# Patient Record
Sex: FEMALE | Race: WHITE | NOT HISPANIC OR LATINO | Employment: OTHER | URBAN - METROPOLITAN AREA
[De-identification: names, ages, dates, MRNs, and addresses within clinical notes are randomized per-mention and may not be internally consistent; named-entity substitution may affect disease eponyms.]

---

## 2018-04-07 LAB — CARCINOEMBRYONIC ANTIGEN (HISTORICAL): 2.7 NG/ML (ref 0–4.7)

## 2018-04-10 LAB
%SAT (HISTORICAL): 22 % (ref 20–55)
25(OH)D3 SERPL-MCNC: 31.51 NG/ML
ALBUMIN SERPL BCP-MCNC: 4.2 G/DL (ref 3.5–5.7)
ALP SERPL-CCNC: 74 IU/L (ref 55–165)
ALT SERPL W P-5'-P-CCNC: 14 IU/L (ref 5–26)
ANION GAP SERPL CALCULATED.3IONS-SCNC: 11.3 MM/L
AST SERPL W P-5'-P-CCNC: 23 U/L (ref 7–26)
BASOPHILS # BLD AUTO: 0.1 X3/UL (ref 0–0.3)
BASOPHILS # BLD AUTO: 1.1 % (ref 0–2)
BILIRUB SERPL-MCNC: 0.7 MG/DL (ref 0.3–1)
BILIRUBIN DIRECT (HISTORICAL): 0.1 MG/DL (ref 0–0.2)
BUN SERPL-MCNC: 12 MG/DL (ref 7–25)
CALCIUM SERPL-MCNC: 9.9 MG/DL (ref 8.6–10.5)
CHLORIDE SERPL-SCNC: 94 MM/L (ref 98–107)
CHOLEST SERPL-MCNC: 195 MG/DL (ref 0–200)
CO2 SERPL-SCNC: 35 MM/L (ref 21–31)
CREAT SERPL-MCNC: 0.71 MG/DL (ref 0.6–1.2)
DEPRECATED RDW RBC AUTO: 14.5 %
EGFR (HISTORICAL): > 60 GFR
EGFR AFRICAN AMERICAN (HISTORICAL): > 60 GFR
EOSINOPHIL # BLD AUTO: 0.2 X3/UL (ref 0–0.5)
EOSINOPHIL NFR BLD AUTO: 2.5 % (ref 0–5)
GLUCOSE (HISTORICAL): 94 MG/DL (ref 65–99)
HCT VFR BLD AUTO: 38.9 % (ref 37–47)
HDLC SERPL-MCNC: 72 MG/DL (ref 40–60)
HGB BLD-MCNC: 13.5 G/DL (ref 12–16)
IRON SERPL-MCNC: 64 UG/DL (ref 50–212)
LDLC SERPL CALC-MCNC: 98.6 MG/DL (ref 75–193)
LYMPHOCYTES # BLD AUTO: 1.9 X3/UL (ref 1.2–4.2)
LYMPHOCYTES NFR BLD AUTO: 26 % (ref 20.5–51.1)
MCH RBC QN AUTO: 30.4 PG (ref 26–34)
MCHC RBC AUTO-ENTMCNC: 34.8 G/DL (ref 31–37)
MCV RBC AUTO: 87.4 FL (ref 81–99)
MONOCYTES # BLD AUTO: 0.6 X3/UL (ref 0–1)
MONOCYTES NFR BLD AUTO: 8.8 % (ref 1.7–12)
NEUTROPHILS # BLD AUTO: 4.5 X3/UL (ref 1.4–6.5)
NEUTS SEG NFR BLD AUTO: 61.6 % (ref 42.2–75.2)
OSMOLALITY, SERUM (HISTORICAL): 273 MOSM (ref 262–291)
PLATELET # BLD AUTO: 260 X3/UL (ref 130–400)
PMV BLD AUTO: 7.7 FL
POTASSIUM SERPL-SCNC: 3.3 MM/L (ref 3.5–5.5)
RBC # BLD AUTO: 4.44 X6/UL (ref 3.9–5.2)
SODIUM SERPL-SCNC: 137 MM/L (ref 134–143)
TIBC SERPL-MCNC: 297 UG/DL (ref 250–425)
TOTAL PROTEIN (HISTORICAL): 6.9 G/DL (ref 6.4–8.9)
TRANSFERRIN (HISTORICAL): 212 MG/DL (ref 250–380)
TRIGL SERPL-MCNC: 124 MG/DL (ref 44–166)
VLDL CHOLESTEROL (HISTORICAL): 25 MG/DL (ref 5–51)
WBC # BLD AUTO: 7.2 X3/UL (ref 4.8–10.8)

## 2018-05-18 LAB — POTASSIUM SERPL-SCNC: 3 MM/L (ref 3.5–5.5)

## 2018-07-13 ENCOUNTER — TRANSCRIBE ORDERS (OUTPATIENT)
Dept: ADMINISTRATIVE | Facility: HOSPITAL | Age: 83
End: 2018-07-13

## 2018-07-13 ENCOUNTER — APPOINTMENT (OUTPATIENT)
Dept: LAB | Facility: HOSPITAL | Age: 83
End: 2018-07-13
Payer: MEDICARE

## 2018-07-13 DIAGNOSIS — E55.9 MILD VITAMIN D DEFICIENCY: ICD-10-CM

## 2018-07-13 DIAGNOSIS — E78.2 MIXED HYPERLIPIDEMIA: ICD-10-CM

## 2018-07-13 DIAGNOSIS — R53.83 FATIGUE, UNSPECIFIED TYPE: ICD-10-CM

## 2018-07-13 DIAGNOSIS — R53.83 FATIGUE, UNSPECIFIED TYPE: Primary | ICD-10-CM

## 2018-07-13 LAB
25(OH)D3 SERPL-MCNC: 26.5 NG/ML (ref 30–100)
ALBUMIN SERPL BCP-MCNC: 3.9 G/DL (ref 3.5–5.7)
ALP SERPL-CCNC: 86 U/L (ref 55–165)
ALT SERPL W P-5'-P-CCNC: 11 U/L (ref 7–52)
ANION GAP SERPL CALCULATED.3IONS-SCNC: 8 MMOL/L (ref 4–13)
AST SERPL W P-5'-P-CCNC: 20 U/L (ref 13–39)
BILIRUB DIRECT SERPL-MCNC: 0.1 MG/DL (ref 0–0.2)
BILIRUB SERPL-MCNC: 0.5 MG/DL (ref 0.2–1)
BUN SERPL-MCNC: 15 MG/DL (ref 7–25)
CALCIUM SERPL-MCNC: 9.5 MG/DL (ref 8.6–10.5)
CHLORIDE SERPL-SCNC: 96 MMOL/L (ref 98–107)
CHOLEST SERPL-MCNC: 172 MG/DL (ref 0–200)
CO2 SERPL-SCNC: 35 MMOL/L (ref 21–31)
CREAT SERPL-MCNC: 0.69 MG/DL (ref 0.6–1.2)
ERYTHROCYTE [DISTWIDTH] IN BLOOD BY AUTOMATED COUNT: 13.5 % (ref 11.6–15.1)
FERRITIN SERPL-MCNC: 81 NG/ML (ref 8–388)
GFR SERPL CREATININE-BSD FRML MDRD: 73 ML/MIN/1.73SQ M
GLUCOSE P FAST SERPL-MCNC: 98 MG/DL (ref 65–99)
HCT VFR BLD AUTO: 38 % (ref 37–47)
HDLC SERPL-MCNC: 71 MG/DL (ref 40–60)
HGB BLD-MCNC: 13.1 G/DL (ref 11.5–15.4)
IRON SATN MFR SERPL: 23 %
IRON SERPL-MCNC: 71 UG/DL (ref 50–170)
LDLC SERPL CALC-MCNC: 82 MG/DL (ref 0–100)
MCH RBC QN AUTO: 31.6 PG (ref 26.8–34.3)
MCHC RBC AUTO-ENTMCNC: 34.5 G/DL (ref 31.4–37.4)
MCV RBC AUTO: 92 FL (ref 82–98)
NONHDLC SERPL-MCNC: 101 MG/DL
PLATELET # BLD AUTO: 295 THOUSANDS/UL (ref 149–390)
PMV BLD AUTO: 7.9 FL (ref 8.9–12.7)
POTASSIUM SERPL-SCNC: 3.5 MMOL/L (ref 3.5–5.5)
PROT SERPL-MCNC: 6.6 G/DL (ref 6.4–8.9)
RBC # BLD AUTO: 4.16 MILLION/UL (ref 3.81–5.12)
SODIUM SERPL-SCNC: 139 MMOL/L (ref 134–143)
TIBC SERPL-MCNC: 307 UG/DL (ref 250–450)
TRIGL SERPL-MCNC: 96 MG/DL (ref 44–166)
WBC # BLD AUTO: 7.1 THOUSAND/UL (ref 4.31–10.16)

## 2018-07-13 PROCEDURE — 36415 COLL VENOUS BLD VENIPUNCTURE: CPT

## 2018-07-13 PROCEDURE — 82728 ASSAY OF FERRITIN: CPT

## 2018-07-13 PROCEDURE — 83550 IRON BINDING TEST: CPT

## 2018-07-13 PROCEDURE — 82306 VITAMIN D 25 HYDROXY: CPT

## 2018-07-13 PROCEDURE — 80061 LIPID PANEL: CPT

## 2018-07-13 PROCEDURE — 83540 ASSAY OF IRON: CPT

## 2018-07-13 PROCEDURE — 80076 HEPATIC FUNCTION PANEL: CPT

## 2018-07-13 PROCEDURE — 85027 COMPLETE CBC AUTOMATED: CPT

## 2018-07-13 PROCEDURE — 80048 BASIC METABOLIC PNL TOTAL CA: CPT

## 2018-08-13 ENCOUNTER — EVALUATION (OUTPATIENT)
Dept: PHYSICAL THERAPY | Facility: CLINIC | Age: 83
End: 2018-08-13
Payer: MEDICARE

## 2018-08-13 DIAGNOSIS — M17.12 OSTEOARTHRITIS OF LEFT KNEE, UNSPECIFIED OSTEOARTHRITIS TYPE: Primary | ICD-10-CM

## 2018-08-13 DIAGNOSIS — R29.898 MUSCULAR DECONDITIONING: ICD-10-CM

## 2018-08-13 PROCEDURE — G8979 MOBILITY GOAL STATUS: HCPCS | Performed by: PHYSICAL THERAPIST

## 2018-08-13 PROCEDURE — 97163 PT EVAL HIGH COMPLEX 45 MIN: CPT | Performed by: PHYSICAL THERAPIST

## 2018-08-13 PROCEDURE — G8978 MOBILITY CURRENT STATUS: HCPCS | Performed by: PHYSICAL THERAPIST

## 2018-08-13 PROCEDURE — 97110 THERAPEUTIC EXERCISES: CPT | Performed by: PHYSICAL THERAPIST

## 2018-08-13 NOTE — PROGRESS NOTES
PT Evaluation     Today's date: 2018  Patient name: Jacques Upton  : 3/29/1921  MRN: [id number]  Referring provider: Daniel Boyd DO  Dx:   Encounter Diagnosis     ICD-10-CM    1  Osteoarthritis of left knee, unspecified osteoarthritis type M17 12    2  Muscular deconditioning R29 898                   Assessment  Impairments: abnormal gait, activity intolerance, impaired balance, impaired physical strength, lacks appropriate home exercise program and pain with function  Functional limitations: difficulty ambulating long distances, ambulating up/down the steps, lifting/carrying a bag of groceries  Assessment details: Jacques Upton is a 80 y o  female with a history of anxiety, arthritis, back pain, HTN that presents with signs and symptoms consistent with left knee OA  During the examination the patient demonstrated decreased strength, decreased ROM, gait dysfunction, and pain  The patient's impairments are causing the following functional limitations: difficulty ambulating long distances, ambulating up/down the steps, lifting/carrying a bag of groceries  Patient's presentation is unstable due to chronicity of diagnosis, patient age, and history of anxiety  The patient will benefit from skilled PT services to address impairments, work towards goals, and restore PLOF  Barriers to therapy: [place], advanced age  Understanding of Dx/Px/POC: good   Prognosis: fair    Goals  STG: Achieve in 2-4 weeks  1  Patient will have reported 4/10 pain in left thigh when attempting to relax/rest    2  Improve patient's L SLR to 50 degrees for increased ability to take proper strides during ambulation  3  Increase patient's MMT left quadriceps, hamstrings, hip abduction to at least 4/5 to improve walking tolerance  LTG:  Achieve in 4-8 weeks  1  Patient's LE strength will be equal bilaterally for ability to ambulate and stair climb without difficulty     2  Patient will be able to walk 2 blocks with less than 2/10 pain in left leg for increased ability to grocery shop, walk for leisure  3  Patient will be independent with home exercise program for continued maintenance post PT discharge  Plan  Patient would benefit from: skilled physical therapy  Planned modality interventions: thermotherapy: hydrocollator packs and cryotherapy  Planned therapy interventions: manual therapy, massage, neuromuscular re-education, therapeutic activities, therapeutic exercise, home exercise program, gait training, stretching, strengthening and patient education  Frequency: 2x week  Duration in visits: 12  Duration in weeks: 6  Plan of Care beginning date: 2018  Plan of Care expiration date: 2018        Subjective Evaluation    History of Present Illness  Date of onset: 2015  Mechanism of injury: Van Alva is a 80 y o  female that presents to outpatient physical therapy with complaints of left lateral thigh, knee, and lateral calf pain  The patient reports having the shingles  as the mechanism of injury  This pain is chronic and intermittent over the past 3 years  The patient was referred to outpatient PT by Dr Nader Weber with a diagnosis of left knee OA and deconditioning  The patient's main goal for physical therapy is to be able to relax in the evening without left leg pain  The patient wants to be able to walk and be active       PmHx: Hx L leg DVT , HTN, hx colorectal surgery , L leg shingles, anxiety, [place]    Recurrent probem    Quality of life: good    Pain  Current pain ratin  At best pain ratin  At worst pain ratin  Location: left anterior/lateral thigh, left knee joint, lef lateral calf  Quality: sharp  Relieving factors: medications  Aggravating factors: standing and walking  Progression: no change    Social Support  Steps to enter house: yes  5  Stairs in house: no   [facility] in: [facility]  Lives with: adult children    Employment status: not working  Hand dominance: right    Treatments  No previous or current treatments  Patient Goals  Patient goals for therapy: independence with ADLs/IADLs, improved balance, decreased pain and increased strength          Objective     Palpation   Left   Tenderness of the rectus femoris and vastus lateralis  Tenderness   Left Knee   No tenderness in the lateral joint line and lateral patella  Neurological Testing     Sensation     Knee   Left Knee   Diminished: light touch     Right Knee   Diminished: light touch     Active Range of Motion     Right Knee   Flexion: 101 degrees   Extension: 0 degrees     Additional Active Range of Motion Details  SLR R: 50 degrees  L:  30 degrees (pain)  Hip Abduct R: 31 degrees  L: 30 degrees    Hip Adduct R:  30 degrees  L: 20 degrees    Strength/Myotome Testing     Left Hip   Planes of Motion   Flexion: 3+  Abduction: 3+  Adduction: 4    Right Hip   Planes of Motion   Flexion: 4  Abduction: 4  Adduction: 4    Left Knee   Flexion: 4-  Extension: 3+    Right Knee   Flexion: 4+  Extension: 4+    General Comments     Knee Comments  Patient displays decreased step length, slow gait speed, ambulates with a rolling walker at all times due to poor balance            Flowsheet Rows      Most Recent Value   PT/OT G-Codes   Current Score  45   Projected Score  50   FOTO information reviewed  Yes   Assessment Type  Evaluation   G code set  Mobility: Walking & Moving Around   Mobility: Walking and Moving Around Current Status ()  CK   Mobility: Walking and Moving Around Goal Status ()  CK          Precautions: [facility]      Specialty Daily Treatment Diary     Manual  8/13/18       L Supine hamstring(bent knee) 3x:30       L IT band stretch(gentle) 3x:30       L piriformis stretch    ------- *      STM L anterior/lateral thigh ----------                   Exercise Diary  8/13/19       Nu step S=5 L2 x5 mins               Heel slides flex  adduct x10 :05 ea       Sit Hip up/out  Up/in  *      Sit Ham stretch 4x :25       LAQ x10 :05       Clam shells L  *      Stand Alt step taps  *      Chair squats  *      Step ups fwd/lateral        Stand balance feet together EO                       EC  *                                                                                  Modalities 8/13/18       MHP L thigh ---------                         The patient was given a new home exercise plan with written handout, pictures, and verbal instruction  The patient accepts and understands the new home activities

## 2018-08-13 NOTE — LETTER
2018    Eusebia Young DO  [address]  [address]  [facility] [address]    Patient: Gina Olsen   YOB: 1921   Date of Visit: 2018     Encounter Diagnosis     ICD-10-CM    1  Osteoarthritis of left knee, unspecified osteoarthritis type M17 12    2  Muscular deconditioning R29 898        Dear Dr Freedom Singletary:    Please review the attached Plan of Care from [facility] recent visit  Please verify that you agree therapy should continue by signing the attached document and sending it back to our office  If you have any questions or concerns, please don't hesitate to call  Sincerely,    Sharlene Sampson, PT      Referring Provider:      I certify that I have read the below Plan of Care and certify the need for these services furnished under this plan of treatment while under my care  Eusebia Young DO  [address]  [address]: [phone]          PT Evaluation     Today's date: 2018  Patient name: Gina Olsen  : 3/29/1921  MRN: [id number]  Referring provider: Reina Lora DO  Dx:   Encounter Diagnosis     ICD-10-CM    1  Osteoarthritis of left knee, unspecified osteoarthritis type M17 12    2  Muscular deconditioning R29 898                   Assessment  Impairments: abnormal gait, activity intolerance, impaired balance, impaired physical strength, lacks appropriate home exercise program and pain with function  Functional limitations: difficulty ambulating long distances, ambulating up/down the steps, lifting/carrying a bag of groceries  Assessment details: Gina Olsen is a 80 y o  female with a history of anxiety, arthritis, back pain, HTN that presents with signs and symptoms consistent with left knee OA  During the examination the patient demonstrated decreased strength, decreased ROM, gait dysfunction, and pain    The patient's impairments are causing the following functional limitations: difficulty ambulating long distances, ambulating up/down the steps, lifting/carrying a bag of groceries  Patient's presentation is unstable due to chronicity of diagnosis, patient age, and history of anxiety  The patient will benefit from skilled PT services to address impairments, work towards goals, and restore PLOF  Barriers to therapy: [place], advanced age  Understanding of Dx/Px/POC: good   Prognosis: fair    Goals  STG: Achieve in 2-4 weeks  1  Patient will have reported 4/10 pain in left thigh when attempting to relax/rest    2  Improve patient's L SLR to 50 degrees for increased ability to take proper strides during ambulation  3  Increase patient's MMT left quadriceps, hamstrings, hip abduction to at least 4/5 to improve walking tolerance  LTG:  Achieve in 4-8 weeks  1  Patient's LE strength will be equal bilaterally for ability to ambulate and stair climb without difficulty  2  Patient will be able to walk 2 blocks with less than 2/10 pain in left leg for increased ability to grocery shop, walk for leisure  3  Patient will be independent with home exercise program for continued maintenance post PT discharge  Plan  Patient would benefit from: skilled physical therapy  Planned modality interventions: thermotherapy: hydrocollator packs and cryotherapy  Planned therapy interventions: manual therapy, massage, neuromuscular re-education, therapeutic activities, therapeutic exercise, home exercise program, gait training, stretching, strengthening and patient education  Frequency: 2x week  Duration in visits: 12  Duration in weeks: 6  Plan of Care beginning date: 8/13/2018  Plan of Care expiration date: 9/11/2018        Subjective Evaluation    History of Present Illness  Date of onset: 8/13/2015  Mechanism of injury: Ramez Dee is a 80 y o  female that presents to outpatient physical therapy with complaints of left lateral thigh, knee, and lateral calf pain    The patient reports having the shingles  as the mechanism of injury  This pain is chronic and intermittent over the past 3 years  The patient was referred to outpatient PT by Dr Tho Jurado with a diagnosis of left knee OA and deconditioning  The patient's main goal for physical therapy is to be able to relax in the evening without left leg pain  The patient wants to be able to walk and be active  PmHx: Hx L leg DVT 2013, HTN, hx colorectal surgery 2013, L leg shingles, anxiety, [place]    Recurrent probem    Quality of life: good    Pain  Current pain ratin  At best pain ratin  At worst pain ratin  Location: left anterior/lateral thigh, left knee joint, lef lateral calf  Quality: sharp  Relieving factors: medications  Aggravating factors: standing and walking  Progression: no change    Social Support  Steps to enter house: yes  5  Stairs in house: no   Lives in: [employer]  Lives with: adult children    Employment status: not working  Hand dominance: right    Treatments  No previous or current treatments  Patient Goals  Patient goals for therapy: independence with ADLs/IADLs, improved balance, decreased pain and increased strength          Objective     Palpation   Left   Tenderness of the rectus femoris and vastus lateralis  Tenderness   Left Knee   No tenderness in the lateral joint line and lateral patella       Neurological Testing     Sensation     Knee   Left Knee   Diminished: light touch     Right Knee   Diminished: light touch     Active Range of Motion     Right Knee   Flexion: 101 degrees   Extension: 0 degrees     Additional Active Range of Motion Details  SLR R: 50 degrees  L:  30 degrees (pain)  Hip Abduct R: 31 degrees  L: 30 degrees    Hip Adduct R:  30 degrees  L: 20 degrees    Strength/Myotome Testing     Left Hip   Planes of Motion   Flexion: 3+  Abduction: 3+  Adduction: 4    Right Hip   Planes of Motion   Flexion: 4  Abduction: 4  Adduction: 4    Left Knee   Flexion: 4-  Extension: 3+    Right Knee Flexion: 4+  Extension: 4+    General Comments     Knee Comments  Patient displays decreased step length, slow gait speed, ambulates with a rolling walker at all times due to poor balance  Flowsheet Rows      Most Recent Value   PT/OT G-Codes   Current Score  45   Projected Score  50   FOTO information reviewed  Yes   Assessment Type  Evaluation   G code set  Mobility: Walking & Moving Around   Mobility: Walking and Moving Around Current Status ()  CK   Mobility: Walking and Moving Around Goal Status ()  CK          Precautions: [facility]      Specialty Daily Treatment Diary     Manual  8/13/18       L Supine hamstring(bent knee) 3x:30       L IT band stretch(gentle) 3x:30       L piriformis stretch    ------- *      STM L anterior/lateral thigh ----------                   Exercise Diary  8/13/19       Nu step S=5 L2 x5 mins               Heel slides flex  adduct x10 :05 ea       Sit Hip up/out  Up/in  *      Sit Ham stretch 4x :25       LAQ x10 :05       Clam shells L  *      Stand Alt step taps  *      Chair squats  *      Step ups fwd/lateral        Stand balance feet together EO                       EC  *                                                                                  Modalities 8/13/18       MHP L thigh ---------                         The patient was given a new home exercise plan with written handout, pictures, and verbal instruction  The patient accepts and understands the new home activities

## 2018-08-15 ENCOUNTER — TRANSCRIBE ORDERS (OUTPATIENT)
Dept: PHYSICAL THERAPY | Facility: CLINIC | Age: 83
End: 2018-08-15

## 2018-08-16 ENCOUNTER — OFFICE VISIT (OUTPATIENT)
Dept: PHYSICAL THERAPY | Facility: CLINIC | Age: 83
End: 2018-08-16
Payer: MEDICARE

## 2018-08-16 DIAGNOSIS — R29.898 MUSCULAR DECONDITIONING: ICD-10-CM

## 2018-08-16 DIAGNOSIS — M17.12 OSTEOARTHRITIS OF LEFT KNEE, UNSPECIFIED OSTEOARTHRITIS TYPE: Primary | ICD-10-CM

## 2018-08-16 PROCEDURE — 97110 THERAPEUTIC EXERCISES: CPT

## 2018-08-16 NOTE — PROGRESS NOTES
Daily Note     Today's date: 2018  Patient name: Rajesh Ogden  : 3/29/1921  MRN: [id number]  Referring provider: Nishi Mendoza DO  Dx:   Encounter Diagnosis     ICD-10-CM    1  Osteoarthritis of left knee, unspecified osteoarthritis type M17 12    2  Muscular deconditioning R29 898                   Subjective: Patient reports no pain in her left leg today  She states her leg feels looser since last visit  She had no increased pain with new exercises  Objective: See treatment diary below  Precautions: [facility]   Reeval:9/10/18      Specialty Daily Treatment Diary     Manual  18      L Supine hamstring(bent knee) 3x:30 3x :30      L IT band stretch(gentle) 3x:30 3x :30      L piriformis stretch    ------- 3x :30      STM L anterior/lateral thigh ----------                   Exercise Diary  19      Nu step S=5 L2 x5 mins L2 x 6min              Heel slides flex  adduct x10 :05 ea :05 x 15 ea B/L      Sit Hip sweep  X 10 each      Sit Ham stretch 4x :25 4 x :25      LAQ x10 :05 X 10 :05      Clam shells L  x10 ea B/L      Stand Alt step taps  x10 each      Chair squats  x10 in bars      Step ups fwd/lateral   *     Stand balance feet together EO                       EC  EO (0H)/  EC (1H)  :30 x2 ea                                                                                 Patient and her daughter (present entire time per patient request) were instructed on updated HEP that included additional exercises  Hand out given and explained to patient and her family  Assessment: Tolerated treatment fair  Patient would benefit from continued PT improved balance and strength  Patient needed verbal, visual, and tactile cues to perform exercises correctly  Patient is forgetful and easily confused  She is pleasant and cooperative  Plan: Continue per plan of care  Progress treatment as tolerated

## 2018-08-20 ENCOUNTER — OFFICE VISIT (OUTPATIENT)
Dept: PHYSICAL THERAPY | Facility: CLINIC | Age: 83
End: 2018-08-20
Payer: MEDICARE

## 2018-08-20 DIAGNOSIS — M17.12 OSTEOARTHRITIS OF LEFT KNEE, UNSPECIFIED OSTEOARTHRITIS TYPE: Primary | ICD-10-CM

## 2018-08-20 DIAGNOSIS — R29.898 MUSCULAR DECONDITIONING: ICD-10-CM

## 2018-08-20 PROCEDURE — 97110 THERAPEUTIC EXERCISES: CPT | Performed by: PHYSICAL THERAPIST

## 2018-08-20 PROCEDURE — 97140 MANUAL THERAPY 1/> REGIONS: CPT | Performed by: PHYSICAL THERAPIST

## 2018-08-20 NOTE — PROGRESS NOTES
Daily Note     Today's date: 2018  Patient name: Van Alva  : 3/29/1921  MRN: [id number]  Referring provider: Liliana Wong DO  Dx:   Encounter Diagnosis     ICD-10-CM    1  Osteoarthritis of left knee, unspecified osteoarthritis type M17 12    2  Muscular deconditioning R29 898                   Subjective: The patient complains of left anterior knee pain 6/10 today  The patient is concerned some of her exercises are increasing her pain but she is unable to express which exercises  Objective: See treatment diary below  Precautions: [facility]   Reeval:9/10/18    Specialty Daily Treatment Diary     Manual  18     L Supine hamstring(bent knee) 3x:30 3x :30 4x:30     L IT band stretch(gentle) 3x:30 3x :30 4x:30     L piriformis stretch    ------- 3x :30 4x:30     Seated knee flexion stretch ----------       L calf stretch   3x:30         Exercise Diary  19     Nu step S=5 L2 x5 mins L2 x 6min L2 x 8 mins             Heel slides flex  adduct x10 :05 ea :05 x 15 ea B/L x10 ea     Sit Hip sweep  X 10 each x15     Sit Ham stretch 4x :25 4 x :25 Home ex     LAQ x10 :05 X 10 :05 #1 x15     Clam shells L  x10 ea B/L x20 L only     Stand Alt step taps  x10 each x15 no H     Chair squats  x10 in bars x15 mat no H     Step ups fwd/lateral   * 4" x10      Stand balance feet together EO                       EC  EO (0H)/  EC (1H)  :30 x2 ea :30 x 2 ea  No hand     SLR flex   x10     Bridges   x10                                                                 Assessment: The patient did well with therapy today  She reports a significant reduction in pain at her left knee after session but was not able to provide a number  The patient does need continuous verbal cues and supervision to perform her exercises properly  The patient will benefit from continued PT to achieve her goals  Plan: Continue per plan of care  Progress treatment as tolerated

## 2018-08-23 ENCOUNTER — OFFICE VISIT (OUTPATIENT)
Dept: PHYSICAL THERAPY | Facility: CLINIC | Age: 83
End: 2018-08-23
Payer: MEDICARE

## 2018-08-23 DIAGNOSIS — M17.12 OSTEOARTHRITIS OF LEFT KNEE, UNSPECIFIED OSTEOARTHRITIS TYPE: Primary | ICD-10-CM

## 2018-08-23 DIAGNOSIS — R29.898 MUSCULAR DECONDITIONING: ICD-10-CM

## 2018-08-23 PROCEDURE — 97140 MANUAL THERAPY 1/> REGIONS: CPT

## 2018-08-23 PROCEDURE — 97110 THERAPEUTIC EXERCISES: CPT

## 2018-08-23 NOTE — PROGRESS NOTES
Daily Note     Today's date: 2018  Patient name: Adelfo Corado  : 3/29/1921  MRN: [id number]  Referring provider: Cameron Gabriel DO  Dx:   Encounter Diagnosis     ICD-10-CM    1  Osteoarthritis of left knee, unspecified osteoarthritis type M17 12    2  Muscular deconditioning R29 898                   Subjective: Patient rates left knee pain 7-8/10 today  She states it is arthritis and "it will never go away"  Patient reports she does all her exercises on her papers  Also she is happy that her left thigh is "loosening" up  "That's what I wanted"  Objective: See treatment diary below  Precautions: [facility]   Reeval:9/10/18    Specialty Daily Treatment Diary     Manual  18    L Supine hamstring(bent knee) 3x:30 3x :30 4x:30 4 x :30    L IT band stretch(gentle) 3x:30 3x :30 4x:30 4 x:30    L piriformis stretch    ------- 3x :30 4x:30 4x :30    Seated knee flexion stretch ----------       L calf stretch   3x:30 3x :30        Exercise Diary  19    Nu step S=5 L2 x5 mins L2 x 6min L2 x 8 mins L2 x 9 min            Heel slides flex  adduct x10 :05 ea :05 x 15 ea B/L x10 ea :03 x 15 ea    Sit Hip sweep  X 10 each x15 B/L 2x10 ea    Sit Ham stretch 4x :25 4 x :25 Home ex -------    LAQ x10 :05 X 10 :05 #1 x15 1# 2x10    Clam shells L  x10 ea B/L x20 L only np    Stand Alt step taps  x10 each x15 no H X 15 (1-2 H)    Chair squats  x10 in bars x15 mat no H 2x10 mat (0H)    Step ups fwd/lateral    4" x10  4" forward x 12 B/L (2H)    Stand balance feet together EO                       EC  EO (0H)/  EC (1H)  :30 x2 ea :30 x 2 ea  No hand :30 x 2 ea (1H)    SLR flex   x10 x15    Bridges   x10 x15                                                                Assessment: Tolerated treatment fair  Patient demonstrated fatigue post treatment and would benefit from continued PTstrengthening, improved ROM, and balance activities   Patient forgetfully and has tendency to park her walker when near places to sit  Patient needed verbal, visual, and tactile cues throughout session  Patient seems to be liking her sessions of Physical Therapy  Plan: Continue per plan of care  Progress treatment as tolerated

## 2018-08-27 ENCOUNTER — OFFICE VISIT (OUTPATIENT)
Dept: PHYSICAL THERAPY | Facility: CLINIC | Age: 83
End: 2018-08-27
Payer: MEDICARE

## 2018-08-27 DIAGNOSIS — M17.12 OSTEOARTHRITIS OF LEFT KNEE, UNSPECIFIED OSTEOARTHRITIS TYPE: Primary | ICD-10-CM

## 2018-08-27 DIAGNOSIS — R29.898 MUSCULAR DECONDITIONING: ICD-10-CM

## 2018-08-27 PROCEDURE — 97110 THERAPEUTIC EXERCISES: CPT | Performed by: PHYSICAL THERAPIST

## 2018-08-27 PROCEDURE — 97140 MANUAL THERAPY 1/> REGIONS: CPT | Performed by: PHYSICAL THERAPIST

## 2018-08-27 NOTE — PROGRESS NOTES
Daily Note     Today's date: 2018  Patient name: Sveta Lewis  : 3/29/1921  MRN: [id number]  Referring provider: Jackie Coy DO  Dx:   Encounter Diagnosis     ICD-10-CM    1  Osteoarthritis of left knee, unspecified osteoarthritis type M17 12    2  Muscular deconditioning R29 898                   Subjective: The patient reports having 5-6/10 pain at her left posterior and anterior knee presently  The patient noted experiencing increased knee pain and difficulty walking over the weekend  The patient does not know a mechanism of injury for this pain increase        Objective: See treatment diary below  Precautions: [facility]   Reeval:9/10/18    Specialty Daily Treatment Diary     Manual  18   L Supine hamstring(bent knee) 3x:30 3x :30 4x:30 4 x :30 4x:30   L IT band stretch(gentle) 3x:30 3x :30 4x:30 4 x:30 4x:30   L piriformis stretch    ------- 3x :30 4x:30 4x :30 4x:30   Seated knee flexion stretch ----------    X 2 mins   L calf stretch   3x:30 3x :30 3x:30       Exercise Diary  19   Nu step S=5 L2 x5 mins L2 x 6min L2 x 8 mins L2 x 9 min L2 [id number]           Heel slides flex  adduct x10 :05 ea :05 x 15 ea B/L x10 ea :03 x 15 ea :03 x15   Sit Hip sweep  X 10 each x15 B/L 2x10 ea    Sit Ham stretch 4x :25 4 x :25 Home ex -------    LAQ x10 :05 X 10 :05 #1 x15 1# 2x10 1# 2x15   Clam shells L  x10 ea B/L x20 L only np ----------   Stand Alt step taps  x10 each x15 no H X 15 (1-2 H) x15 (1H)   Chair squats  x10 in bars x15 mat no H 2x10 mat (0H) 2x10   Step ups fwd/lateral    4" x10  4" forward x 12 B/L (2H) 4" x13 each (2H)   Stand balance feet together EO                       EC  EO (0H)/  EC (1H)  :30 x2 ea :30 x 2 ea  No hand :30 x 2 ea (1H) ---------   SLR flex   x10 x15 x15   Bridges   x10 x15 x15                                                   FOTO     done       Assessment: The patient tolerated all activities well today without experiencing an increase in left knee pain or any difficulty walking  The patient was advised to take longer rest breaks at home between exercises to avoid an increase in pain  The patient agreed that this may be the cause of her increased pain over the weekend  The patient's daughter was present for this instruction  The patient will benefit from continued to achieve goals set at initial evaluation  Plan: Continue per plan of care  Progress treatment as tolerated

## 2018-08-30 ENCOUNTER — OFFICE VISIT (OUTPATIENT)
Dept: PHYSICAL THERAPY | Facility: CLINIC | Age: 83
End: 2018-08-30
Payer: MEDICARE

## 2018-08-30 DIAGNOSIS — M17.12 OSTEOARTHRITIS OF LEFT KNEE, UNSPECIFIED OSTEOARTHRITIS TYPE: Primary | ICD-10-CM

## 2018-08-30 DIAGNOSIS — R29.898 MUSCULAR DECONDITIONING: ICD-10-CM

## 2018-08-30 PROCEDURE — 97140 MANUAL THERAPY 1/> REGIONS: CPT | Performed by: PHYSICAL THERAPIST

## 2018-08-30 PROCEDURE — 97110 THERAPEUTIC EXERCISES: CPT | Performed by: PHYSICAL THERAPIST

## 2018-08-30 NOTE — PROGRESS NOTES
Daily Note     Today's date: 2018  Patient name: Hany Latif  : 3/29/1921  MRN: [id number]  Referring provider: Mikey Frausto DO  Dx:   Encounter Diagnosis     ICD-10-CM    1  Osteoarthritis of left knee, unspecified osteoarthritis type M17 12    2  Muscular deconditioning R29 462                   Subjective: The patient denies having any pain today but does note having a feeling of instability at her left knee today  The patient feels this is from the arthritis at her left knee  The patient expressed being very pleased with her progress in therapy  Objective: See treatment diary below    Precautions: [facility]   Reeval:9/10/18    Specialty Daily Treatment Diary     Manual  18       L Supine hamstring(bent knee) 4x:30       L IT band stretch(gentle) 4x:30       L piriformis stretch 4x:30       Seated knee flexion stretch X 2 mins       L calf stretch 3x:30           Exercise Diary  18   Nu step S=5 L3 x10 mins    L2 [id number]           Heel slides flex  adduct -------    :03 x15   Sit Hip sweep        Sit Ham stretch        LAQ 2# x10    1# 2x15   Clam shells L     ----------   Stand Alt step taps x16 (1H)    x15 (1H)   Chair squats 2x10    2x10   Step ups fwd/lateral 4" x15 each    4" x13 each (2H)   Stand balance feet together EO                       EC Foam feet apart  2x:30 each    ---------   SLR flex x20    x15   Bridges x20    x15                                                   FOTO     done     Assessment: The patient tolerated all activities well today  The patient had some difficulty following multi-step commands due to her hearing deficit  The patient did not have pain after the session today  The patient will benefit from continued PT to achieve all goals  Plan: Continue per plan of care  Progress treatment as tolerated

## 2018-09-04 ENCOUNTER — OFFICE VISIT (OUTPATIENT)
Dept: PHYSICAL THERAPY | Facility: CLINIC | Age: 83
End: 2018-09-04
Payer: MEDICARE

## 2018-09-04 DIAGNOSIS — R29.898 MUSCULAR DECONDITIONING: ICD-10-CM

## 2018-09-04 DIAGNOSIS — M17.12 OSTEOARTHRITIS OF LEFT KNEE, UNSPECIFIED OSTEOARTHRITIS TYPE: Primary | ICD-10-CM

## 2018-09-04 PROCEDURE — 97110 THERAPEUTIC EXERCISES: CPT | Performed by: PHYSICAL THERAPIST

## 2018-09-04 PROCEDURE — 97140 MANUAL THERAPY 1/> REGIONS: CPT | Performed by: PHYSICAL THERAPIST

## 2018-09-04 NOTE — PROGRESS NOTES
Daily Note     Today's date: 2018  Patient name: Rajesh Ogden  : 3/29/1921  MRN: [id number]  Referring provider: Nishi Mendoza DO  Dx:   Encounter Diagnosis     ICD-10-CM    1  Osteoarthritis of left knee, unspecified osteoarthritis type M17 12    2  Muscular deconditioning R29 898                   Subjective: The patient reports 4-5/10 pain at her left knee presently  The patient was concerned she felt increased pain the night of her last therapy session  The patient felt good after the session but noted that night an increase in pain  The patient requests we stop doing the stretch on the edge of the mat for her left knee  The patient was educated that there is a low likelihood that the mentioned stretch was the cause due to the mild intensity and a lack of discomfort during/after therapy  The patient was agreeable to this but nonetheless still wants to hold off on receiving that stretch  Objective: See treatment diary below  Precautions: [facility]   Reeval:9/10/18    Specialty Daily Treatment Diary     Manual  18      L Supine hamstring(bent knee) 4x:30 4x:30      L IT band stretch(gentle) 4x:30 4x:30      L piriformis stretch 4x:30 4x:30      Seated knee flexion stretch X 2 mins Discharge      L calf stretch 3x:30 3x:30          Exercise Diary  18   Nu step S=5 L3 x10 mins L3 x 10 mins   L2 [id number]           Heel slides flex  adduct ------- :03 x 15   :03 x15   Sit Hip sweep  x15      Sit Ham stretch        LAQ 2# x10 2# 2x10    1# 2x15   Clam shells L  X 15   ----------   Stand Alt step taps x16 (1H) ----------   x15 (1H)   Chair squats 2x10 --------   2x10   Step ups fwd/lateral 4" x15 each -----------   4" x13 each (2H)   Stand balance feet together EO                       EC Foam feet apart  2x:30 each -------   ---------   SLR flex x20 x20   x15   Bridges x20 x20   x15                                                   FOTO     done       Assessment:   An extensive review of the patient's home exercise plan was done today with the patient's daughter present  The patient's plan was divided into morning and afternoon exercises and the frequency was lowered to 1x/day  The patient was given verbal cues to perform the exercises with correct form  The patient will benefit from 1 more session to review her home program and perform a re-assessment to determine the need for a plan adjustment  Plan: Progress note during next visit  Probable discharge from formal physical therapy at that time

## 2018-09-07 ENCOUNTER — EVALUATION (OUTPATIENT)
Dept: PHYSICAL THERAPY | Facility: CLINIC | Age: 83
End: 2018-09-07
Payer: MEDICARE

## 2018-09-07 DIAGNOSIS — M17.12 OSTEOARTHRITIS OF LEFT KNEE, UNSPECIFIED OSTEOARTHRITIS TYPE: Primary | ICD-10-CM

## 2018-09-07 DIAGNOSIS — R29.898 MUSCULAR DECONDITIONING: ICD-10-CM

## 2018-09-07 PROCEDURE — G8980 MOBILITY D/C STATUS: HCPCS | Performed by: PHYSICAL THERAPIST

## 2018-09-07 PROCEDURE — G8979 MOBILITY GOAL STATUS: HCPCS | Performed by: PHYSICAL THERAPIST

## 2018-09-07 PROCEDURE — 97140 MANUAL THERAPY 1/> REGIONS: CPT | Performed by: PHYSICAL THERAPIST

## 2018-09-07 PROCEDURE — 97110 THERAPEUTIC EXERCISES: CPT | Performed by: PHYSICAL THERAPIST

## 2018-09-07 NOTE — LETTER
2018    Yousif Taveras DO  [address]  [address]  [facility] [address]    Patient: Rober Armstrong   YOB: 1921   Date of Visit: 2018     Encounter Diagnosis     ICD-10-CM    1  Osteoarthritis of left knee, unspecified osteoarthritis type M17 12    2  Muscular deconditioning R29 898        Dear Dr Hess Cue:    Please review the attached Plan of Care from [facility] recent visit  Please verify that you agree therapy should continue by signing the attached document and sending it back to our office  If you have any questions or concerns, please don't hesitate to call  Sincerely,    Marva Matute, PT      Referring Provider:      I certify that I have read the below Plan of Care and certify the need for these services furnished under this plan of treatment while under my care  Alpeshhan TaverasDO  [address]  [address]: [phone]          PT Evaluation     Today's date: 2018  Patient name: Rober Armstrong  : 3/29/1921  MRN: [id number]  Referring provider: Alina Day DO  Dx:   Encounter Diagnosis     ICD-10-CM    1  Osteoarthritis of left knee, unspecified osteoarthritis type M17 12    2  Muscular deconditioning R29 898                   Assessment  Impairments: abnormal gait, activity intolerance, impaired balance, impaired physical strength, lacks appropriate home exercise program and pain with function  Functional limitations: difficulty ambulating long distances, ambulating up/down the steps, lifting/carrying a bag of groceries  Assessment details: Rober Armstrong is a 80 y o  female that has attended 8 sessions of physical therapy  She has made significant functional gain since starting therapy  The patient is able to walk longer distances for shopping without pin or problems  The patient is able to rest at night without left leg pain which was her personal goal for therapy    The patient is now independent with her home exercises and achieved the majority of her goals at this time  Will discharge outpatient PT at this time  Barriers to therapy: [place], advanced age  Understanding of Dx/Px/POC: good   Prognosis: fair    Goals  STG: Achieve in 2-4 weeks  1  Patient will have reported 4/10 pain in left thigh when attempting to relax/rest  MET  18  2  Improve patient's L SLR to 50 degrees for increased ability to take proper strides during ambulation  MET 18  3  Increase patient's MMT left quadriceps, hamstrings, hip abduction to at least 4/5 to improve walking tolerance  MET  LTG:  Achieve in 4-8 weeks  1  Patient's LE strength will be equal bilaterally for ability to ambulate and stair climb without difficulty  MET 18  2  Patient will be able to walk 2 blocks with less than 2/10 pain in left leg for increased ability to grocery shop, walk for leisure  MET 18  3  Patient will be independent with home exercise program for continued maintenance post PT discharge  MET 18        Subjective Evaluation    History of Present Illness  Date of onset: 2015  Mechanism of injury: Sveta Lewis is a 80 y o  female that presents to outpatient physical therapy with complaints of left lateral thigh, knee, and lateral calf pain  The patient reports having the shingles  as the mechanism of injury  This pain is chronic and intermittent over the past 3 years  The patient was referred to outpatient PT by Dr Lana Sherman with a diagnosis of left knee OA and deconditioning  The patient's main goal for physical therapy is to be able to relax in the evening without left leg pain  The patient wants to be able to walk and be active       PmHx: Hx L leg DVT , HTN, hx colorectal surgery 2013, L leg shingles, anxiety, [place]    Recurrent probem    Quality of life: good    Pain  Current pain ratin  At best pain ratin  At worst pain ratin  Location: left knee  Quality: sharp  Relieving factors: medications  Progression: no change    Social Support  Steps to enter house: yes  5  Stairs in house: no   Lives in: [place] house  Lives with: adult children    Employment status: not working  Hand dominance: right    Treatments  No previous or current treatments        Objective     Palpation   Left   No palpable tenderness to the rectus femoris and vastus lateralis  Tenderness of the vastus lateralis  Neurological Testing     Sensation     Knee   Left Knee   Diminished: light touch     Right Knee   Diminished: light touch     Active Range of Motion     Right Knee   Flexion: 101 degrees   Extension: 0 degrees     Additional Active Range of Motion Details  SLR R: 50 degrees  L:  52 degrees (pain) INCREASED 22 DEGREES 9/7/18  Hip Abduct R: 31 degrees  L: 30 degrees    Hip Adduct R:  30 degrees  L: 25 degrees INCREASED 5 DEGREES 9/7/18    Strength/Myotome Testing     Left Hip   Planes of Motion   Flexion: 4  Abduction: 4  Adduction: 4+    Right Hip   Planes of Motion   Flexion: 4  Abduction: 4  Adduction: 4    Left Knee   Flexion: 4  Extension: 4    Right Knee   Flexion: 4+  Extension: 4+    General Comments     Knee Comments  Patient's gait looks much improved with equal step lengths, improved gait speed, and no postural deviations          Flowsheet Rows      Most Recent Value   PT/OT G-Codes   Current Score  54   Projected Score  50   FOTO information reviewed  Yes   Assessment Type  Discharge   G code set  Mobility: Walking & Moving Around   Mobility: Walking and Moving Around Goal Status ()  CK   Mobility: Walking and Moving Around Discharge Status ()  CK          Precautions: [place]  Reeval:9/10/18    Specialty Daily Treatment Diary     Manual  8/30/18 9/4/18 9/7/18     L Supine hamstring(bent knee) 4x:30 4x:30 4x:30     L IT band stretch(gentle) 4x:30 4x:30 4x:30     L piriformis stretch 4x:30 4x:30 4x:30     Seated knee flexion stretch X 2 mins Discharge      L calf stretch 3x:30 3x:30 3x:30 Exercise Diary  8/30/18 9/4/18 9/7/18     Nu step S=5 L3 x10 mins L3 x 10 mins L3 x10 mins             Heel slides flex  adduct ------- :03 x 15 -----     Sit Hip sweep  x15 ----     Sit Ham stretch        LAQ 2# x10 2# 2x10  ----     Clam shells L  X 15 ---     Stand Alt step taps x16 (1H) ---------- -----     Chair squats 2x10 -------- ----     Step ups fwd/lateral 4" x15 each ----------- ---     Stand balance feet together EO                       EC Foam feet apart  2x:30 each ------- -----     SLR flex x20 x20 ------     Rebekah Alu x20 x20 -----             L Calf Stretch  3x:30 3x:30                                     FOTO   done

## 2018-09-07 NOTE — PROGRESS NOTES
PT Evaluation     Today's date: 2018  Patient name: Roshni De Leon  : 3/29/1921  MRN: [id number]  Referring provider: Ruiz Aaron DO  Dx:   Encounter Diagnosis     ICD-10-CM    1  Osteoarthritis of left knee, unspecified osteoarthritis type M17 12    2  Muscular deconditioning R29 898                   Assessment  Impairments: abnormal gait, activity intolerance, impaired balance, impaired physical strength, lacks appropriate home exercise program and pain with function  Functional limitations: difficulty ambulating long distances, ambulating up/down the steps, lifting/carrying a bag of groceries  Assessment details: Roshni De Leon is a 80 y o  female that has attended 8 sessions of physical therapy  She has made significant functional gain since starting therapy  The patient is able to walk longer distances for shopping without pin or problems  The patient is able to rest at night without left leg pain which was her personal goal for therapy  The patient is now independent with her home exercises and achieved the majority of her goals at this time  Will discharge outpatient PT at this time  Barriers to therapy: [place], advanced age  Understanding of Dx/Px/POC: good   Prognosis: fair    Goals  STG: Achieve in 2-4 weeks  1  Patient will have reported 4/10 pain in left thigh when attempting to relax/rest  MET  18  2  Improve patient's L SLR to 50 degrees for increased ability to take proper strides during ambulation  MET 18  3  Increase patient's MMT left quadriceps, hamstrings, hip abduction to at least 4/5 to improve walking tolerance  MET  LTG:  Achieve in 4-8 weeks  1  Patient's LE strength will be equal bilaterally for ability to ambulate and stair climb without difficulty  MET 18  2  Patient will be able to walk 2 blocks with less than 2/10 pain in left leg for increased ability to grocery shop, walk for leisure  MET 18  3   Patient will be independent with home exercise program for continued maintenance post PT discharge  MET 18        Subjective Evaluation    History of Present Illness  Date of onset: 2015  Mechanism of injury: Lucille Pacheco is a 80 y o  female that presents to outpatient physical therapy with complaints of left lateral thigh, knee, and lateral calf pain  The patient reports having the shingles  as the mechanism of injury  This pain is chronic and intermittent over the past 3 years  The patient was referred to outpatient PT by Dr Andrew Perez with a diagnosis of left knee OA and deconditioning  The patient's main goal for physical therapy is to be able to relax in the evening without left leg pain  The patient wants to be able to walk and be active  PmHx: Hx L leg DVT , HTN, hx colorectal surgery , L leg shingles, anxiety, [place]    Recurrent probem    Quality of life: good    Pain  Current pain ratin  At best pain ratin  At worst pain ratin  Location: left knee  Quality: sharp  Relieving factors: medications  Progression: no change    Social Support  Steps to enter house: yes  5  Stairs in house: no   Lives in: [facility]  Lives with: adult children    Employment status: not working  Hand dominance: right    Treatments  No previous or current treatments        Objective     Palpation   Left   No palpable tenderness to the rectus femoris and vastus lateralis  Tenderness of the vastus lateralis       Neurological Testing     Sensation     Knee   Left Knee   Diminished: light touch     Right Knee   Diminished: light touch     Active Range of Motion     Right Knee   Flexion: 101 degrees   Extension: 0 degrees     Additional Active Range of Motion Details  SLR R: 50 degrees  L:  52 degrees (pain) INCREASED 22 DEGREES 18  Hip Abduct R: 31 degrees  L: 30 degrees    Hip Adduct R:  30 degrees  L: 25 degrees INCREASED 5 DEGREES 18    Strength/Myotome Testing     Left Hip   Planes of Motion   Flexion: 4  Abduction: 4  Adduction: 4+    Right Hip   Planes of Motion   Flexion: 4  Abduction: 4  Adduction: 4    Left Knee   Flexion: 4  Extension: 4    Right Knee   Flexion: 4+  Extension: 4+    General Comments     Knee Comments  Patient's gait looks much improved with equal step lengths, improved gait speed, and no postural deviations          Flowsheet Rows      Most Recent Value   PT/OT G-Codes   Current Score  54   Projected Score  50   FOTO information reviewed  Yes   Assessment Type  Discharge   G code set  Mobility: Walking & Moving Around   Mobility: Walking and Moving Around Goal Status ()  CK   Mobility: Walking and Moving Around Discharge Status ()  CK          Precautions: [place]  Reeval:9/10/18    Specialty Daily Treatment Diary     Manual  8/30/18 9/4/18 9/7/18     L Supine hamstring(bent knee) 4x:30 4x:30 4x:30     L IT band stretch(gentle) 4x:30 4x:30 4x:30     L piriformis stretch 4x:30 4x:30 4x:30     Seated knee flexion stretch X 2 mins Discharge      L calf stretch 3x:30 3x:30 3x:30         Exercise Diary  8/30/18 9/4/18 9/7/18     Nu step S=5 L3 x10 mins L3 x 10 mins L3 x10 mins             Heel slides flex  adduct ------- :03 x 15 -----     Sit Hip sweep  x15 ----     Sit Ham stretch        LAQ 2# x10 2# 2x10  ----     Clam shells L  X 15 ---     Stand Alt step taps x16 (1H) ---------- -----     Chair squats 2x10 -------- ----     Step ups fwd/lateral 4" x15 each ----------- ---     Stand balance feet together EO                       EC Foam feet apart  2x:30 each ------- -----     SLR flex x20 x20 ------     Bridges x20 x20 -----             L Calf Stretch  3x:30 3x:30                                     FOTO   done

## 2018-09-21 ENCOUNTER — TRANSCRIBE ORDERS (OUTPATIENT)
Dept: PHYSICAL THERAPY | Facility: CLINIC | Age: 83
End: 2018-09-21

## 2018-11-14 ENCOUNTER — APPOINTMENT (OUTPATIENT)
Dept: LAB | Facility: HOSPITAL | Age: 83
End: 2018-11-14
Payer: MEDICARE

## 2018-11-14 ENCOUNTER — TRANSCRIBE ORDERS (OUTPATIENT)
Dept: ADMINISTRATIVE | Facility: HOSPITAL | Age: 83
End: 2018-11-14

## 2018-11-14 DIAGNOSIS — E55.9 VITAMIN D DEFICIENCY: ICD-10-CM

## 2018-11-14 DIAGNOSIS — Z85.038 PERSONAL HISTORY OF COLON CANCER: Primary | ICD-10-CM

## 2018-11-14 DIAGNOSIS — D51.9 ANEMIA DUE TO VITAMIN B12 DEFICIENCY, UNSPECIFIED B12 DEFICIENCY TYPE: ICD-10-CM

## 2018-11-14 DIAGNOSIS — Z85.038 PERSONAL HISTORY OF COLON CANCER: ICD-10-CM

## 2018-11-14 DIAGNOSIS — E78.2 MIXED HYPERLIPIDEMIA: ICD-10-CM

## 2018-11-14 DIAGNOSIS — R53.83 FATIGUE, UNSPECIFIED TYPE: Primary | ICD-10-CM

## 2018-11-14 DIAGNOSIS — R53.83 FATIGUE, UNSPECIFIED TYPE: ICD-10-CM

## 2018-11-14 LAB
25(OH)D3 SERPL-MCNC: 38.3 NG/ML (ref 30–100)
ALBUMIN SERPL BCP-MCNC: 4 G/DL (ref 3.5–5.7)
ALP SERPL-CCNC: 84 U/L (ref 55–165)
ALT SERPL W P-5'-P-CCNC: 12 U/L (ref 7–52)
ANION GAP SERPL CALCULATED.3IONS-SCNC: 9 MMOL/L (ref 4–13)
AST SERPL W P-5'-P-CCNC: 22 U/L (ref 13–39)
BILIRUB DIRECT SERPL-MCNC: 0.2 MG/DL (ref 0–0.2)
BILIRUB SERPL-MCNC: 0.6 MG/DL (ref 0.2–1)
BUN SERPL-MCNC: 18 MG/DL (ref 7–25)
CALCIUM SERPL-MCNC: 9.4 MG/DL (ref 8.6–10.5)
CEA SERPL-MCNC: 0.8 NG/ML (ref 0–3)
CHLORIDE SERPL-SCNC: 95 MMOL/L (ref 98–107)
CHOLEST SERPL-MCNC: 195 MG/DL (ref 0–200)
CO2 SERPL-SCNC: 32 MMOL/L (ref 21–31)
CREAT SERPL-MCNC: 0.83 MG/DL (ref 0.6–1.2)
ERYTHROCYTE [DISTWIDTH] IN BLOOD BY AUTOMATED COUNT: 14.1 % (ref 11.6–15.1)
FERRITIN SERPL-MCNC: 167 NG/ML (ref 8–388)
GFR SERPL CREATININE-BSD FRML MDRD: 59 ML/MIN/1.73SQ M
GLUCOSE P FAST SERPL-MCNC: 89 MG/DL (ref 65–99)
HCT VFR BLD AUTO: 38.4 % (ref 37–47)
HDLC SERPL-MCNC: 81 MG/DL (ref 40–60)
HGB BLD-MCNC: 12.9 G/DL (ref 11.5–15.4)
IRON SATN MFR SERPL: 31 %
IRON SERPL-MCNC: 79 UG/DL (ref 50–170)
LDLC SERPL CALC-MCNC: 93 MG/DL (ref 0–100)
MCH RBC QN AUTO: 30.6 PG (ref 26.8–34.3)
MCHC RBC AUTO-ENTMCNC: 33.5 G/DL (ref 31.4–37.4)
MCV RBC AUTO: 92 FL (ref 82–98)
NONHDLC SERPL-MCNC: 114 MG/DL
PLATELET # BLD AUTO: 248 THOUSANDS/UL (ref 149–390)
PMV BLD AUTO: 7.6 FL (ref 8.9–12.7)
POTASSIUM SERPL-SCNC: 3.7 MMOL/L (ref 3.5–5.5)
PROT SERPL-MCNC: 6.8 G/DL (ref 6.4–8.9)
RBC # BLD AUTO: 4.2 MILLION/UL (ref 3.81–5.12)
SODIUM SERPL-SCNC: 136 MMOL/L (ref 134–143)
TIBC SERPL-MCNC: 254 UG/DL (ref 250–450)
TRIGL SERPL-MCNC: 106 MG/DL (ref 44–166)
VIT B12 SERPL-MCNC: 434 PG/ML (ref 100–900)
WBC # BLD AUTO: 7.4 THOUSAND/UL (ref 4.31–10.16)

## 2018-11-14 PROCEDURE — 82306 VITAMIN D 25 HYDROXY: CPT

## 2018-11-14 PROCEDURE — 82728 ASSAY OF FERRITIN: CPT

## 2018-11-14 PROCEDURE — 82607 VITAMIN B-12: CPT

## 2018-11-14 PROCEDURE — 80076 HEPATIC FUNCTION PANEL: CPT

## 2018-11-14 PROCEDURE — 83550 IRON BINDING TEST: CPT

## 2018-11-14 PROCEDURE — 82378 CARCINOEMBRYONIC ANTIGEN: CPT

## 2018-11-14 PROCEDURE — 80061 LIPID PANEL: CPT

## 2018-11-14 PROCEDURE — 83540 ASSAY OF IRON: CPT

## 2018-11-14 PROCEDURE — 85027 COMPLETE CBC AUTOMATED: CPT

## 2018-11-14 PROCEDURE — 80048 BASIC METABOLIC PNL TOTAL CA: CPT

## 2018-11-14 PROCEDURE — 36415 COLL VENOUS BLD VENIPUNCTURE: CPT

## 2019-03-13 ENCOUNTER — APPOINTMENT (OUTPATIENT)
Dept: LAB | Facility: HOSPITAL | Age: 84
End: 2019-03-13
Payer: MEDICARE

## 2019-03-13 ENCOUNTER — TRANSCRIBE ORDERS (OUTPATIENT)
Dept: ADMINISTRATIVE | Facility: HOSPITAL | Age: 84
End: 2019-03-13

## 2019-03-13 DIAGNOSIS — R53.83 FATIGUE, UNSPECIFIED TYPE: Primary | ICD-10-CM

## 2019-03-13 DIAGNOSIS — E78.2 MIXED HYPERLIPIDEMIA: ICD-10-CM

## 2019-03-13 DIAGNOSIS — R53.83 FATIGUE, UNSPECIFIED TYPE: ICD-10-CM

## 2019-03-13 DIAGNOSIS — E55.9 VITAMIN D DEFICIENCY: ICD-10-CM

## 2019-03-13 LAB
25(OH)D3 SERPL-MCNC: 38.4 NG/ML (ref 30–100)
ALBUMIN SERPL BCP-MCNC: 3.8 G/DL (ref 3.5–5.7)
ALP SERPL-CCNC: 59 U/L (ref 55–165)
ALT SERPL W P-5'-P-CCNC: 12 U/L (ref 7–52)
ANION GAP SERPL CALCULATED.3IONS-SCNC: 9 MMOL/L (ref 4–13)
AST SERPL W P-5'-P-CCNC: 17 U/L (ref 13–39)
BILIRUB DIRECT SERPL-MCNC: 0.2 MG/DL (ref 0–0.2)
BILIRUB SERPL-MCNC: 0.8 MG/DL (ref 0.2–1)
BUN SERPL-MCNC: 18 MG/DL (ref 7–25)
CALCIUM SERPL-MCNC: 9.4 MG/DL (ref 8.6–10.5)
CHLORIDE SERPL-SCNC: 95 MMOL/L (ref 98–107)
CHOLEST SERPL-MCNC: 189 MG/DL (ref 0–200)
CO2 SERPL-SCNC: 34 MMOL/L (ref 21–31)
CREAT SERPL-MCNC: 0.7 MG/DL (ref 0.6–1.2)
ERYTHROCYTE [DISTWIDTH] IN BLOOD BY AUTOMATED COUNT: 13.4 % (ref 11.6–15.1)
FERRITIN SERPL-MCNC: 150 NG/ML (ref 8–388)
GFR SERPL CREATININE-BSD FRML MDRD: 73 ML/MIN/1.73SQ M
GLUCOSE P FAST SERPL-MCNC: 104 MG/DL (ref 65–99)
HCT VFR BLD AUTO: 39.6 % (ref 37–47)
HDLC SERPL-MCNC: 73 MG/DL (ref 40–60)
HGB BLD-MCNC: 13.6 G/DL (ref 11.5–15.4)
IRON SATN MFR SERPL: 37 %
IRON SERPL-MCNC: 87 UG/DL (ref 50–170)
LDLC SERPL CALC-MCNC: 88 MG/DL (ref 0–100)
MCH RBC QN AUTO: 31 PG (ref 26.8–34.3)
MCHC RBC AUTO-ENTMCNC: 34.2 G/DL (ref 31.4–37.4)
MCV RBC AUTO: 91 FL (ref 82–98)
NONHDLC SERPL-MCNC: 116 MG/DL
PLATELET # BLD AUTO: 228 THOUSANDS/UL (ref 149–390)
PMV BLD AUTO: 7.9 FL (ref 8.9–12.7)
POTASSIUM SERPL-SCNC: 3 MMOL/L (ref 3.5–5.5)
PROT SERPL-MCNC: 6.5 G/DL (ref 6.4–8.9)
RBC # BLD AUTO: 4.38 MILLION/UL (ref 3.81–5.12)
SODIUM SERPL-SCNC: 138 MMOL/L (ref 134–143)
T3FREE SERPL-MCNC: 2.4 PG/ML (ref 2.3–4.2)
TIBC SERPL-MCNC: 237 UG/DL (ref 250–450)
TRIGL SERPL-MCNC: 142 MG/DL (ref 44–166)
TSH SERPL DL<=0.05 MIU/L-ACNC: 1.68 UIU/ML (ref 0.45–5.33)
WBC # BLD AUTO: 8.2 THOUSAND/UL (ref 4.31–10.16)

## 2019-03-13 PROCEDURE — 83550 IRON BINDING TEST: CPT

## 2019-03-13 PROCEDURE — 80076 HEPATIC FUNCTION PANEL: CPT

## 2019-03-13 PROCEDURE — 80061 LIPID PANEL: CPT

## 2019-03-13 PROCEDURE — 84481 FREE ASSAY (FT-3): CPT

## 2019-03-13 PROCEDURE — 82728 ASSAY OF FERRITIN: CPT

## 2019-03-13 PROCEDURE — 82306 VITAMIN D 25 HYDROXY: CPT

## 2019-03-13 PROCEDURE — 80048 BASIC METABOLIC PNL TOTAL CA: CPT

## 2019-03-13 PROCEDURE — 84443 ASSAY THYROID STIM HORMONE: CPT

## 2019-03-13 PROCEDURE — 36415 COLL VENOUS BLD VENIPUNCTURE: CPT

## 2019-03-13 PROCEDURE — 85027 COMPLETE CBC AUTOMATED: CPT

## 2019-03-13 PROCEDURE — 83540 ASSAY OF IRON: CPT

## 2019-03-25 ENCOUNTER — APPOINTMENT (OUTPATIENT)
Dept: LAB | Facility: HOSPITAL | Age: 84
End: 2019-03-25
Payer: MEDICARE

## 2019-03-25 ENCOUNTER — TRANSCRIBE ORDERS (OUTPATIENT)
Dept: ADMINISTRATIVE | Facility: HOSPITAL | Age: 84
End: 2019-03-25

## 2019-03-25 DIAGNOSIS — E87.6 HYPOPOTASSEMIA: ICD-10-CM

## 2019-03-25 DIAGNOSIS — E87.6 HYPOPOTASSEMIA: Primary | ICD-10-CM

## 2019-03-25 LAB — POTASSIUM SERPL-SCNC: 3.4 MMOL/L (ref 3.5–5.5)

## 2019-03-25 PROCEDURE — 36415 COLL VENOUS BLD VENIPUNCTURE: CPT

## 2019-03-25 PROCEDURE — 84132 ASSAY OF SERUM POTASSIUM: CPT

## 2019-04-29 ENCOUNTER — APPOINTMENT (OUTPATIENT)
Dept: LAB | Facility: HOSPITAL | Age: 84
End: 2019-04-29
Payer: MEDICARE

## 2019-04-29 ENCOUNTER — TRANSCRIBE ORDERS (OUTPATIENT)
Dept: ADMINISTRATIVE | Facility: HOSPITAL | Age: 84
End: 2019-04-29

## 2019-04-29 DIAGNOSIS — Z85.038 PERSONAL HISTORY OF COLON CANCER: Primary | ICD-10-CM

## 2019-04-29 DIAGNOSIS — Z85.038 PERSONAL HISTORY OF COLON CANCER: ICD-10-CM

## 2019-04-29 LAB — CEA SERPL-MCNC: 0.8 NG/ML (ref 0–3)

## 2019-04-29 PROCEDURE — 36415 COLL VENOUS BLD VENIPUNCTURE: CPT

## 2019-04-29 PROCEDURE — 82378 CARCINOEMBRYONIC ANTIGEN: CPT

## 2019-07-18 ENCOUNTER — TRANSCRIBE ORDERS (OUTPATIENT)
Dept: ADMINISTRATIVE | Facility: HOSPITAL | Age: 84
End: 2019-07-18

## 2019-07-18 ENCOUNTER — APPOINTMENT (OUTPATIENT)
Dept: LAB | Facility: HOSPITAL | Age: 84
End: 2019-07-18
Attending: INTERNAL MEDICINE
Payer: MEDICARE

## 2019-07-18 DIAGNOSIS — E78.2 MIXED HYPERLIPIDEMIA: ICD-10-CM

## 2019-07-18 DIAGNOSIS — E55.9 VITAMIN D DEFICIENCY: ICD-10-CM

## 2019-07-18 DIAGNOSIS — R53.83 FATIGUE, UNSPECIFIED TYPE: ICD-10-CM

## 2019-07-18 DIAGNOSIS — R53.83 FATIGUE, UNSPECIFIED TYPE: Primary | ICD-10-CM

## 2019-07-18 LAB
25(OH)D3 SERPL-MCNC: 41.1 NG/ML (ref 30–100)
ALBUMIN SERPL BCP-MCNC: 3.7 G/DL (ref 3.5–5)
ALP SERPL-CCNC: 95 U/L (ref 46–116)
ALT SERPL W P-5'-P-CCNC: 21 U/L (ref 12–78)
ANION GAP SERPL CALCULATED.3IONS-SCNC: 3 MMOL/L (ref 4–13)
AST SERPL W P-5'-P-CCNC: 22 U/L (ref 5–45)
BILIRUB DIRECT SERPL-MCNC: 0.18 MG/DL (ref 0–0.2)
BILIRUB SERPL-MCNC: 0.55 MG/DL (ref 0.2–1)
BUN SERPL-MCNC: 16 MG/DL (ref 5–25)
CALCIUM SERPL-MCNC: 9.3 MG/DL (ref 8.3–10.1)
CHLORIDE SERPL-SCNC: 101 MMOL/L (ref 100–108)
CHOLEST SERPL-MCNC: 172 MG/DL (ref 50–200)
CO2 SERPL-SCNC: 32 MMOL/L (ref 21–32)
CREAT SERPL-MCNC: 0.76 MG/DL (ref 0.6–1.3)
ERYTHROCYTE [DISTWIDTH] IN BLOOD BY AUTOMATED COUNT: 13.1 % (ref 11.6–15.1)
FERRITIN SERPL-MCNC: 107 NG/ML (ref 8–388)
GFR SERPL CREATININE-BSD FRML MDRD: 65 ML/MIN/1.73SQ M
GLUCOSE P FAST SERPL-MCNC: 103 MG/DL (ref 65–99)
HCT VFR BLD AUTO: 40.3 % (ref 34.8–46.1)
HDLC SERPL-MCNC: 76 MG/DL (ref 40–60)
HGB BLD-MCNC: 13.2 G/DL (ref 11.5–15.4)
IRON SATN MFR SERPL: 26 %
IRON SERPL-MCNC: 68 UG/DL (ref 50–170)
LDLC SERPL CALC-MCNC: 73 MG/DL (ref 0–100)
MCH RBC QN AUTO: 30.3 PG (ref 26.8–34.3)
MCHC RBC AUTO-ENTMCNC: 32.8 G/DL (ref 31.4–37.4)
MCV RBC AUTO: 92 FL (ref 82–98)
NONHDLC SERPL-MCNC: 96 MG/DL
PLATELET # BLD AUTO: 256 THOUSANDS/UL (ref 149–390)
PMV BLD AUTO: 10.7 FL (ref 8.9–12.7)
POTASSIUM SERPL-SCNC: 4 MMOL/L (ref 3.5–5.3)
PROT SERPL-MCNC: 6.9 G/DL (ref 6.4–8.2)
RBC # BLD AUTO: 4.36 MILLION/UL (ref 3.81–5.12)
SODIUM SERPL-SCNC: 136 MMOL/L (ref 136–145)
T3FREE SERPL-MCNC: 2.66 PG/ML (ref 2.3–4.2)
TIBC SERPL-MCNC: 264 UG/DL (ref 250–450)
TRIGL SERPL-MCNC: 117 MG/DL
TSH SERPL DL<=0.05 MIU/L-ACNC: 1.69 UIU/ML (ref 0.36–3.74)
WBC # BLD AUTO: 6.92 THOUSAND/UL (ref 4.31–10.16)

## 2019-07-18 PROCEDURE — 82306 VITAMIN D 25 HYDROXY: CPT

## 2019-07-18 PROCEDURE — 80061 LIPID PANEL: CPT

## 2019-07-18 PROCEDURE — 82728 ASSAY OF FERRITIN: CPT

## 2019-07-18 PROCEDURE — 36415 COLL VENOUS BLD VENIPUNCTURE: CPT

## 2019-07-18 PROCEDURE — 83550 IRON BINDING TEST: CPT

## 2019-07-18 PROCEDURE — 84481 FREE ASSAY (FT-3): CPT

## 2019-07-18 PROCEDURE — 80076 HEPATIC FUNCTION PANEL: CPT

## 2019-07-18 PROCEDURE — 80048 BASIC METABOLIC PNL TOTAL CA: CPT

## 2019-07-18 PROCEDURE — 85027 COMPLETE CBC AUTOMATED: CPT

## 2019-07-18 PROCEDURE — 84443 ASSAY THYROID STIM HORMONE: CPT

## 2019-07-18 PROCEDURE — 83540 ASSAY OF IRON: CPT

## 2019-09-05 DIAGNOSIS — G89.29 OTHER CHRONIC PAIN: Primary | ICD-10-CM

## 2019-09-05 NOTE — TELEPHONE ENCOUNTER
Patient's daughter called she needs a refill on Oxycodone/Acetaminophen 5/325mg take 1 tab q 6 hrs  Please call patient when ready

## 2019-09-10 RX ORDER — OXYCODONE HYDROCHLORIDE AND ACETAMINOPHEN 5; 325 MG/1; MG/1
1 TABLET ORAL EVERY 6 HOURS PRN
Qty: 120 TABLET | Refills: 0 | Status: SHIPPED | OUTPATIENT
Start: 2019-09-10 | End: 2019-09-11 | Stop reason: SDUPTHER

## 2019-09-10 NOTE — TELEPHONE ENCOUNTER
Patients daughter stopped in wanting to know an update regarding the Oxycodone for her mother  I did relay that it has not been completed yet  If this medication can be sent electronically to [address] or if she needs to come back to sign for the prescription  Please advise

## 2019-09-11 ENCOUNTER — TELEPHONE (OUTPATIENT)
Dept: NEPHROLOGY | Facility: CLINIC | Age: 84
End: 2019-09-11

## 2019-09-11 DIAGNOSIS — G89.29 OTHER CHRONIC PAIN: ICD-10-CM

## 2019-09-11 RX ORDER — OXYCODONE HYDROCHLORIDE AND ACETAMINOPHEN 5; 325 MG/1; MG/1
1 TABLET ORAL EVERY 6 HOURS PRN
Qty: 120 TABLET | Refills: 0 | Status: SHIPPED | OUTPATIENT
Start: 2019-09-11 | End: 2019-10-11

## 2019-09-25 DIAGNOSIS — E87.6 DIURETIC-INDUCED HYPOKALEMIA: Primary | ICD-10-CM

## 2019-09-25 DIAGNOSIS — T50.2X5A DIURETIC-INDUCED HYPOKALEMIA: Primary | ICD-10-CM

## 2019-09-25 RX ORDER — POTASSIUM CHLORIDE 20 MEQ/1
20 TABLET, EXTENDED RELEASE ORAL DAILY
COMMUNITY
End: 2019-09-25 | Stop reason: SDUPTHER

## 2019-09-26 RX ORDER — POTASSIUM CHLORIDE 20 MEQ/1
20 TABLET, EXTENDED RELEASE ORAL DAILY
Qty: 30 TABLET | Refills: 5 | Status: SHIPPED | OUTPATIENT
Start: 2019-09-26 | End: 2020-04-03

## 2019-10-14 ENCOUNTER — OFFICE VISIT (OUTPATIENT)
Dept: URGENT CARE | Facility: CLINIC | Age: 84
End: 2019-10-14
Payer: MEDICARE

## 2019-10-14 VITALS
DIASTOLIC BLOOD PRESSURE: 74 MMHG | RESPIRATION RATE: 16 BRPM | TEMPERATURE: 97.7 F | SYSTOLIC BLOOD PRESSURE: 127 MMHG | OXYGEN SATURATION: 98 % | HEART RATE: 75 BPM

## 2019-10-14 DIAGNOSIS — J01.40 ACUTE PANSINUSITIS, RECURRENCE NOT SPECIFIED: Primary | ICD-10-CM

## 2019-10-14 DIAGNOSIS — H92.03 EAR PAIN, BILATERAL: ICD-10-CM

## 2019-10-14 PROCEDURE — G0463 HOSPITAL OUTPT CLINIC VISIT: HCPCS | Performed by: NURSE PRACTITIONER

## 2019-10-14 PROCEDURE — 99214 OFFICE O/P EST MOD 30 MIN: CPT | Performed by: NURSE PRACTITIONER

## 2019-10-14 RX ORDER — AMOXICILLIN 875 MG/1
875 TABLET, COATED ORAL 2 TIMES DAILY
Qty: 20 TABLET | Refills: 0 | Status: SHIPPED | OUTPATIENT
Start: 2019-10-14 | End: 2019-10-24

## 2019-10-14 RX ORDER — VERAPAMIL HYDROCHLORIDE 180 MG/1
CAPSULE, EXTENDED RELEASE ORAL
COMMUNITY
End: 2019-11-04 | Stop reason: SDUPTHER

## 2019-10-14 RX ORDER — PRAVASTATIN SODIUM 80 MG/1
TABLET ORAL
COMMUNITY
End: 2019-11-04 | Stop reason: SDUPTHER

## 2019-10-14 RX ORDER — FLUTICASONE PROPIONATE 50 MCG
2 SPRAY, SUSPENSION (ML) NASAL DAILY
Qty: 1 BOTTLE | Refills: 0 | Status: SHIPPED | OUTPATIENT
Start: 2019-10-14 | End: 2020-12-21 | Stop reason: SDUPTHER

## 2019-10-14 RX ORDER — GABAPENTIN 800 MG/1
TABLET ORAL
Status: ON HOLD | COMMUNITY
End: 2022-01-01

## 2019-10-14 RX ORDER — HYDROCHLOROTHIAZIDE 12.5 MG/1
CAPSULE, GELATIN COATED ORAL
COMMUNITY
End: 2019-11-04 | Stop reason: SDUPTHER

## 2019-10-14 NOTE — PATIENT INSTRUCTIONS
Take amoxicillin as ordered until completed  Use the flonase spray (steroid nasal spray) as discussed  You may continue using the saline rinses and the allegra  Sinusitis, Ambulatory Care   GENERAL INFORMATION:   Sinusitis  is inflammation or infection of your sinuses  It is most often caused by a virus  Acute sinusitis may last up to 12 weeks  Chronic sinusitis lasts longer than 12 weeks  Recurrent sinusitis is when you have 3 or more episodes of sinusitis in 1 year  Common symptoms include the following:   · Fever    · Pain, pressure, redness, or swelling around the forehead, cheeks, or eyes    · Thick yellow or green discharge from your nose    · Tenderness when you touch your face over your sinuses    · Dry cough that happens mostly at night or when you lie down    · Headache and face pain that is worse when you lean forward    · Teeth pain or pain when you chew  Seek immediate care for the following symptoms:   · Vision changes such as double vision    · Confusion or trouble thinking clearly    · Headache and stiff neck    · Trouble breathing  Treatment for sinusitis  may include medicines to relieve nasal and sinus congestion or to decrease pain and fever  Ask your healthcare provider which medicines you should take and how much is safe  Manage sinusitis:   · Drink liquids as directed  Ask your healthcare provider how much liquid to drink each day and which liquids are best for you  Liquids will help loosen and drain the mucus in your sinuses  · Breathe in steam   Heat a bowl of water until you see steam  Lean over the bowl and make a tent over your head with a large towel  Breathe deeply for about 20 minutes  Be careful not to get too close to the steam or burn yourself  Do this 3 times a day  You can also breathe deeply when you take a hot shower  · Rinse your sinuses  Use a sinus rinse device to rinse your nasal passages with a saline (salt water) solution   This will help thin the mucus in your nose and rinse away pollen and dirt  It will also help reduce swelling so you can breathe normally  Ask how often to do this  · Use heat on your sinuses  to decrease pain  Apply heat for 15 to 20 minutes every hour for as many days as directed  · Sleep with your head elevated  Place an extra pillow under your head before you go to sleep to help your sinuses drain  · Do not smoke and avoid secondhand smoke  If you smoke, it is never too late to quit  Ask for information about how to stop smoking if you need help  Prevent the spread of germs that cause sinusitis:  Wash your hands often with soap and water  Wash your hands after you use the bathroom, change a child's diaper, or sneeze  Wash your hands before you prepare or eat food  Follow up with your healthcare provider as directed:  Write down your questions so you remember to ask them during your visits  CARE AGREEMENT:   You have the right to help plan your care  Learn about your health condition and how it may be treated  Discuss treatment options with your caregivers to decide what care you want to receive  You always have the right to refuse treatment  The above information is an  only  It is not intended as medical advice for individual conditions or treatments  Talk to your doctor, nurse or pharmacist before following any medical regimen to see if it is safe and effective for you  © 2014 [address] is for End User's use only and may not be sold, redistributed or otherwise used for commercial purposes  All illustrations and images included in CareNotes® are the copyrighted property of [employer]  or [employer]  Allergic Rhinitis   AMBULATORY CARE:   Allergic rhinitis , or hay fever, is swelling of the inside of your nose  The swelling is a reaction to allergens in the air  An allergen can be anything that causes an allergic reaction   Allergies to weeds, grass, trees, or mold often cause seasonal allergic rhinitis  Indoor dust mites, cockroaches, pet dander, or mold can also cause allergic rhinitis  Common signs and symptoms include the following:   · Sneezing    · Nasal congestion    · Runny nose    · Itchy nose, eyes, or mouth    · Red, watery eyes    · Postnasal drip (nasal drainage down the back of your throat)    · Cough or frequent throat clearing    · Feeling tired or lethargic    · Dark circles under your eyes  Call 911 for the following:   · You have chest pain or shortness of breath  Seek care immediately if:   · You have severe pain  · You cough up blood  Contact your healthcare provider if:   · You have a fever  · You have ear or sinus pain, or a headache  · Your symptoms get worse, even after treatment  · You have yellow, green, brown, or bloody mucus coming from your nose  · Your nose is bleeding or you have pain inside your nose  · You have trouble sleeping because of your symptoms  · You have questions or concerns about your condition or care  Treatment:   · Antihistamines  help reduce itching, sneezing, and a runny nose  Some antihistamines can make you sleepy  · Nasal steroids  help decrease inflammation in your nose  · Decongestants  help clear your stuffy nose  · Immunotherapy  may be needed if your symptoms are severe or other treatments do not work  Immunotherapy is used to inject an allergen into your skin  At first, the therapy contains tiny amounts of the allergen  Your healthcare provider will slowly increase the amount of allergen  This may help your body be less sensitive to the allergen and stop reacting to it  You may need immunotherapy for weeks or longer  Manage allergic rhinitis:  The best way to manage allergic rhinitis is to avoid allergens that can trigger your symptoms   Any of the following may help decrease your symptoms:  · Rinse your nose and sinuses  with a salt water solution or use a salt water nasal spray  This will help thin the mucus in your nose and rinse away pollen and dirt  It will also help reduce swelling so you can breathe normally  Ask your healthcare provider how often to rinse your nose  · Reduce exposure to dust mites  Wash sheets and towels in hot water every week  Cover your pillows and mattresses with allergen-free covers  Limit the number of stuffed animals and soft toys your child has  Wash your child's toys in hot water regularly  Vacuum weekly and use a vacuum  with an air filter  If possible, get rid of carpets and curtains  These collect dust and dust mites  · Reduce exposure to pollen  Keep windows and doors closed in your house and car  Stay inside when air pollution or the pollen count is high  Run your air conditioner on recycle, and change air filters often  Shower and wash your hair before bed every night to rinse away pollen  · Reduce exposure to pet dander  If possible, do not keep cats, dogs, birds, or other pets  If you do keep pets in your home, keep them out of bedrooms and carpeted rooms  Bathe them often  · Reduce exposure to mold  Do not spend time in basements  Choose artificial plants instead of live plants  Keep your home's humidity at less than 45%  Do not have ponds or standing water in your home or yard  · Do not smoke  Avoid others who smoke  Ask your healthcare provider for information if you currently smoke and need help to quit  Follow up with your healthcare provider as directed:  Write down your questions so you remember to ask them during your visits  © 2017 [address] Information is for End User's use only and may not be sold, redistributed or otherwise used for commercial purposes  All illustrations and images included in CareNotes® are the copyrighted property of [employer]  or [employer]  The above information is an  only   It is not intended as medical advice for individual conditions or treatments  Talk to your doctor, nurse or pharmacist before following any medical regimen to see if it is safe and effective for you

## 2019-10-14 NOTE — PROGRESS NOTES
[facility]        NAME: Salo Lara is a 80 y o  female  : 3/29/1921    MRN: [id number]  DATE: 2019  TIME: 12:29 PM    Assessment and Plan   Acute pansinusitis, recurrence not specified [J01 40]  1  Acute pansinusitis, recurrence not specified  amoxicillin (AMOXIL) 875 mg tablet    fluticasone (FLONASE) 50 mcg/act nasal spray   2  Ear pain, bilateral           Patient Instructions     Patient Instructions     Take amoxicillin as ordered until completed  Use the flonase spray (steroid nasal spray) as discussed  You may continue using the saline rinses and the [facility] [place]  Sinusitis, Ambulatory Care   GENERAL INFORMATION:   Sinusitis  is inflammation or infection of your sinuses  It is most often caused by a virus  Acute sinusitis may last up to 12 weeks  Chronic sinusitis lasts longer than 12 weeks  Recurrent sinusitis is when you have 3 or more episodes of sinusitis in 1 year  Common symptoms include the following:   · Fever    · Pain, pressure, redness, or swelling around the forehead, cheeks, or eyes    · Thick yellow or green discharge from your nose    · Tenderness when you touch your face over your sinuses    · Dry cough that happens mostly at night or when you lie down    · Headache and face pain that is worse when you lean forward    · Teeth pain or pain when you chew  Seek immediate care for the following symptoms:   · Vision changes such as double vision    · Confusion or trouble thinking clearly    · Headache and stiff neck    · Trouble breathing  Treatment for sinusitis  may include medicines to relieve nasal and sinus congestion or to decrease pain and fever  Ask your healthcare provider which medicines you should take and how much is safe  Manage sinusitis:   · Drink liquids as directed  Ask your healthcare provider how much liquid to drink each day and which liquids are best for you  Liquids will help loosen and drain the mucus in your sinuses      · Breathe in steam  Heat a bowl of water until you see steam  Lean over the bowl and make a tent over your head with a large towel  Breathe deeply for about 20 minutes  Be careful not to get too close to the steam or burn yourself  Do this 3 times a day  You can also breathe deeply when you take a hot shower  · Rinse your sinuses  Use a sinus rinse device to rinse your nasal passages with a saline (salt water) solution  This will help thin the mucus in your nose and rinse away pollen and dirt  It will also help reduce swelling so you can breathe normally  Ask how often to do this  · Use heat on your sinuses  to decrease pain  Apply heat for 15 to 20 minutes every hour for as many days as directed  · Sleep with your head elevated  Place an extra pillow under your head before you go to sleep to help your sinuses drain  · Do not smoke and avoid secondhand smoke  If you smoke, it is never too late to quit  Ask for information about how to stop smoking if you need help  Prevent the spread of germs that cause sinusitis:  Wash your hands often with soap and water  Wash your hands after you use the bathroom, change a child's diaper, or sneeze  Wash your hands before you prepare or eat food  Follow up with your healthcare provider as directed:  Write down your questions so you remember to ask them during your visits  CARE AGREEMENT:   You have the right to help plan your care  Learn about your health condition and how it may be treated  Discuss treatment options with your caregivers to decide what care you want to receive  You always have the right to refuse treatment  The above information is an  only  It is not intended as medical advice for individual conditions or treatments  Talk to your doctor, nurse or pharmacist before following any medical regimen to see if it is safe and effective for you    © 2014 [address] is for End User's use only and may not be sold, redistributed or otherwise used for commercial purposes  All illustrations and images included in CareNotes® are the copyrighted property of Matthew FLOREZ  or [facility]  Allergic Rhinitis   AMBULATORY CARE:   Allergic rhinitis , or hay fever, is swelling of the inside of your nose  The swelling is a reaction to allergens in the air  An allergen can be anything that causes an allergic reaction  Allergies to weeds, grass, trees, or mold often cause seasonal allergic rhinitis  Indoor dust mites, cockroaches, pet dander, or mold can also cause allergic rhinitis  Common signs and symptoms include the following:   · Sneezing    · Nasal congestion    · Runny nose    · Itchy nose, eyes, or mouth    · Red, watery eyes    · Postnasal drip (nasal drainage down the back of your throat)    · Cough or frequent throat clearing    · Feeling tired or lethargic    · Dark circles under your eyes  Call 911 for the following:   · You have chest pain or shortness of breath  Seek care immediately if:   · You have severe pain  · You cough up blood  Contact your healthcare provider if:   · You have a fever  · You have ear or sinus pain, or a headache  · Your symptoms get worse, even after treatment  · You have yellow, green, brown, or bloody mucus coming from your nose  · Your nose is bleeding or you have pain inside your nose  · You have trouble sleeping because of your symptoms  · You have questions or concerns about your condition or care  Treatment:   · Antihistamines  help reduce itching, sneezing, and a runny nose  Some antihistamines can make you sleepy  · Nasal steroids  help decrease inflammation in your nose  · Decongestants  help clear your stuffy nose  · Immunotherapy  may be needed if your symptoms are severe or other treatments do not work  Immunotherapy is used to inject an allergen into your skin  At first, the therapy contains tiny amounts of the allergen  Your healthcare provider will slowly increase the amount of allergen  This may help your body be less sensitive to the allergen and stop reacting to it  You may need immunotherapy for weeks or longer  Manage allergic rhinitis:  The best way to manage allergic rhinitis is to avoid allergens that can trigger your symptoms  Any of the following may help decrease your symptoms:  · Rinse your nose and sinuses  with a salt water solution or use a salt water nasal spray  This will help thin the mucus in your nose and rinse away pollen and dirt  It will also help reduce swelling so you can breathe normally  Ask your healthcare provider how often to rinse your nose  · Reduce exposure to dust mites  Wash sheets and towels in hot water every week  Cover your pillows and mattresses with allergen-free covers  Limit the number of stuffed animals and soft toys your child has  Wash your child's toys in hot water regularly  Vacuum weekly and use a vacuum  with an air filter  If possible, get rid of carpets and curtains  These collect dust and dust mites  · Reduce exposure to pollen  Keep windows and doors closed in your house and car  Stay inside when air pollution or the pollen count is high  Run your air conditioner on recycle, and change air filters often  Shower and wash your hair before bed every night to rinse away pollen  · Reduce exposure to pet dander  If possible, do not keep cats, dogs, birds, or other pets  If you do keep pets in your home, keep them out of bedrooms and carpeted rooms  Bathe them often  · Reduce exposure to mold  Do not spend time in basements  Choose artificial plants instead of live plants  Keep your home's humidity at less than 45%  Do not have ponds or standing water in your home or yard  · Do not smoke  Avoid others who smoke  Ask your healthcare provider for information if you currently smoke and need help to quit    Follow up with your healthcare provider as directed: Write down your questions so you remember to ask them during your visits  © 2017 [address] Information is for End User's use only and may not be sold, redistributed or otherwise used for commercial purposes  All illustrations and images included in CareNotes® are the copyrighted property of [employer]  or [facility]  The above information is an  only  It is not intended as medical advice for individual conditions or treatments  Talk to your doctor, nurse or pharmacist before following any medical regimen to see if it is safe and effective for you  Follow up with PCP in 3-5 days  Proceed to  ER if symptoms worsen  Chief Complaint     Chief Complaint   Patient presents with    Earache     Pt c/o bilateral ear pain and sinus congestion for three days  History of Present Illness       Patient has been significantly congested with ear pain over at least 3 days, her daughter thinks probably a few days longer  She has a history of recurrent sinus infections and ear problems with fluid backing up and sometimes true ear infections  She has seen ENT in the past       Review of Systems   Review of Systems   HENT: Positive for congestion, ear pain, postnasal drip, rhinorrhea, sinus pressure and sinus pain  Negative for ear discharge  Allergic/Immunologic: Positive for environmental allergies  Hematological: Positive for adenopathy  All other systems reviewed and are negative          Current Medications       Current Outpatient Medications:     amoxicillin (AMOXIL) 875 mg tablet, Take 1 tablet (875 mg total) by mouth 2 (two) times a day for 10 days, Disp: 20 tablet, Rfl: 0    fluticasone (FLONASE) 50 mcg/act nasal spray, 2 sprays into each nostril daily, Disp: 1 Bottle, Rfl: 0    gabapentin (NEURONTIN) 800 mg tablet, Take by mouth, Disp: , Rfl:     hydrochlorothiazide (MICROZIDE) 12 5 mg capsule, Take by mouth, Disp: , Rfl:     potassium chloride (K-DUR,KLOR-CON) 20 mEq tablet, Take 1 tablet (20 mEq total) by mouth daily, Disp: 30 tablet, Rfl: 5    pravastatin (PRAVACHOL) 80 mg tablet, Take by mouth, Disp: , Rfl:     verapamil (VERELAN PM) 180 MG 24 hr capsule, Take by mouth, Disp: , Rfl:     Current Allergies     Allergies as of 10/14/2019    (No Known Allergies)            The following portions of the patient's history were reviewed and updated as appropriate: allergies, current medications, past family history, past medical history, past social history, past surgical history and problem list      No past medical history on file  No past surgical history on file  No family history on file  Medications have been verified  Objective   /74   Pulse 75   Temp 97 7 °F (36 5 °C)   Resp 16   SpO2 98%        Physical Exam     Physical Exam   Constitutional: She is oriented to person, place, and time  She appears well-developed and well-nourished  She is cooperative  Non-toxic appearance  No distress  HENT:   Head: Normocephalic and atraumatic  Right Ear: Hearing, external ear and ear canal normal  There is tenderness  Tympanic membrane is bulging  Tympanic membrane is not injected, not perforated and not erythematous  A middle ear effusion is present  Left Ear: Hearing, external ear and ear canal normal  There is tenderness  Tympanic membrane is bulging  Tympanic membrane is not injected, not perforated and not erythematous  No middle ear effusion  Nose: Mucosal edema, rhinorrhea and sinus tenderness present  Right sinus exhibits maxillary sinus tenderness and frontal sinus tenderness  Left sinus exhibits maxillary sinus tenderness and frontal sinus tenderness  Mouth/Throat: Uvula is midline, oropharynx is clear and moist and mucous membranes are normal  No oropharyngeal exudate, posterior oropharyngeal edema, posterior oropharyngeal erythema or tonsillar abscesses  Tonsils are 1+ on the right   Tonsils are 1+ on the left  No tonsillar exudate  Eyes: Pupils are equal, round, and reactive to light  Neck: Normal range of motion  Neck supple  Cardiovascular: Normal rate, regular rhythm and normal heart sounds  Pulmonary/Chest: Effort normal and breath sounds normal  No accessory muscle usage or stridor  No apnea, no tachypnea and no bradypnea  No respiratory distress  She has no decreased breath sounds  She has no wheezes  She has no rhonchi  She has no rales  Abdominal: Soft  Bowel sounds are normal  She exhibits no distension  There is no tenderness  Musculoskeletal: Normal range of motion  Lymphadenopathy:     She has cervical adenopathy  Neurological: She is alert and oriented to person, place, and time  Skin: Skin is warm and dry  Capillary refill takes less than 2 seconds  She is not diaphoretic  Psychiatric: She has a normal mood and affect  Her behavior is normal  Judgment and thought content normal    Nursing note and vitals reviewed

## 2019-11-04 ENCOUNTER — OFFICE VISIT (OUTPATIENT)
Dept: NEPHROLOGY | Facility: CLINIC | Age: 84
End: 2019-11-04
Payer: MEDICARE

## 2019-11-04 VITALS
HEIGHT: 58 IN | BODY MASS INDEX: 25.61 KG/M2 | SYSTOLIC BLOOD PRESSURE: 148 MMHG | HEART RATE: 99 BPM | DIASTOLIC BLOOD PRESSURE: 88 MMHG | WEIGHT: 122 LBS

## 2019-11-04 DIAGNOSIS — I10 ESSENTIAL HYPERTENSION: Primary | ICD-10-CM

## 2019-11-04 DIAGNOSIS — K21.9 GASTROESOPHAGEAL REFLUX DISEASE WITHOUT ESOPHAGITIS: ICD-10-CM

## 2019-11-04 DIAGNOSIS — E78.00 PURE HYPERCHOLESTEROLEMIA: ICD-10-CM

## 2019-11-04 DIAGNOSIS — F41.9 ANXIETY: ICD-10-CM

## 2019-11-04 DIAGNOSIS — H90.3 SENSORINEURAL HEARING LOSS (SNHL) OF BOTH EARS: ICD-10-CM

## 2019-11-04 PROCEDURE — 99214 OFFICE O/P EST MOD 30 MIN: CPT | Performed by: INTERNAL MEDICINE

## 2019-11-04 RX ORDER — HYDROCHLOROTHIAZIDE 12.5 MG/1
25 CAPSULE, GELATIN COATED ORAL EVERY MORNING
Qty: 60 CAPSULE | Refills: 4 | Status: SHIPPED | OUTPATIENT
Start: 2019-11-04 | End: 2020-07-12

## 2019-11-04 RX ORDER — PRAVASTATIN SODIUM 20 MG
20 TABLET ORAL DAILY
Qty: 90 TABLET | Refills: 3 | Status: SHIPPED | OUTPATIENT
Start: 2019-11-04 | End: 2019-11-04 | Stop reason: SDUPTHER

## 2019-11-04 RX ORDER — PRAVASTATIN SODIUM 20 MG
20 TABLET ORAL DAILY
Qty: 30 TABLET | Refills: 3 | Status: SHIPPED | OUTPATIENT
Start: 2019-11-04 | End: 2020-02-27

## 2019-11-04 RX ORDER — HYDROCHLOROTHIAZIDE 12.5 MG/1
25 CAPSULE, GELATIN COATED ORAL EVERY MORNING
Qty: 90 CAPSULE | Refills: 4 | Status: SHIPPED | OUTPATIENT
Start: 2019-11-04 | End: 2019-11-04 | Stop reason: SDUPTHER

## 2019-11-04 RX ORDER — ALPRAZOLAM 0.25 MG/1
0.25 TABLET ORAL
Qty: 30 TABLET | Refills: 5 | Status: SHIPPED | OUTPATIENT
Start: 2019-11-04 | End: 2020-01-02 | Stop reason: SDUPTHER

## 2019-11-04 RX ORDER — OMEPRAZOLE 20 MG/1
20 CAPSULE, DELAYED RELEASE ORAL DAILY
Qty: 30 CAPSULE | Refills: 4 | Status: SHIPPED | OUTPATIENT
Start: 2019-11-04 | End: 2021-03-25 | Stop reason: SDUPTHER

## 2019-11-04 RX ORDER — VERAPAMIL HYDROCHLORIDE 180 MG/1
180 CAPSULE, EXTENDED RELEASE ORAL
Qty: 90 CAPSULE | Refills: 4 | Status: SHIPPED | OUTPATIENT
Start: 2019-11-04 | End: 2019-11-04 | Stop reason: SDUPTHER

## 2019-11-04 RX ORDER — VERAPAMIL HYDROCHLORIDE 180 MG/1
180 CAPSULE, EXTENDED RELEASE ORAL
Qty: 30 CAPSULE | Refills: 4 | Status: SHIPPED | OUTPATIENT
Start: 2019-11-04 | End: 2020-04-03 | Stop reason: SDUPTHER

## 2019-11-04 RX ORDER — OMEPRAZOLE 20 MG/1
20 CAPSULE, DELAYED RELEASE ORAL DAILY
Qty: 90 CAPSULE | Refills: 4 | Status: SHIPPED | OUTPATIENT
Start: 2019-11-04 | End: 2019-11-04 | Stop reason: SDUPTHER

## 2019-11-04 NOTE — PROGRESS NOTES
[address] [facility], [place]    Name: Dayne Birmingham  YOB: 1921      Assessment/Plan:    No problem-specific Assessment & Plan notes found for this encounter  Problem List Items Addressed This Visit        Nervous and Auditory    Sensorineural hearing loss (SNHL) of both ears    Relevant Orders    Ambulatory Referral to Otolaryngology       Other    Pure hypercholesterolemia    Relevant Orders    LDL cholesterol, direct      Other Visit Diagnoses     Essential hypertension    -  Primary    138/80 eend exam    Relevant Orders    CBC and differential    Comprehensive metabolic panel    LDL cholesterol, direct    Microalbumin / creatinine urine ratio    Uric acid    Urinalysis with microscopic            Subjective:      Patient ID: Dayne Birmingham is a 80 y o  female  Says her hearing is not good  She feels like she has sinus drainage and headache her right ear is more affected than the left ear  She never had any surgery in her years  She has no problems with any of her medications and no side effects  She tries not to eat salt  The following portions of the patient's history were reviewed and updated as appropriate: allergies, current medications, past family history, past medical history, past social history, past surgical history and problem list     Review of Systems   HENT: Positive for ear pain, hearing loss and sinus pain  Social History     Socioeconomic History    Marital status:       Spouse name: None    Number of children: None    Years of education: None    Highest education level: None   Occupational History    None   Social Needs    Financial resource strain: None    Food insecurity:     Worry: None     Inability: None    Transportation needs:     Medical: None     Non-medical: None   Tobacco Use    Smoking status: None   Substance and Sexual Activity    Alcohol use: None    Drug use: None    Sexual activity: None   Lifestyle    Physical activity:     Days per week: None     Minutes per session: None    Stress: None   Relationships    Social connections:     Talks on phone: None     Gets together: None     Attends [employer] service: None     Active member of club or organization: None     Attends meetings of clubs or organizations: None     Relationship status: None    Intimate partner violence:     Fear of current or ex partner: None     Emotionally abused: None     Physically abused: None     Forced sexual activity: None   Other Topics Concern    None   Social History Narrative    None     History reviewed  No pertinent past medical history  History reviewed  No pertinent surgical history      Current Outpatient Medications:     fluticasone (FLONASE) 50 mcg/act nasal spray, 2 sprays into each nostril daily, Disp: 1 Bottle, Rfl: 0    gabapentin (NEURONTIN) 800 mg tablet, Take by mouth, Disp: , Rfl:     hydrochlorothiazide (MICROZIDE) 12 5 mg capsule, Take by mouth, Disp: , Rfl:     potassium chloride (K-DUR,KLOR-CON) 20 mEq tablet, Take 1 tablet (20 mEq total) by mouth daily, Disp: 30 tablet, Rfl: 5    pravastatin (PRAVACHOL) 80 mg tablet, Take by mouth, Disp: , Rfl:     verapamil (VERELAN PM) 180 MG 24 hr capsule, Take by mouth, Disp: , Rfl:     Lab Results   Component Value Date     04/10/2018    SODIUM 136 07/18/2019    K 4 0 07/18/2019     07/18/2019    CO2 32 07/18/2019    ANIONGAP 11 3 04/10/2018    AGAP 3 (L) 07/18/2019    BUN 16 07/18/2019    CREATININE 0 76 07/18/2019    GLUF 103 (H) 07/18/2019    CALCIUM 9 3 07/18/2019    AST 22 07/18/2019    ALT 21 07/18/2019    ALKPHOS 95 07/18/2019    PROT 6 9 04/10/2018    TP 6 9 07/18/2019    BILITOT 0 7 04/10/2018    TBILI 0 55 07/18/2019    EGFR 65 07/18/2019     Lab Results   Component Value Date    WBC 6 92 07/18/2019    HGB 13 2 07/18/2019    HCT 40 3 07/18/2019    MCV 92 07/18/2019     07/18/2019     Lab Results   Component Value Date    CHOLESTEROL 172 07/18/2019    CHOLESTEROL 189 03/13/2019    CHOLESTEROL 195 11/14/2018     Lab Results   Component Value Date    HDL 76 (H) 07/18/2019    HDL 73 (H) 03/13/2019    HDL 81 (H) 11/14/2018     Lab Results   Component Value Date    LDLCALC 73 07/18/2019    LDLCALC 88 03/13/2019    LDLCALC 93 11/14/2018     Lab Results   Component Value Date    TRIG 117 07/18/2019    TRIG 142 03/13/2019    TRIG 106 11/14/2018     No results found for: CHOLHDL  Lab Results   Component Value Date    RAA5DTZKBMZL 1 690 07/18/2019     Lab Results   Component Value Date    CALCIUM 9 3 07/18/2019     No results found for: SPEP, UPEP  No results found for: BENI JOHNSON4HUR        Objective:      /88 (BP Location: Left arm, Patient Position: Sitting, Cuff Size: Standard)   Pulse 99   Ht 4' 10" (1 473 m)   Wt 55 3 kg (122 lb)   BMI 25 50 kg/m²     138/80 end exam     Physical Exam   Constitutional: She is oriented to person, place, and time  She appears well-developed and well-nourished  No distress  HENT:   Head: Normocephalic  Nose: Nose normal    Mouth/Throat: Oropharynx is clear and moist    Dull right TM  Left ok   Eyes: Pupils are equal, round, and reactive to light  Conjunctivae are normal    Cardiovascular: Normal rate, regular rhythm and normal heart sounds  No murmur heard  Pulmonary/Chest: Effort normal and breath sounds normal  No respiratory distress  She has no wheezes  She has no rales  Abdominal: Soft  Bowel sounds are normal  She exhibits no distension  There is no tenderness  There is no rebound and no guarding  Musculoskeletal: Normal range of motion  She exhibits no edema  Neurological: She is alert and oriented to person, place, and time  Skin: Skin is warm and dry  Capillary refill takes less than 2 seconds  She is not diaphoretic  Psychiatric: She has a normal mood and affect

## 2019-11-06 DIAGNOSIS — J01.40 ACUTE PANSINUSITIS, RECURRENCE NOT SPECIFIED: ICD-10-CM

## 2019-11-06 RX ORDER — FLUTICASONE PROPIONATE 50 MCG
SPRAY, SUSPENSION (ML) NASAL
Qty: 16 G | Refills: 0 | OUTPATIENT
Start: 2019-11-06

## 2019-12-04 ENCOUNTER — APPOINTMENT (OUTPATIENT)
Dept: LAB | Facility: HOSPITAL | Age: 84
End: 2019-12-04
Attending: INTERNAL MEDICINE
Payer: MEDICARE

## 2019-12-04 ENCOUNTER — TRANSCRIBE ORDERS (OUTPATIENT)
Dept: ADMINISTRATIVE | Facility: HOSPITAL | Age: 84
End: 2019-12-04

## 2019-12-04 DIAGNOSIS — Z85.038 PERSONAL HISTORY OF COLON CANCER: ICD-10-CM

## 2019-12-04 DIAGNOSIS — E78.00 PURE HYPERCHOLESTEROLEMIA: ICD-10-CM

## 2019-12-04 DIAGNOSIS — I10 ESSENTIAL HYPERTENSION: ICD-10-CM

## 2019-12-04 DIAGNOSIS — Z85.038 PERSONAL HISTORY OF COLON CANCER: Primary | ICD-10-CM

## 2019-12-04 LAB
ALBUMIN SERPL BCP-MCNC: 4.2 G/DL (ref 3.5–5)
ALP SERPL-CCNC: 95 U/L (ref 46–116)
ALT SERPL W P-5'-P-CCNC: 19 U/L (ref 12–78)
ANION GAP SERPL CALCULATED.3IONS-SCNC: 5 MMOL/L (ref 4–13)
AST SERPL W P-5'-P-CCNC: 14 U/L (ref 5–45)
BACTERIA UR QL AUTO: ABNORMAL /HPF
BASOPHILS # BLD AUTO: 0.06 THOUSANDS/ΜL (ref 0–0.1)
BASOPHILS NFR BLD AUTO: 1 % (ref 0–1)
BILIRUB SERPL-MCNC: 0.65 MG/DL (ref 0.2–1)
BILIRUB UR QL STRIP: NEGATIVE
BUN SERPL-MCNC: 21 MG/DL (ref 5–25)
CALCIUM SERPL-MCNC: 9.6 MG/DL (ref 8.3–10.1)
CEA SERPL-MCNC: <0.5 NG/ML (ref 0–3)
CHLORIDE SERPL-SCNC: 101 MMOL/L (ref 100–108)
CLARITY UR: CLEAR
CO2 SERPL-SCNC: 32 MMOL/L (ref 21–32)
COLOR UR: YELLOW
CREAT SERPL-MCNC: 0.8 MG/DL (ref 0.6–1.3)
CREAT UR-MCNC: 128 MG/DL
EOSINOPHIL # BLD AUTO: 0.15 THOUSAND/ΜL (ref 0–0.61)
EOSINOPHIL NFR BLD AUTO: 2 % (ref 0–6)
ERYTHROCYTE [DISTWIDTH] IN BLOOD BY AUTOMATED COUNT: 12.9 % (ref 11.6–15.1)
GFR SERPL CREATININE-BSD FRML MDRD: 62 ML/MIN/1.73SQ M
GLUCOSE P FAST SERPL-MCNC: 101 MG/DL (ref 65–99)
GLUCOSE UR STRIP-MCNC: NEGATIVE MG/DL
HCT VFR BLD AUTO: 41.1 % (ref 34.8–46.1)
HGB BLD-MCNC: 13.5 G/DL (ref 11.5–15.4)
HGB UR QL STRIP.AUTO: NEGATIVE
HYALINE CASTS #/AREA URNS LPF: ABNORMAL /LPF
IMM GRANULOCYTES # BLD AUTO: 0.05 THOUSAND/UL (ref 0–0.2)
IMM GRANULOCYTES NFR BLD AUTO: 1 % (ref 0–2)
KETONES UR STRIP-MCNC: NEGATIVE MG/DL
LDLC SERPL DIRECT ASSAY-MCNC: 98 MG/DL (ref 0–100)
LEUKOCYTE ESTERASE UR QL STRIP: NEGATIVE
LYMPHOCYTES # BLD AUTO: 2.2 THOUSANDS/ΜL (ref 0.6–4.47)
LYMPHOCYTES NFR BLD AUTO: 25 % (ref 14–44)
MCH RBC QN AUTO: 30.6 PG (ref 26.8–34.3)
MCHC RBC AUTO-ENTMCNC: 32.8 G/DL (ref 31.4–37.4)
MCV RBC AUTO: 93 FL (ref 82–98)
MICROALBUMIN UR-MCNC: 43.8 MG/L (ref 0–20)
MICROALBUMIN/CREAT 24H UR: 34 MG/G CREATININE (ref 0–30)
MONOCYTES # BLD AUTO: 0.53 THOUSAND/ΜL (ref 0.17–1.22)
MONOCYTES NFR BLD AUTO: 6 % (ref 4–12)
NEUTROPHILS # BLD AUTO: 6 THOUSANDS/ΜL (ref 1.85–7.62)
NEUTS SEG NFR BLD AUTO: 65 % (ref 43–75)
NITRITE UR QL STRIP: NEGATIVE
NON-SQ EPI CELLS URNS QL MICRO: ABNORMAL /HPF
NRBC BLD AUTO-RTO: 0 /100 WBCS
PH UR STRIP.AUTO: 7 [PH]
PLATELET # BLD AUTO: 239 THOUSANDS/UL (ref 149–390)
PMV BLD AUTO: 10.5 FL (ref 8.9–12.7)
POTASSIUM SERPL-SCNC: 4 MMOL/L (ref 3.5–5.3)
PROT SERPL-MCNC: 7 G/DL (ref 6.4–8.2)
PROT UR STRIP-MCNC: ABNORMAL MG/DL
RBC # BLD AUTO: 4.41 MILLION/UL (ref 3.81–5.12)
RBC #/AREA URNS AUTO: ABNORMAL /HPF
SODIUM SERPL-SCNC: 138 MMOL/L (ref 136–145)
SP GR UR STRIP.AUTO: 1.02 (ref 1–1.03)
URATE SERPL-MCNC: 5.4 MG/DL (ref 2–6.8)
UROBILINOGEN UR QL STRIP.AUTO: 1 E.U./DL
WBC # BLD AUTO: 8.99 THOUSAND/UL (ref 4.31–10.16)
WBC #/AREA URNS AUTO: ABNORMAL /HPF

## 2019-12-04 PROCEDURE — 82378 CARCINOEMBRYONIC ANTIGEN: CPT

## 2019-12-04 PROCEDURE — 82570 ASSAY OF URINE CREATININE: CPT

## 2019-12-04 PROCEDURE — 81001 URINALYSIS AUTO W/SCOPE: CPT

## 2019-12-04 PROCEDURE — 82043 UR ALBUMIN QUANTITATIVE: CPT

## 2019-12-04 PROCEDURE — 85025 COMPLETE CBC W/AUTO DIFF WBC: CPT

## 2019-12-04 PROCEDURE — 36415 COLL VENOUS BLD VENIPUNCTURE: CPT

## 2019-12-04 PROCEDURE — 80053 COMPREHEN METABOLIC PANEL: CPT

## 2019-12-04 PROCEDURE — 84550 ASSAY OF BLOOD/URIC ACID: CPT

## 2019-12-04 PROCEDURE — 83721 ASSAY OF BLOOD LIPOPROTEIN: CPT

## 2019-12-24 DIAGNOSIS — G89.29 OTHER CHRONIC PAIN: Primary | ICD-10-CM

## 2019-12-24 RX ORDER — OXYCODONE HYDROCHLORIDE AND ACETAMINOPHEN 5; 325 MG/1; MG/1
1 TABLET ORAL EVERY 4 HOURS PRN
Qty: 30 TABLET | Refills: 0 | Status: SHIPPED | OUTPATIENT
Start: 2019-12-24 | End: 2020-01-29

## 2020-01-02 DIAGNOSIS — F41.9 ANXIETY: ICD-10-CM

## 2020-01-02 RX ORDER — ALPRAZOLAM 0.25 MG/1
0.25 TABLET ORAL
Qty: 30 TABLET | Refills: 5 | Status: SHIPPED | OUTPATIENT
Start: 2020-01-02 | End: 2020-05-21 | Stop reason: SDUPTHER

## 2020-01-29 DIAGNOSIS — G89.29 OTHER CHRONIC PAIN: Primary | ICD-10-CM

## 2020-01-29 DIAGNOSIS — G89.29 OTHER CHRONIC PAIN: ICD-10-CM

## 2020-01-29 RX ORDER — OXYCODONE HYDROCHLORIDE AND ACETAMINOPHEN 5; 325 MG/1; MG/1
1 TABLET ORAL 2 TIMES DAILY PRN
Qty: 30 TABLET | Refills: 0 | Status: SHIPPED | OUTPATIENT
Start: 2020-01-29 | End: 2020-03-13 | Stop reason: SDUPTHER

## 2020-01-29 RX ORDER — OXYCODONE HYDROCHLORIDE AND ACETAMINOPHEN 5; 325 MG/1; MG/1
1 TABLET ORAL EVERY 4 HOURS PRN
Qty: 30 TABLET | Refills: 0 | OUTPATIENT
Start: 2020-01-29

## 2020-02-26 DIAGNOSIS — E78.00 PURE HYPERCHOLESTEROLEMIA: ICD-10-CM

## 2020-02-27 RX ORDER — PRAVASTATIN SODIUM 20 MG
TABLET ORAL
Qty: 30 TABLET | Refills: 0 | Status: SHIPPED | OUTPATIENT
Start: 2020-02-27 | End: 2020-03-26 | Stop reason: SDUPTHER

## 2020-03-13 DIAGNOSIS — G89.29 OTHER CHRONIC PAIN: ICD-10-CM

## 2020-03-13 RX ORDER — OXYCODONE HYDROCHLORIDE AND ACETAMINOPHEN 5; 325 MG/1; MG/1
1 TABLET ORAL 2 TIMES DAILY PRN
Qty: 30 TABLET | Refills: 0 | Status: SHIPPED | OUTPATIENT
Start: 2020-03-13 | End: 2020-05-11 | Stop reason: SDUPTHER

## 2020-03-26 DIAGNOSIS — E78.00 PURE HYPERCHOLESTEROLEMIA: ICD-10-CM

## 2020-03-26 RX ORDER — PRAVASTATIN SODIUM 20 MG
20 TABLET ORAL DAILY
Qty: 30 TABLET | Refills: 0 | Status: SHIPPED | OUTPATIENT
Start: 2020-03-26 | End: 2020-04-03

## 2020-04-03 DIAGNOSIS — E87.6 DIURETIC-INDUCED HYPOKALEMIA: ICD-10-CM

## 2020-04-03 DIAGNOSIS — T50.2X5A DIURETIC-INDUCED HYPOKALEMIA: ICD-10-CM

## 2020-04-03 DIAGNOSIS — E78.00 PURE HYPERCHOLESTEROLEMIA: ICD-10-CM

## 2020-04-03 DIAGNOSIS — I10 ESSENTIAL HYPERTENSION: Primary | ICD-10-CM

## 2020-04-03 RX ORDER — PRAVASTATIN SODIUM 20 MG
TABLET ORAL
Qty: 30 TABLET | Refills: 0 | Status: SHIPPED | OUTPATIENT
Start: 2020-04-03 | End: 2020-04-24

## 2020-04-03 RX ORDER — POTASSIUM CHLORIDE 20 MEQ/1
TABLET, EXTENDED RELEASE ORAL
Qty: 30 TABLET | Refills: 0 | Status: SHIPPED | OUTPATIENT
Start: 2020-04-03 | End: 2020-04-27

## 2020-04-08 ENCOUNTER — TELEPHONE (OUTPATIENT)
Dept: NEPHROLOGY | Facility: CLINIC | Age: 85
End: 2020-04-08

## 2020-04-08 DIAGNOSIS — H92.09 OTALGIA, UNSPECIFIED LATERALITY: Primary | ICD-10-CM

## 2020-04-08 RX ORDER — AMOXICILLIN 250 MG/1
250 CAPSULE ORAL EVERY 8 HOURS SCHEDULED
Qty: 21 CAPSULE | Refills: 0 | Status: SHIPPED | OUTPATIENT
Start: 2020-04-08 | End: 2020-04-15

## 2020-04-23 ENCOUNTER — OFFICE VISIT (OUTPATIENT)
Dept: URGENT CARE | Facility: CLINIC | Age: 85
End: 2020-04-23
Payer: MEDICARE

## 2020-04-23 VITALS
OXYGEN SATURATION: 97 % | RESPIRATION RATE: 18 BRPM | HEART RATE: 78 BPM | DIASTOLIC BLOOD PRESSURE: 73 MMHG | TEMPERATURE: 97.7 F | SYSTOLIC BLOOD PRESSURE: 162 MMHG

## 2020-04-23 DIAGNOSIS — J01.40 ACUTE PANSINUSITIS, RECURRENCE NOT SPECIFIED: Primary | ICD-10-CM

## 2020-04-23 PROCEDURE — 99213 OFFICE O/P EST LOW 20 MIN: CPT | Performed by: NURSE PRACTITIONER

## 2020-04-23 PROCEDURE — G0463 HOSPITAL OUTPT CLINIC VISIT: HCPCS | Performed by: NURSE PRACTITIONER

## 2020-04-23 RX ORDER — DOXYCYCLINE 100 MG/1
100 TABLET ORAL 2 TIMES DAILY
Qty: 10 TABLET | Refills: 0 | Status: SHIPPED | OUTPATIENT
Start: 2020-04-23 | End: 2020-04-28

## 2020-04-24 DIAGNOSIS — T50.2X5A DIURETIC-INDUCED HYPOKALEMIA: ICD-10-CM

## 2020-04-24 DIAGNOSIS — I10 ESSENTIAL HYPERTENSION: ICD-10-CM

## 2020-04-24 DIAGNOSIS — E78.00 PURE HYPERCHOLESTEROLEMIA: ICD-10-CM

## 2020-04-24 DIAGNOSIS — E87.6 DIURETIC-INDUCED HYPOKALEMIA: ICD-10-CM

## 2020-04-24 RX ORDER — PRAVASTATIN SODIUM 20 MG
TABLET ORAL
Qty: 30 TABLET | Refills: 0 | Status: SHIPPED | OUTPATIENT
Start: 2020-04-24 | End: 2020-05-19

## 2020-04-27 ENCOUNTER — TELEPHONE (OUTPATIENT)
Dept: URGENT CARE | Facility: CLINIC | Age: 85
End: 2020-04-27

## 2020-04-27 DIAGNOSIS — J01.10 ACUTE FRONTAL SINUSITIS, RECURRENCE NOT SPECIFIED: Primary | ICD-10-CM

## 2020-04-27 RX ORDER — POTASSIUM CHLORIDE 20 MEQ/1
TABLET, EXTENDED RELEASE ORAL
Qty: 30 TABLET | Refills: 0 | Status: SHIPPED | OUTPATIENT
Start: 2020-04-27 | End: 2020-05-19

## 2020-04-27 RX ORDER — AMOXICILLIN AND CLAVULANATE POTASSIUM 500; 125 MG/1; MG/1
1 TABLET, FILM COATED ORAL EVERY 12 HOURS SCHEDULED
Qty: 14 TABLET | Refills: 0 | Status: SHIPPED | OUTPATIENT
Start: 2020-04-27 | End: 2020-05-04

## 2020-05-11 DIAGNOSIS — G89.29 OTHER CHRONIC PAIN: ICD-10-CM

## 2020-05-11 RX ORDER — OXYCODONE HYDROCHLORIDE AND ACETAMINOPHEN 5; 325 MG/1; MG/1
1 TABLET ORAL 2 TIMES DAILY PRN
Qty: 30 TABLET | Refills: 0 | Status: SHIPPED | OUTPATIENT
Start: 2020-05-11 | End: 2020-07-27 | Stop reason: SDUPTHER

## 2020-05-19 DIAGNOSIS — T50.2X5A DIURETIC-INDUCED HYPOKALEMIA: ICD-10-CM

## 2020-05-19 DIAGNOSIS — I10 ESSENTIAL HYPERTENSION: ICD-10-CM

## 2020-05-19 DIAGNOSIS — E87.6 DIURETIC-INDUCED HYPOKALEMIA: ICD-10-CM

## 2020-05-19 DIAGNOSIS — E78.00 PURE HYPERCHOLESTEROLEMIA: ICD-10-CM

## 2020-05-19 RX ORDER — PRAVASTATIN SODIUM 20 MG
TABLET ORAL
Qty: 30 TABLET | Refills: 0 | Status: SHIPPED | OUTPATIENT
Start: 2020-05-19 | End: 2020-05-21 | Stop reason: SDUPTHER

## 2020-05-19 RX ORDER — POTASSIUM CHLORIDE 20 MEQ/1
TABLET, EXTENDED RELEASE ORAL
Qty: 30 TABLET | Refills: 0 | Status: SHIPPED | OUTPATIENT
Start: 2020-05-19 | End: 2020-05-21 | Stop reason: SDUPTHER

## 2020-05-21 ENCOUNTER — TELEPHONE (OUTPATIENT)
Dept: NEPHROLOGY | Facility: CLINIC | Age: 85
End: 2020-05-21

## 2020-05-21 ENCOUNTER — OFFICE VISIT (OUTPATIENT)
Dept: NEPHROLOGY | Facility: CLINIC | Age: 85
End: 2020-05-21
Payer: MEDICARE

## 2020-05-21 VITALS
TEMPERATURE: 98.4 F | HEART RATE: 78 BPM | SYSTOLIC BLOOD PRESSURE: 156 MMHG | BODY MASS INDEX: 24.35 KG/M2 | DIASTOLIC BLOOD PRESSURE: 80 MMHG | WEIGHT: 116 LBS | OXYGEN SATURATION: 97 % | HEIGHT: 58 IN

## 2020-05-21 DIAGNOSIS — T50.2X5A DIURETIC-INDUCED HYPOKALEMIA: ICD-10-CM

## 2020-05-21 DIAGNOSIS — H90.3 SENSORINEURAL HEARING LOSS (SNHL) OF BOTH EARS: ICD-10-CM

## 2020-05-21 DIAGNOSIS — F41.9 ANXIETY: ICD-10-CM

## 2020-05-21 DIAGNOSIS — E78.00 PURE HYPERCHOLESTEROLEMIA: Primary | ICD-10-CM

## 2020-05-21 DIAGNOSIS — E87.6 DIURETIC-INDUCED HYPOKALEMIA: ICD-10-CM

## 2020-05-21 DIAGNOSIS — I10 ESSENTIAL HYPERTENSION: ICD-10-CM

## 2020-05-21 DIAGNOSIS — H69.81 EUSTACHIAN TUBE DYSFUNCTION, RIGHT: ICD-10-CM

## 2020-05-21 PROBLEM — M19.90 ARTHRITIS: Status: ACTIVE | Noted: 2020-05-21

## 2020-05-21 PROCEDURE — 99214 OFFICE O/P EST MOD 30 MIN: CPT | Performed by: INTERNAL MEDICINE

## 2020-05-21 RX ORDER — POTASSIUM CHLORIDE 20 MEQ/1
20 TABLET, EXTENDED RELEASE ORAL DAILY
Qty: 30 TABLET | Refills: 6 | Status: SHIPPED | OUTPATIENT
Start: 2020-05-21 | End: 2021-01-19 | Stop reason: SDUPTHER

## 2020-05-21 RX ORDER — ALPRAZOLAM 0.25 MG/1
TABLET ORAL
COMMUNITY
Start: 2020-05-18 | End: 2020-05-21 | Stop reason: SDUPTHER

## 2020-05-21 RX ORDER — PREDNISONE 10 MG/1
10 TABLET ORAL DAILY
Qty: 10 TABLET | Refills: 1 | Status: SHIPPED | OUTPATIENT
Start: 2020-05-21 | End: 2020-09-21

## 2020-05-21 RX ORDER — PRAVASTATIN SODIUM 20 MG
20 TABLET ORAL DAILY
Qty: 30 TABLET | Refills: 6 | Status: SHIPPED | OUTPATIENT
Start: 2020-05-21 | End: 2021-02-15 | Stop reason: SDUPTHER

## 2020-05-21 RX ORDER — AMOXICILLIN AND CLAVULANATE POTASSIUM 250; 125 MG/1; MG/1
1 TABLET, FILM COATED ORAL EVERY 8 HOURS SCHEDULED
Qty: 21 TABLET | Refills: 0 | Status: SHIPPED | OUTPATIENT
Start: 2020-05-21 | End: 2020-05-28

## 2020-05-21 RX ORDER — ALPRAZOLAM 0.25 MG/1
0.25 TABLET ORAL
Qty: 30 TABLET | Refills: 5 | Status: SHIPPED | OUTPATIENT
Start: 2020-05-21 | End: 2020-11-23

## 2020-06-01 ENCOUNTER — OFFICE VISIT (OUTPATIENT)
Dept: OBGYN CLINIC | Facility: CLINIC | Age: 85
End: 2020-06-01
Payer: MEDICARE

## 2020-06-01 VITALS
WEIGHT: 111 LBS | HEIGHT: 58 IN | TEMPERATURE: 97.6 F | BODY MASS INDEX: 23.3 KG/M2 | DIASTOLIC BLOOD PRESSURE: 72 MMHG | SYSTOLIC BLOOD PRESSURE: 142 MMHG

## 2020-06-01 DIAGNOSIS — M17.12 PRIMARY OSTEOARTHRITIS OF LEFT KNEE: Primary | ICD-10-CM

## 2020-06-01 PROCEDURE — 99213 OFFICE O/P EST LOW 20 MIN: CPT | Performed by: ORTHOPAEDIC SURGERY

## 2020-06-01 PROCEDURE — 20610 DRAIN/INJ JOINT/BURSA W/O US: CPT | Performed by: ORTHOPAEDIC SURGERY

## 2020-06-01 RX ORDER — BETAMETHASONE SODIUM PHOSPHATE AND BETAMETHASONE ACETATE 3; 3 MG/ML; MG/ML
6 INJECTION, SUSPENSION INTRA-ARTICULAR; INTRALESIONAL; INTRAMUSCULAR; SOFT TISSUE
Status: COMPLETED | OUTPATIENT
Start: 2020-06-01 | End: 2020-06-01

## 2020-06-01 RX ORDER — BUPIVACAINE HYDROCHLORIDE 2.5 MG/ML
4 INJECTION, SOLUTION INFILTRATION; PERINEURAL
Status: COMPLETED | OUTPATIENT
Start: 2020-06-01 | End: 2020-06-01

## 2020-06-01 RX ADMIN — BETAMETHASONE SODIUM PHOSPHATE AND BETAMETHASONE ACETATE 6 MG: 3; 3 INJECTION, SUSPENSION INTRA-ARTICULAR; INTRALESIONAL; INTRAMUSCULAR; SOFT TISSUE at 11:12

## 2020-06-01 RX ADMIN — BUPIVACAINE HYDROCHLORIDE 4 ML: 2.5 INJECTION, SOLUTION INFILTRATION; PERINEURAL at 11:12

## 2020-07-12 DIAGNOSIS — I10 ESSENTIAL HYPERTENSION: ICD-10-CM

## 2020-07-12 RX ORDER — HYDROCHLOROTHIAZIDE 12.5 MG/1
CAPSULE, GELATIN COATED ORAL
Qty: 60 CAPSULE | Refills: 0 | Status: SHIPPED | OUTPATIENT
Start: 2020-07-12 | End: 2020-10-27 | Stop reason: SDUPTHER

## 2020-07-27 ENCOUNTER — TELEPHONE (OUTPATIENT)
Dept: OBGYN CLINIC | Facility: CLINIC | Age: 85
End: 2020-07-27

## 2020-07-27 DIAGNOSIS — G89.29 OTHER CHRONIC PAIN: ICD-10-CM

## 2020-07-27 NOTE — TELEPHONE ENCOUNTER
Dr Juanita Laguerre called on behalf of her self and her mother Connie Pereira to schedule sooner appointments than 8/24/20 for knee pain  I moved her mother up to 7/31/20 but then she told me she wanted cortisone injections  They both come in on the same day and their last injections were 6/1/20  Can they come in sooner than 8/24? Also I was not able to put Connie Pereira back into her appointment on 8/24/20 at 11:15 but Socrates Lakhani is still in with a note to r/s  They would like to Marco once opened  Best contact [phone]    Thank you

## 2020-07-27 NOTE — TELEPHONE ENCOUNTER
Please schedule an appointment for both of them  on the same date in the same time  It will take that long  I am very familiar with these patients  Double up if necessary    Thank you

## 2020-07-27 NOTE — TELEPHONE ENCOUNTER
Thank you Dr Екатерина Borges, how soon after the 6/1/20 cortisone injections can they come into [facility]

## 2020-07-29 DIAGNOSIS — F11.90 CHRONIC, CONTINUOUS USE OF OPIOIDS: Primary | ICD-10-CM

## 2020-07-29 RX ORDER — OXYCODONE HYDROCHLORIDE AND ACETAMINOPHEN 5; 325 MG/1; MG/1
1 TABLET ORAL 2 TIMES DAILY PRN
Qty: 30 TABLET | Refills: 0 | Status: SHIPPED | OUTPATIENT
Start: 2020-07-29 | End: 2020-12-21 | Stop reason: SDUPTHER

## 2020-07-29 RX ORDER — NALOXONE HYDROCHLORIDE 4 MG/.1ML
SPRAY NASAL
Qty: 1 EACH | Refills: 1 | Status: SHIPPED | OUTPATIENT
Start: 2020-07-29 | End: 2022-01-01 | Stop reason: HOSPADM

## 2020-08-03 ENCOUNTER — TELEPHONE (OUTPATIENT)
Dept: NEPHROLOGY | Facility: CLINIC | Age: 85
End: 2020-08-03

## 2020-08-03 DIAGNOSIS — J01.90 ACUTE SINUSITIS, RECURRENCE NOT SPECIFIED, UNSPECIFIED LOCATION: Primary | ICD-10-CM

## 2020-08-03 RX ORDER — AMOXICILLIN 250 MG/1
250 CAPSULE ORAL EVERY 8 HOURS SCHEDULED
Qty: 2121 CAPSULE | Refills: 0 | Status: SHIPPED | OUTPATIENT
Start: 2020-08-03 | End: 2020-08-10

## 2020-08-03 NOTE — TELEPHONE ENCOUNTER
Pts daughter calling in,  she wants to know if you could send in amoxicillin for her  She has a sinus infection and is now starting to get sinus headaches  Please advise

## 2020-08-17 ENCOUNTER — OFFICE VISIT (OUTPATIENT)
Dept: OBGYN CLINIC | Facility: CLINIC | Age: 85
End: 2020-08-17
Payer: MEDICARE

## 2020-08-17 VITALS
HEIGHT: 55 IN | DIASTOLIC BLOOD PRESSURE: 70 MMHG | SYSTOLIC BLOOD PRESSURE: 148 MMHG | BODY MASS INDEX: 27.31 KG/M2 | WEIGHT: 118 LBS | TEMPERATURE: 97.1 F

## 2020-08-17 DIAGNOSIS — M17.0 PRIMARY OSTEOARTHRITIS OF BOTH KNEES: Primary | ICD-10-CM

## 2020-08-17 PROCEDURE — 20610 DRAIN/INJ JOINT/BURSA W/O US: CPT | Performed by: PHYSICIAN ASSISTANT

## 2020-08-17 PROCEDURE — 99213 OFFICE O/P EST LOW 20 MIN: CPT | Performed by: ORTHOPAEDIC SURGERY

## 2020-08-17 RX ORDER — METHYLPREDNISOLONE ACETATE 40 MG/ML
2 INJECTION, SUSPENSION INTRA-ARTICULAR; INTRALESIONAL; INTRAMUSCULAR; SOFT TISSUE
Status: COMPLETED | OUTPATIENT
Start: 2020-08-17 | End: 2020-08-17

## 2020-08-17 RX ORDER — BUPIVACAINE HYDROCHLORIDE 2.5 MG/ML
4 INJECTION, SOLUTION INFILTRATION; PERINEURAL
Status: COMPLETED | OUTPATIENT
Start: 2020-08-17 | End: 2020-08-17

## 2020-08-17 RX ADMIN — METHYLPREDNISOLONE ACETATE 2 ML: 40 INJECTION, SUSPENSION INTRA-ARTICULAR; INTRALESIONAL; INTRAMUSCULAR; SOFT TISSUE at 14:36

## 2020-08-17 RX ADMIN — BUPIVACAINE HYDROCHLORIDE 4 ML: 2.5 INJECTION, SOLUTION INFILTRATION; PERINEURAL at 14:36

## 2020-08-17 NOTE — PROGRESS NOTES
ASSESSMENT/PLAN:    Diagnoses and all orders for this visit:    Primary osteoarthritis of both knees        Both of her knees were injected with Depo-Medrol and Marcaine  She tolerated the injections quite well  She will follow up with our office in 3 months  Return in about 3 months (around 11/17/2020)  _____________________________________________________  CHIEF COMPLAINT:  Chief Complaint   Patient presents with    Left Knee - Follow-up    Right Knee - Follow-up         SUBJECTIVE:  Nicole Hahn is a 80 y o  female with a history of primary osteoarthritis of both knees  She presents today complaining of bilateral knee pain  She would like corticosteroid injections in both knees  She denies any numbness or tingling  She denies any fever or chills       The following portions of the patient's history were reviewed and updated as appropriate: allergies, current medications, past family history, past medical history, past social history, past surgical history and problem list     PAST MEDICAL HISTORY:  Past Medical History:   Diagnosis Date    Benign essential hypertension     Cancer ([address] )     rectal and sigmoid colon     Carcinoma in situ     Carotid artery occlusion     Disorder of rectum and anus     Disorder of skin     Diverticular disease of colon     Edema     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     Essential hypertension     Gastroesophageal reflux disease     Herpes zoster without complication     Hyperlipidemia     Hypokalemia     Idiopathic peripheral neuropathy     Iron deficiency anemia     Localized, primary osteoarthritis     Lower leg    Malaise and fatigue     Mixed hyperlipidemia     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     Post-herpetic polyneuropathy     Unspecified osteoarthritis, unspecified site     Vitamin D deficiency        PAST SURGICAL HISTORY:  Past Surgical History:   Procedure Laterality Date    COLON SURGERY      Colon resection with open surgery     COLONOSCOPY  2013    Positive for tumor     HYSTERECTOMY      OTHER SURGICAL HISTORY      fibrous tumor    RECTAL SURGERY  09/2013    tumor       FAMILY HISTORY:  Family History   Problem Relation Age of Onset    Hypertension Father     Heart failure Father        SOCIAL HISTORY:  Social History     Tobacco Use    Smoking status: Never Smoker    Smokeless tobacco: Never Used   Substance Use Topics    Alcohol use: Not Currently     Comment: Denies alcohol use - As per [employer] Drug use: Never     Comment: Denies drug use - As per [employer]        MEDICATIONS:    Current Outpatient Medications:     diclofenac sodium (VOLTAREN) 1 %, Apply 2 g topically 4 (four) times a day, Disp: 100 g, Rfl: 3    fluticasone (FLONASE) 50 mcg/act nasal spray, 2 sprays into each nostril daily, Disp: 1 Bottle, Rfl: 10    gabapentin (NEURONTIN) 800 mg tablet, Take by mouth, Disp: , Rfl:     hydrochlorothiazide (MICROZIDE) 12 5 mg capsule, TAKE (2) CAPSULES DAILY IN THE MORNING , Disp: 60 capsule, Rfl: 0    naloxone (NARCAN) 4 mg/0 1 mL nasal spray, Administer 1 spray into a nostril   If breathing does not return to normal or if breathing difficulty resumes after 2-3 minutes, give another dose in the other nostril using a new spray , Disp: 1 each, Rfl: 1    omeprazole (PriLOSEC) 20 mg delayed release capsule, Take 1 capsule (20 mg total) by mouth daily, Disp: 30 capsule, Rfl: 4    oxyCODONE-acetaminophen (PERCOCET) 5-325 mg per tablet, Take 1 tablet by mouth 2 (two) times a day as needed for moderate painMax Daily Amount: 2 tablets, Disp: 30 tablet, Rfl: 0    potassium chloride (K-DUR,KLOR-CON) 20 mEq tablet, Take 1 tablet (20 mEq total) by mouth daily, Disp: 30 tablet, Rfl: 6    pravastatin (PRAVACHOL) 20 mg tablet, Take 1 tablet (20 mg total) by mouth daily, Disp: 30 tablet, Rfl: 6    predniSONE 10 mg tablet, Take 1 tablet (10 mg total) by mouth daily 4 today, 3 tomorrow 2 then next day and 1 the last day, Disp: 10 tablet, Rfl: 1    verapamil (CALAN-SR) 180 mg CR tablet, Take 1 tablet (180 mg total) by mouth daily at bedtime, Disp: 30 tablet, Rfl: 6    ALPRAZolam (XANAX) 0 25 mg tablet, Take 1 tablet (0 25 mg total) by mouth daily at bedtime as needed for anxiety, Disp: 30 tablet, Rfl: 5    fluticasone (FLONASE) 50 mcg/act nasal spray, 2 sprays into each nostril daily, Disp: 1 Bottle, Rfl: 0    ALLERGIES:  No Known Allergies    ROS:  Review of Systems     Constitutional: Negative for fatigue, fever or loss of apetite  HENT: Negative  Respiratory: Negative for shortness of breath, dyspnea  Cardiovascular: Negative for chest pain/tightness  Gastrointestinal: Negative for abdominal pain, N/V  Endocrine: Negative for cold/heat intolerance, unexplained weight loss/gain  Genitourinary: Negative for flank pain, dysuria, hematuria  Musculoskeletal: Positive for arthralgia   Skin: Negative for rash  Neurological: Negative for numbness or tingling  Psychiatric/Behavioral: Negative for agitation  _____________________________________________________  PHYSICAL EXAMINATION:    Blood pressure 148/70, temperature (!) 97 1 °F (36 2 °C), height 4' 6" (1 372 m), weight 53 5 kg (118 lb)  Constitutional: Oriented to person, place, and time  Appears well-developed and well-nourished  No distress  HENT:   Head: Normocephalic  Eyes: Conjunctivae are normal  Right eye exhibits no discharge  Left eye exhibits no discharge  No scleral icterus  Cardiovascular: Normal rate  Pulmonary/Chest: Effort normal    Neurological: Alert and oriented to person, place, and time  Skin: Skin is warm and dry  No rash noted  Not diaphoretic  No erythema  No pallor  Psychiatric: Normal mood and affect   Behavior is normal  Judgment and thought content normal       MUSCULOSKELETAL EXAMINATION:   Physical Exam  Ortho Exam    Bilateral lower extremities are neurovascularly intact  Toes are pink and mobile   Compartments are soft  No warmth, erythema or ecchymosis  ROM of knees are from 5-115 degrees  Negative Lachman, drawer or pivot shift  No medial instability  Medial joint line tenderness, slight lateral joint line tenderness  Patellofemoral crepitation    Objective:  BP Readings from Last 1 Encounters:   08/17/20 148/70      Wt Readings from Last 1 Encounters:   08/17/20 53 5 kg (118 lb)        BMI:   Estimated body mass index is 28 45 kg/m² as calculated from the following:    Height as of this encounter: 4' 6" (1 372 m)  Weight as of this encounter: 53 5 kg (118 lb)        PROCEDURES PERFORMED:  Large joint arthrocentesis: bilateral knee  Date/Time: 8/17/2020 2:36 PM  Consent given by: patient  Site marked: site marked  Timeout: Immediately prior to procedure a time out was called to verify the correct patient, procedure, equipment, support staff and site/side marked as required   Supporting Documentation  Indications: pain   Procedure Details  Location: knee - bilateral knee  Needle size: 22 G  Approach: lateral    Medications (Right): 4 mL bupivacaine 0 25 %; 2 mL methylPREDNISolone acetate 40 mg/mLMedications (Left): 4 mL bupivacaine 0 25 %; 2 mL methylPREDNISolone acetate 40 mg/mL   Patient tolerance: patient tolerated the procedure well with no immediate complications  Dressing:  Sterile dressing applied            Marla Martinez PA-C

## 2020-09-16 ENCOUNTER — LAB (OUTPATIENT)
Dept: LAB | Facility: CLINIC | Age: 85
End: 2020-09-16
Payer: MEDICARE

## 2020-09-16 DIAGNOSIS — E78.00 PURE HYPERCHOLESTEROLEMIA: ICD-10-CM

## 2020-09-16 DIAGNOSIS — E87.6 DIURETIC-INDUCED HYPOKALEMIA: ICD-10-CM

## 2020-09-16 DIAGNOSIS — I10 ESSENTIAL HYPERTENSION: ICD-10-CM

## 2020-09-16 DIAGNOSIS — T50.2X5A DIURETIC-INDUCED HYPOKALEMIA: ICD-10-CM

## 2020-09-16 LAB
ALBUMIN SERPL BCP-MCNC: 3.9 G/DL (ref 3.5–5)
ALP SERPL-CCNC: 101 U/L (ref 46–116)
ALT SERPL W P-5'-P-CCNC: 24 U/L (ref 12–78)
ANION GAP SERPL CALCULATED.3IONS-SCNC: 10 MMOL/L (ref 4–13)
AST SERPL W P-5'-P-CCNC: 25 U/L (ref 5–45)
BACTERIA UR QL AUTO: NORMAL /HPF
BASOPHILS # BLD AUTO: 0.03 THOUSANDS/ΜL (ref 0–0.1)
BASOPHILS NFR BLD AUTO: 0 % (ref 0–1)
BILIRUB SERPL-MCNC: 0.8 MG/DL (ref 0.2–1)
BILIRUB UR QL STRIP: NEGATIVE
BUN SERPL-MCNC: 18 MG/DL (ref 5–25)
CALCIUM SERPL-MCNC: 9.7 MG/DL (ref 8.3–10.1)
CHLORIDE SERPL-SCNC: 99 MMOL/L (ref 100–108)
CHOLEST SERPL-MCNC: 234 MG/DL (ref 50–200)
CLARITY UR: CLEAR
CO2 SERPL-SCNC: 27 MMOL/L (ref 21–32)
COLOR UR: YELLOW
CREAT SERPL-MCNC: 0.61 MG/DL (ref 0.6–1.3)
CREAT UR-MCNC: 63.1 MG/DL
CREAT UR-MCNC: 63.1 MG/DL
EOSINOPHIL # BLD AUTO: 0.15 THOUSAND/ΜL (ref 0–0.61)
EOSINOPHIL NFR BLD AUTO: 2 % (ref 0–6)
ERYTHROCYTE [DISTWIDTH] IN BLOOD BY AUTOMATED COUNT: 13.2 % (ref 11.6–15.1)
GFR SERPL CREATININE-BSD FRML MDRD: 75 ML/MIN/1.73SQ M
GLUCOSE P FAST SERPL-MCNC: 83 MG/DL (ref 65–99)
GLUCOSE UR STRIP-MCNC: NEGATIVE MG/DL
HCT VFR BLD AUTO: 42.8 % (ref 34.8–46.1)
HDLC SERPL-MCNC: 128 MG/DL
HGB BLD-MCNC: 13.8 G/DL (ref 11.5–15.4)
HGB UR QL STRIP.AUTO: NEGATIVE
HYALINE CASTS #/AREA URNS LPF: NORMAL /LPF
IMM GRANULOCYTES # BLD AUTO: 0.03 THOUSAND/UL (ref 0–0.2)
IMM GRANULOCYTES NFR BLD AUTO: 0 % (ref 0–2)
KETONES UR STRIP-MCNC: NEGATIVE MG/DL
LDLC SERPL CALC-MCNC: 89 MG/DL (ref 0–100)
LEUKOCYTE ESTERASE UR QL STRIP: NEGATIVE
LYMPHOCYTES # BLD AUTO: 3.07 THOUSANDS/ΜL (ref 0.6–4.47)
LYMPHOCYTES NFR BLD AUTO: 38 % (ref 14–44)
MCH RBC QN AUTO: 30.3 PG (ref 26.8–34.3)
MCHC RBC AUTO-ENTMCNC: 32.2 G/DL (ref 31.4–37.4)
MCV RBC AUTO: 94 FL (ref 82–98)
MICROALBUMIN UR-MCNC: 23.2 MG/L (ref 0–20)
MICROALBUMIN/CREAT 24H UR: 37 MG/G CREATININE (ref 0–30)
MONOCYTES # BLD AUTO: 0.62 THOUSAND/ΜL (ref 0.17–1.22)
MONOCYTES NFR BLD AUTO: 8 % (ref 4–12)
NEUTROPHILS # BLD AUTO: 4.11 THOUSANDS/ΜL (ref 1.85–7.62)
NEUTS SEG NFR BLD AUTO: 52 % (ref 43–75)
NITRITE UR QL STRIP: NEGATIVE
NON-SQ EPI CELLS URNS QL MICRO: NORMAL /HPF
NONHDLC SERPL-MCNC: 106 MG/DL
NRBC BLD AUTO-RTO: 0 /100 WBCS
PH UR STRIP.AUTO: 7 [PH]
PLATELET # BLD AUTO: 279 THOUSANDS/UL (ref 149–390)
PMV BLD AUTO: 9.9 FL (ref 8.9–12.7)
POTASSIUM SERPL-SCNC: 3.8 MMOL/L (ref 3.5–5.3)
PROT SERPL-MCNC: 7.3 G/DL (ref 6.4–8.2)
PROT UR STRIP-MCNC: NEGATIVE MG/DL
PROT UR-MCNC: 14 MG/DL
PROT/CREAT UR: 0.22 MG/G{CREAT} (ref 0–0.1)
RBC # BLD AUTO: 4.55 MILLION/UL (ref 3.81–5.12)
RBC #/AREA URNS AUTO: NORMAL /HPF
SODIUM SERPL-SCNC: 136 MMOL/L (ref 136–145)
SP GR UR STRIP.AUTO: 1.01 (ref 1–1.03)
TRIGL SERPL-MCNC: 84 MG/DL
URATE SERPL-MCNC: 5.4 MG/DL (ref 2–6.8)
UROBILINOGEN UR QL STRIP.AUTO: 1 E.U./DL
WBC # BLD AUTO: 8.01 THOUSAND/UL (ref 4.31–10.16)
WBC #/AREA URNS AUTO: NORMAL /HPF

## 2020-09-16 PROCEDURE — 80061 LIPID PANEL: CPT

## 2020-09-16 PROCEDURE — 85025 COMPLETE CBC W/AUTO DIFF WBC: CPT

## 2020-09-16 PROCEDURE — 36415 COLL VENOUS BLD VENIPUNCTURE: CPT

## 2020-09-16 PROCEDURE — 80053 COMPREHEN METABOLIC PANEL: CPT

## 2020-09-16 PROCEDURE — 82570 ASSAY OF URINE CREATININE: CPT

## 2020-09-16 PROCEDURE — 84156 ASSAY OF PROTEIN URINE: CPT

## 2020-09-16 PROCEDURE — 84550 ASSAY OF BLOOD/URIC ACID: CPT

## 2020-09-16 PROCEDURE — 82043 UR ALBUMIN QUANTITATIVE: CPT

## 2020-09-16 PROCEDURE — 81001 URINALYSIS AUTO W/SCOPE: CPT

## 2020-09-21 ENCOUNTER — OFFICE VISIT (OUTPATIENT)
Dept: NEPHROLOGY | Facility: CLINIC | Age: 85
End: 2020-09-21
Payer: MEDICARE

## 2020-09-21 VITALS
HEART RATE: 72 BPM | HEIGHT: 55 IN | TEMPERATURE: 97.3 F | WEIGHT: 118 LBS | BODY MASS INDEX: 27.31 KG/M2 | SYSTOLIC BLOOD PRESSURE: 148 MMHG | DIASTOLIC BLOOD PRESSURE: 72 MMHG | OXYGEN SATURATION: 97 %

## 2020-09-21 DIAGNOSIS — E78.00 PURE HYPERCHOLESTEROLEMIA: ICD-10-CM

## 2020-09-21 DIAGNOSIS — H90.3 SENSORINEURAL HEARING LOSS (SNHL) OF BOTH EARS: ICD-10-CM

## 2020-09-21 DIAGNOSIS — M17.0 PRIMARY OSTEOARTHRITIS OF BOTH KNEES: ICD-10-CM

## 2020-09-21 DIAGNOSIS — I10 ESSENTIAL HYPERTENSION: Primary | ICD-10-CM

## 2020-09-21 DIAGNOSIS — M17.12 PRIMARY OSTEOARTHRITIS OF LEFT KNEE: ICD-10-CM

## 2020-09-21 PROCEDURE — 99214 OFFICE O/P EST MOD 30 MIN: CPT | Performed by: INTERNAL MEDICINE

## 2020-09-21 NOTE — PROGRESS NOTES
[address] [facility] MD    Name: Nicole Hahn  YOB: 1921      Assessment/Plan:         Problem List Items Addressed This Visit        Cardiovascular and Mediastinum    Essential hypertension - Primary     Blood pressure is stable            Nervous and Auditory    Sensorineural hearing loss (SNHL) of both ears       Musculoskeletal and Integument    Primary osteoarthritis of left knee     Using Voltaren Gel with relief            Other    Pure hypercholesterolemia     Continue treatment  Has a wonderful of HDL of 128         Primary osteoarthritis of both knees            Subjective:      Patient ID: Nicole Hahn is a 80 y o  female  HPI1 She has hypertension and osteoarthritis  Says she doesn't take oxycodone frequently  She gets benefit from diclofenac gel  Has CKD 2  She always ate fresh vegetables  Kidney function has improved from 62 to 75     The following portions of the patient's history were reviewed and updated as appropriate: allergies, current medications, past family history, past medical history, past social history, past surgical history and problem list     Review of Systems   Constitutional: Negative for activity change, appetite change, fatigue and fever  HENT: Positive for hearing loss  Eyes: Negative for visual disturbance  Respiratory: Negative for cough, chest tightness and shortness of breath  Cardiovascular: Positive for chest pain  Negative for palpitations and leg swelling  LUQ described as gas like and resolves with passing gas   Gastrointestinal: Negative for abdominal pain, blood in stool, diarrhea, nausea and vomiting  Genitourinary: Negative for decreased urine volume, dysuria and urgency  Musculoskeletal: Positive for arthralgias, back pain and joint swelling  Skin: Negative for color change  Neurological: Negative for dizziness, weakness and light-headedness     Hematological: Does not bruise/bleed easily  Psychiatric/Behavioral: Negative for dysphoric mood  Social History     Socioeconomic History    Marital status:       Spouse name: None    Number of children: None    Years of education: None    Highest education level: None   Occupational History    Occupation: Retired    Social Needs    Financial resource strain: None    Food insecurity     Worry: None     Inability: None    Transportation needs     Medical: None     Non-medical: None   Tobacco Use    Smoking status: Never Smoker    Smokeless tobacco: Never Used   Substance and Sexual Activity    Alcohol use: Not Currently     Comment: Denies alcohol use - As per [facility] Drug use: Never     Comment: Denies drug use - As per [employer]     Sexual activity: None   Lifestyle    Physical activity     Days per week: None     Minutes per session: None    Stress: None   Relationships    Social connections     Talks on phone: None     Gets together: None     Attends Yaz[employer] service: None     Active member of club or organization: None     Attends meetings of clubs or organizations: None     Relationship status: None    Intimate partner violence     Fear of current or ex partner: None     Emotionally abused: None     Physically abused: None     Forced sexual activity: None   Other Topics Concern    None   Social History Narrative    None     Past Medical History:   Diagnosis Date    Benign essential hypertension     Cancer [address] [address] )     rectal and sigmoid colon     Carcinoma in situ     Carotid artery occlusion     Disorder of rectum and anus     Disorder of skin     Diverticular disease of colon     Edema     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     Essential hypertension     Gastroesophageal reflux disease     Herpes zoster without complication     Hyperlipidemia     Hypokalemia     Idiopathic peripheral neuropathy     Iron deficiency anemia     Localized, primary osteoarthritis     Lower leg    Malaise and fatigue     Mixed hyperlipidemia     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     Post-herpetic polyneuropathy     Unspecified osteoarthritis, unspecified site     Vitamin D deficiency      Past Surgical History:   Procedure Laterality Date    COLON SURGERY      Colon resection with open surgery     COLONOSCOPY  2013    Positive for tumor     HYSTERECTOMY      OTHER SURGICAL HISTORY      fibrous tumor    RECTAL SURGERY  09/2013    tumor       Current Outpatient Medications:     diclofenac sodium (VOLTAREN) 1 %, Apply 2 g topically 4 (four) times a day, Disp: 100 g, Rfl: 3    fluticasone (FLONASE) 50 mcg/act nasal spray, 2 sprays into each nostril daily, Disp: 1 Bottle, Rfl: 10    hydrochlorothiazide (MICROZIDE) 12 5 mg capsule, TAKE (2) CAPSULES DAILY IN THE MORNING , Disp: 60 capsule, Rfl: 0    naloxone (NARCAN) 4 mg/0 1 mL nasal spray, Administer 1 spray into a nostril   If breathing does not return to normal or if breathing difficulty resumes after 2-3 minutes, give another dose in the other nostril using a new spray , Disp: 1 each, Rfl: 1    omeprazole (PriLOSEC) 20 mg delayed release capsule, Take 1 capsule (20 mg total) by mouth daily, Disp: 30 capsule, Rfl: 4    oxyCODONE-acetaminophen (PERCOCET) 5-325 mg per tablet, Take 1 tablet by mouth 2 (two) times a day as needed for moderate painMax Daily Amount: 2 tablets, Disp: 30 tablet, Rfl: 0    potassium chloride (K-DUR,KLOR-CON) 20 mEq tablet, Take 1 tablet (20 mEq total) by mouth daily, Disp: 30 tablet, Rfl: 6    pravastatin (PRAVACHOL) 20 mg tablet, Take 1 tablet (20 mg total) by mouth daily, Disp: 30 tablet, Rfl: 6    verapamil (CALAN-SR) 180 mg CR tablet, Take 1 tablet (180 mg total) by mouth daily at bedtime, Disp: 30 tablet, Rfl: 6    ALPRAZolam (XANAX) 0 25 mg tablet, Take 1 tablet (0 25 mg total) by mouth daily at bedtime as needed for anxiety, Disp: 30 tablet, Rfl: 5   fluticasone (FLONASE) 50 mcg/act nasal spray, 2 sprays into each nostril daily, Disp: 1 Bottle, Rfl: 0    gabapentin (NEURONTIN) 800 mg tablet, Take by mouth, Disp: , Rfl:     Lab Results   Component Value Date     04/10/2018    SODIUM 136 09/16/2020    K 3 8 09/16/2020    CL 99 (L) 09/16/2020    CO2 27 09/16/2020    ANIONGAP 11 3 04/10/2018    AGAP 10 09/16/2020    BUN 18 09/16/2020    CREATININE 0 61 09/16/2020    GLUF 83 09/16/2020    CALCIUM 9 7 09/16/2020    AST 25 09/16/2020    ALT 24 09/16/2020    ALKPHOS 101 09/16/2020    PROT 6 9 04/10/2018    TP 7 3 09/16/2020    BILITOT 0 7 04/10/2018    TBILI 0 80 09/16/2020    EGFR 75 09/16/2020     Lab Results   Component Value Date    WBC 8 01 09/16/2020    HGB 13 8 09/16/2020    HCT 42 8 09/16/2020    MCV 94 09/16/2020     09/16/2020     Lab Results   Component Value Date    CHOLESTEROL 234 (H) 09/16/2020    CHOLESTEROL 172 07/18/2019    CHOLESTEROL 189 03/13/2019     Lab Results   Component Value Date     09/16/2020    HDL 76 (H) 07/18/2019    HDL 73 (H) 03/13/2019     Lab Results   Component Value Date    LDLCALC 89 09/16/2020    LDLCALC 73 07/18/2019    LDLCALC 88 03/13/2019     Lab Results   Component Value Date    TRIG 84 09/16/2020    TRIG 117 07/18/2019    TRIG 142 03/13/2019     No results found for: [place], [place]  Lab Results   Component Value Date    HNJ1TOLONFVH 1 690 07/18/2019     Lab Results   Component Value Date    CALCIUM 9 7 09/16/2020     No results found for: SPEP, UPEP  No results found for: BENI [id number]  MAC 37    UA fine      Objective:      /72 (BP Location: Left arm, Patient Position: Sitting, Cuff Size: Standard)   Pulse 72   Temp (!) 97 3 °F (36 3 °C) (Temporal)   Ht 4' 6" (1 372 m)   Wt 53 5 kg (118 lb)   SpO2 97%   BMI 28 45 kg/m²          Physical Exam  Constitutional:       General: She is not in acute distress  Appearance: Normal appearance  She is normal weight  She is not toxic-appearing  HENT:      Head: Normocephalic and atraumatic  Right Ear: Ear canal and external ear normal       Left Ear: Ear canal and external ear normal    Eyes:      Extraocular Movements: Extraocular movements intact  Pupils: Pupils are equal, round, and reactive to light  Neck:      Musculoskeletal: Normal range of motion and neck supple  Cardiovascular:      Rate and Rhythm: Normal rate and regular rhythm  Heart sounds: No murmur  Pulmonary:      Effort: Pulmonary effort is normal  No respiratory distress  Breath sounds: Normal breath sounds  No wheezing or rales  Abdominal:      General: Bowel sounds are normal  There is no distension  Palpations: Abdomen is soft  Tenderness: There is no abdominal tenderness  There is no guarding  Musculoskeletal:      Right lower leg: No edema  Left lower leg: No edema  Skin:     General: Skin is warm and dry  Neurological:      General: No focal deficit present  Mental Status: She is alert and oriented to person, place, and time  Psychiatric:         Mood and Affect: Mood normal          Behavior: Behavior normal          Thought Content:  Thought content normal          Judgment: Judgment normal

## 2020-09-30 ENCOUNTER — CLINICAL SUPPORT (OUTPATIENT)
Dept: NEPHROLOGY | Facility: CLINIC | Age: 85
End: 2020-09-30
Payer: MEDICARE

## 2020-09-30 DIAGNOSIS — Z23 ENCOUNTER FOR ADMINISTRATION OF VACCINE: Primary | ICD-10-CM

## 2020-09-30 PROCEDURE — 90662 IIV NO PRSV INCREASED AG IM: CPT

## 2020-09-30 PROCEDURE — G0008 ADMIN INFLUENZA VIRUS VAC: HCPCS

## 2020-10-27 DIAGNOSIS — I10 ESSENTIAL HYPERTENSION: ICD-10-CM

## 2020-10-27 RX ORDER — HYDROCHLOROTHIAZIDE 12.5 MG/1
12.5 CAPSULE, GELATIN COATED ORAL EVERY MORNING
Qty: 30 CAPSULE | Refills: 2 | Status: SHIPPED | OUTPATIENT
Start: 2020-10-27 | End: 2021-01-19 | Stop reason: SDUPTHER

## 2020-11-23 ENCOUNTER — OFFICE VISIT (OUTPATIENT)
Dept: OBGYN CLINIC | Facility: CLINIC | Age: 85
End: 2020-11-23
Payer: MEDICARE

## 2020-11-23 VITALS
WEIGHT: 118 LBS | TEMPERATURE: 96.7 F | HEIGHT: 55 IN | SYSTOLIC BLOOD PRESSURE: 171 MMHG | HEART RATE: 78 BPM | DIASTOLIC BLOOD PRESSURE: 90 MMHG | BODY MASS INDEX: 27.31 KG/M2

## 2020-11-23 DIAGNOSIS — H90.3 SENSORINEURAL HEARING LOSS (SNHL) OF BOTH EARS: ICD-10-CM

## 2020-11-23 DIAGNOSIS — E87.6 DIURETIC-INDUCED HYPOKALEMIA: ICD-10-CM

## 2020-11-23 DIAGNOSIS — M17.12 PRIMARY OSTEOARTHRITIS OF LEFT KNEE: Primary | ICD-10-CM

## 2020-11-23 DIAGNOSIS — T50.2X5A DIURETIC-INDUCED HYPOKALEMIA: ICD-10-CM

## 2020-11-23 DIAGNOSIS — F41.9 ANXIETY: ICD-10-CM

## 2020-11-23 DIAGNOSIS — I10 ESSENTIAL HYPERTENSION: ICD-10-CM

## 2020-11-23 PROCEDURE — 99213 OFFICE O/P EST LOW 20 MIN: CPT | Performed by: ORTHOPAEDIC SURGERY

## 2020-11-23 PROCEDURE — 20610 DRAIN/INJ JOINT/BURSA W/O US: CPT | Performed by: ORTHOPAEDIC SURGERY

## 2020-11-23 RX ORDER — ALPRAZOLAM 0.25 MG/1
TABLET ORAL
Qty: 30 TABLET | Refills: 0 | Status: SHIPPED | OUTPATIENT
Start: 2020-11-23 | End: 2020-12-21 | Stop reason: SDUPTHER

## 2020-11-23 RX ORDER — BUPIVACAINE HYDROCHLORIDE 2.5 MG/ML
4 INJECTION, SOLUTION INFILTRATION; PERINEURAL
Status: COMPLETED | OUTPATIENT
Start: 2020-11-23 | End: 2020-11-23

## 2020-11-23 RX ORDER — METHYLPREDNISOLONE ACETATE 40 MG/ML
2 INJECTION, SUSPENSION INTRA-ARTICULAR; INTRALESIONAL; INTRAMUSCULAR; SOFT TISSUE
Status: COMPLETED | OUTPATIENT
Start: 2020-11-23 | End: 2020-11-23

## 2020-11-23 RX ADMIN — METHYLPREDNISOLONE ACETATE 2 ML: 40 INJECTION, SUSPENSION INTRA-ARTICULAR; INTRALESIONAL; INTRAMUSCULAR; SOFT TISSUE at 13:46

## 2020-11-23 RX ADMIN — BUPIVACAINE HYDROCHLORIDE 4 ML: 2.5 INJECTION, SOLUTION INFILTRATION; PERINEURAL at 13:46

## 2020-12-21 DIAGNOSIS — G89.29 OTHER CHRONIC PAIN: ICD-10-CM

## 2020-12-21 DIAGNOSIS — F41.9 ANXIETY: ICD-10-CM

## 2020-12-21 DIAGNOSIS — T50.2X5A DIURETIC-INDUCED HYPOKALEMIA: ICD-10-CM

## 2020-12-21 DIAGNOSIS — E87.6 DIURETIC-INDUCED HYPOKALEMIA: ICD-10-CM

## 2020-12-21 DIAGNOSIS — J01.40 ACUTE PANSINUSITIS, RECURRENCE NOT SPECIFIED: ICD-10-CM

## 2020-12-21 DIAGNOSIS — I10 ESSENTIAL HYPERTENSION: ICD-10-CM

## 2020-12-21 DIAGNOSIS — H90.3 SENSORINEURAL HEARING LOSS (SNHL) OF BOTH EARS: ICD-10-CM

## 2020-12-21 RX ORDER — FLUTICASONE PROPIONATE 50 MCG
2 SPRAY, SUSPENSION (ML) NASAL DAILY
Qty: 1 BOTTLE | Refills: 1 | Status: SHIPPED | OUTPATIENT
Start: 2020-12-21 | End: 2021-01-01 | Stop reason: SDUPTHER

## 2020-12-21 RX ORDER — ALPRAZOLAM 0.25 MG/1
0.25 TABLET ORAL
Qty: 30 TABLET | Refills: 0 | Status: SHIPPED | OUTPATIENT
Start: 2020-12-21 | End: 2021-01-19 | Stop reason: SDUPTHER

## 2020-12-21 RX ORDER — OXYCODONE HYDROCHLORIDE AND ACETAMINOPHEN 5; 325 MG/1; MG/1
1 TABLET ORAL 2 TIMES DAILY PRN
Qty: 30 TABLET | Refills: 0 | Status: SHIPPED | OUTPATIENT
Start: 2020-12-21 | End: 2021-01-01 | Stop reason: SDUPTHER

## 2021-01-01 ENCOUNTER — IMMUNIZATIONS (OUTPATIENT)
Dept: FAMILY MEDICINE CLINIC | Facility: HOSPITAL | Age: 86
End: 2021-01-01

## 2021-01-01 ENCOUNTER — OFFICE VISIT (OUTPATIENT)
Dept: OBGYN CLINIC | Facility: CLINIC | Age: 86
End: 2021-01-01
Payer: MEDICARE

## 2021-01-01 ENCOUNTER — APPOINTMENT (OUTPATIENT)
Dept: LAB | Facility: CLINIC | Age: 86
End: 2021-01-01
Payer: MEDICARE

## 2021-01-01 ENCOUNTER — TELEPHONE (OUTPATIENT)
Dept: URGENT CARE | Facility: CLINIC | Age: 86
End: 2021-01-01

## 2021-01-01 ENCOUNTER — OFFICE VISIT (OUTPATIENT)
Dept: NEPHROLOGY | Facility: CLINIC | Age: 86
End: 2021-01-01
Payer: MEDICARE

## 2021-01-01 ENCOUNTER — OFFICE VISIT (OUTPATIENT)
Dept: URGENT CARE | Facility: CLINIC | Age: 86
End: 2021-01-01
Payer: MEDICARE

## 2021-01-01 ENCOUNTER — APPOINTMENT (OUTPATIENT)
Dept: RADIOLOGY | Facility: CLINIC | Age: 86
End: 2021-01-01
Payer: MEDICARE

## 2021-01-01 ENCOUNTER — CONSULT (OUTPATIENT)
Dept: VASCULAR SURGERY | Facility: CLINIC | Age: 86
End: 2021-01-01
Payer: MEDICARE

## 2021-01-01 ENCOUNTER — TELEPHONE (OUTPATIENT)
Dept: OBGYN CLINIC | Facility: HOSPITAL | Age: 86
End: 2021-01-01

## 2021-01-01 ENCOUNTER — TELEPHONE (OUTPATIENT)
Dept: NEPHROLOGY | Facility: CLINIC | Age: 86
End: 2021-01-01

## 2021-01-01 VITALS
HEART RATE: 80 BPM | DIASTOLIC BLOOD PRESSURE: 74 MMHG | WEIGHT: 123 LBS | HEIGHT: 55 IN | SYSTOLIC BLOOD PRESSURE: 132 MMHG | BODY MASS INDEX: 28.46 KG/M2 | OXYGEN SATURATION: 98 %

## 2021-01-01 VITALS
HEART RATE: 75 BPM | BODY MASS INDEX: 27.91 KG/M2 | DIASTOLIC BLOOD PRESSURE: 72 MMHG | SYSTOLIC BLOOD PRESSURE: 124 MMHG | WEIGHT: 120.6 LBS | TEMPERATURE: 93.3 F | HEIGHT: 55 IN

## 2021-01-01 VITALS
WEIGHT: 125.6 LBS | OXYGEN SATURATION: 97 % | SYSTOLIC BLOOD PRESSURE: 124 MMHG | BODY MASS INDEX: 30.28 KG/M2 | HEART RATE: 79 BPM | DIASTOLIC BLOOD PRESSURE: 74 MMHG

## 2021-01-01 VITALS — OXYGEN SATURATION: 95 % | HEART RATE: 67 BPM | TEMPERATURE: 97.3 F | RESPIRATION RATE: 18 BRPM

## 2021-01-01 VITALS
BODY MASS INDEX: 29.66 KG/M2 | HEART RATE: 77 BPM | HEIGHT: 55 IN | SYSTOLIC BLOOD PRESSURE: 185 MMHG | DIASTOLIC BLOOD PRESSURE: 83 MMHG

## 2021-01-01 VITALS
WEIGHT: 123 LBS | SYSTOLIC BLOOD PRESSURE: 164 MMHG | HEIGHT: 55 IN | BODY MASS INDEX: 28.46 KG/M2 | HEART RATE: 94 BPM | DIASTOLIC BLOOD PRESSURE: 84 MMHG

## 2021-01-01 VITALS — BODY MASS INDEX: 28 KG/M2 | WEIGHT: 121 LBS | HEIGHT: 55 IN

## 2021-01-01 VITALS — HEIGHT: 55 IN | WEIGHT: 120 LBS | BODY MASS INDEX: 27.77 KG/M2

## 2021-01-01 DIAGNOSIS — I10 ESSENTIAL HYPERTENSION: ICD-10-CM

## 2021-01-01 DIAGNOSIS — E87.6 DIURETIC-INDUCED HYPOKALEMIA: ICD-10-CM

## 2021-01-01 DIAGNOSIS — T50.2X5A DIURETIC-INDUCED HYPOKALEMIA: ICD-10-CM

## 2021-01-01 DIAGNOSIS — G89.29 OTHER CHRONIC PAIN: ICD-10-CM

## 2021-01-01 DIAGNOSIS — M17.12 PRIMARY OSTEOARTHRITIS OF LEFT KNEE: Primary | ICD-10-CM

## 2021-01-01 DIAGNOSIS — H90.3 SENSORINEURAL HEARING LOSS (SNHL) OF BOTH EARS: ICD-10-CM

## 2021-01-01 DIAGNOSIS — R80.1 PERSISTENT PROTEINURIA: ICD-10-CM

## 2021-01-01 DIAGNOSIS — M17.0 PRIMARY OSTEOARTHRITIS OF BOTH KNEES: ICD-10-CM

## 2021-01-01 DIAGNOSIS — E78.00 PURE HYPERCHOLESTEROLEMIA: ICD-10-CM

## 2021-01-01 DIAGNOSIS — N18.31 STAGE 3A CHRONIC KIDNEY DISEASE (HCC): ICD-10-CM

## 2021-01-01 DIAGNOSIS — G57.92 NEUROPATHY OF ANKLE, LEFT: Primary | ICD-10-CM

## 2021-01-01 DIAGNOSIS — F41.9 ANXIETY: ICD-10-CM

## 2021-01-01 DIAGNOSIS — E87.6 HYPOKALEMIA: ICD-10-CM

## 2021-01-01 DIAGNOSIS — M19.90 ARTHRITIS: Primary | ICD-10-CM
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
## 2021-01-01 DIAGNOSIS — S69.91XA RIGHT WRIST INJURY, INITIAL ENCOUNTER: ICD-10-CM

## 2021-01-01 DIAGNOSIS — M25.531 PAIN IN RIGHT WRIST: ICD-10-CM

## 2021-01-01 DIAGNOSIS — E87.5 HYPERKALEMIA: Primary | ICD-10-CM

## 2021-01-01 DIAGNOSIS — J01.40 ACUTE PANSINUSITIS, RECURRENCE NOT SPECIFIED: ICD-10-CM

## 2021-01-01 DIAGNOSIS — S80.02XA CONTUSION OF LEFT KNEE, INITIAL ENCOUNTER: ICD-10-CM

## 2021-01-01 DIAGNOSIS — S60.211A CONTUSION OF RIGHT WRIST, INITIAL ENCOUNTER: ICD-10-CM

## 2021-01-01 DIAGNOSIS — Z23 ENCOUNTER FOR IMMUNIZATION: Primary | ICD-10-CM

## 2021-01-01 DIAGNOSIS — S86.912A KNEE STRAIN, LEFT, INITIAL ENCOUNTER: ICD-10-CM
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
## 2021-01-01 DIAGNOSIS — M17.12 PRIMARY OSTEOARTHRITIS OF LEFT KNEE: ICD-10-CM

## 2021-01-01 DIAGNOSIS — I73.9 PVD (PERIPHERAL VASCULAR DISEASE) (HCC): ICD-10-CM

## 2021-01-01 LAB
ALBUMIN SERPL BCP-MCNC: 3.6 G/DL (ref 3.5–5)
ALBUMIN SERPL BCP-MCNC: 3.8 G/DL (ref 3.5–5)
ALP SERPL-CCNC: 117 U/L (ref 46–116)
ALP SERPL-CCNC: 117 U/L (ref 46–116)
ALT SERPL W P-5'-P-CCNC: 20 U/L (ref 12–78)
ALT SERPL W P-5'-P-CCNC: 25 U/L (ref 12–78)
ANION GAP SERPL CALCULATED.3IONS-SCNC: 5 MMOL/L (ref 4–13)
ANION GAP SERPL CALCULATED.3IONS-SCNC: 6 MMOL/L (ref 4–13)
ANION GAP SERPL CALCULATED.3IONS-SCNC: 8 MMOL/L (ref 4–13)
AST SERPL W P-5'-P-CCNC: 20 U/L (ref 5–45)
AST SERPL W P-5'-P-CCNC: 21 U/L (ref 5–45)
BACTERIA UR QL AUTO: NORMAL /HPF
BASOPHILS # BLD AUTO: 0.06 THOUSANDS/ΜL (ref 0–0.1)
BASOPHILS # BLD AUTO: 0.06 THOUSANDS/ΜL (ref 0–0.1)
BASOPHILS NFR BLD AUTO: 1 % (ref 0–1)
BASOPHILS NFR BLD AUTO: 1 % (ref 0–1)
BILIRUB SERPL-MCNC: 0.49 MG/DL (ref 0.2–1)
BILIRUB SERPL-MCNC: 0.77 MG/DL (ref 0.2–1)
BILIRUB UR QL STRIP: NEGATIVE
BUN SERPL-MCNC: 14 MG/DL (ref 5–25)
BUN SERPL-MCNC: 20 MG/DL (ref 5–25)
BUN SERPL-MCNC: 21 MG/DL (ref 5–25)
CALCIUM SERPL-MCNC: 9 MG/DL (ref 8.3–10.1)
CALCIUM SERPL-MCNC: 9.4 MG/DL (ref 8.3–10.1)
CALCIUM SERPL-MCNC: 9.7 MG/DL (ref 8.3–10.1)
CHLORIDE SERPL-SCNC: 103 MMOL/L (ref 100–108)
CHLORIDE SERPL-SCNC: 105 MMOL/L (ref 100–108)
CHLORIDE SERPL-SCNC: 96 MMOL/L (ref 100–108)
CHOLEST SERPL-MCNC: 241 MG/DL (ref 50–200)
CLARITY UR: CLEAR
CO2 SERPL-SCNC: 27 MMOL/L (ref 21–32)
CO2 SERPL-SCNC: 28 MMOL/L (ref 21–32)
CO2 SERPL-SCNC: 29 MMOL/L (ref 21–32)
COLOR UR: YELLOW
CREAT SERPL-MCNC: 0.67 MG/DL (ref 0.6–1.3)
CREAT SERPL-MCNC: 0.84 MG/DL (ref 0.6–1.3)
CREAT SERPL-MCNC: 0.85 MG/DL (ref 0.6–1.3)
CREAT UR-MCNC: 50.4 MG/DL
CREAT UR-MCNC: 50.4 MG/DL
CREAT UR-MCNC: 67 MG/DL
CREAT UR-MCNC: 67 MG/DL
EOSINOPHIL # BLD AUTO: 0.15 THOUSAND/ΜL (ref 0–0.61)
EOSINOPHIL # BLD AUTO: 0.39 THOUSAND/ΜL (ref 0–0.61)
EOSINOPHIL NFR BLD AUTO: 1 % (ref 0–6)
EOSINOPHIL NFR BLD AUTO: 5 % (ref 0–6)
ERYTHROCYTE [DISTWIDTH] IN BLOOD BY AUTOMATED COUNT: 12.4 % (ref 11.6–15.1)
ERYTHROCYTE [DISTWIDTH] IN BLOOD BY AUTOMATED COUNT: 12.8 % (ref 11.6–15.1)
GFR SERPL CREATININE-BSD FRML MDRD: 56 ML/MIN/1.73SQ M
GFR SERPL CREATININE-BSD FRML MDRD: 57 ML/MIN/1.73SQ M
GFR SERPL CREATININE-BSD FRML MDRD: 72 ML/MIN/1.73SQ M
GLUCOSE P FAST SERPL-MCNC: 85 MG/DL (ref 65–99)
GLUCOSE P FAST SERPL-MCNC: 86 MG/DL (ref 65–99)
GLUCOSE P FAST SERPL-MCNC: 91 MG/DL (ref 65–99)
GLUCOSE UR STRIP-MCNC: NEGATIVE MG/DL
HCT VFR BLD AUTO: 43.5 % (ref 34.8–46.1)
HCT VFR BLD AUTO: 46.5 % (ref 34.8–46.1)
HDLC SERPL-MCNC: 120 MG/DL
HGB BLD-MCNC: 14 G/DL (ref 11.5–15.4)
HGB BLD-MCNC: 15 G/DL (ref 11.5–15.4)
HGB UR QL STRIP.AUTO: NEGATIVE
HYALINE CASTS #/AREA URNS LPF: NORMAL /LPF
IMM GRANULOCYTES # BLD AUTO: 0.03 THOUSAND/UL (ref 0–0.2)
IMM GRANULOCYTES # BLD AUTO: 0.06 THOUSAND/UL (ref 0–0.2)
IMM GRANULOCYTES NFR BLD AUTO: 0 % (ref 0–2)
IMM GRANULOCYTES NFR BLD AUTO: 1 % (ref 0–2)
KETONES UR STRIP-MCNC: NEGATIVE MG/DL
LDLC SERPL CALC-MCNC: 100 MG/DL (ref 0–100)
LDLC SERPL DIRECT ASSAY-MCNC: 84 MG/DL (ref 0–100)
LEUKOCYTE ESTERASE UR QL STRIP: NEGATIVE
LYMPHOCYTES # BLD AUTO: 3.1 THOUSANDS/ΜL (ref 0.6–4.47)
LYMPHOCYTES # BLD AUTO: 4.06 THOUSANDS/ΜL (ref 0.6–4.47)
LYMPHOCYTES NFR BLD AUTO: 35 % (ref 14–44)
LYMPHOCYTES NFR BLD AUTO: 37 % (ref 14–44)
MAGNESIUM SERPL-MCNC: 2.1 MG/DL (ref 1.6–2.6)
MCH RBC QN AUTO: 30.1 PG (ref 26.8–34.3)
MCH RBC QN AUTO: 30.2 PG (ref 26.8–34.3)
MCHC RBC AUTO-ENTMCNC: 32.2 G/DL (ref 31.4–37.4)
MCHC RBC AUTO-ENTMCNC: 32.3 G/DL (ref 31.4–37.4)
MCV RBC AUTO: 94 FL (ref 82–98)
MCV RBC AUTO: 94 FL (ref 82–98)
MICROALBUMIN UR-MCNC: 13.4 MG/L (ref 0–20)
MICROALBUMIN UR-MCNC: 9.9 MG/L (ref 0–20)
MICROALBUMIN/CREAT 24H UR: 20 MG/G CREATININE (ref 0–30)
MICROALBUMIN/CREAT 24H UR: 20 MG/G CREATININE (ref 0–30)
MONOCYTES # BLD AUTO: 0.67 THOUSAND/ΜL (ref 0.17–1.22)
MONOCYTES # BLD AUTO: 0.86 THOUSAND/ΜL (ref 0.17–1.22)
MONOCYTES NFR BLD AUTO: 7 % (ref 4–12)
MONOCYTES NFR BLD AUTO: 8 % (ref 4–12)
NEUTROPHILS # BLD AUTO: 4.19 THOUSANDS/ΜL (ref 1.85–7.62)
NEUTROPHILS # BLD AUTO: 6.43 THOUSANDS/ΜL (ref 1.85–7.62)
NEUTS SEG NFR BLD AUTO: 49 % (ref 43–75)
NEUTS SEG NFR BLD AUTO: 55 % (ref 43–75)
NITRITE UR QL STRIP: NEGATIVE
NON-SQ EPI CELLS URNS QL MICRO: NORMAL /HPF
NONHDLC SERPL-MCNC: 121 MG/DL
NRBC BLD AUTO-RTO: 0 /100 WBCS
NRBC BLD AUTO-RTO: 0 /100 WBCS
PH UR STRIP.AUTO: 6.5 [PH]
PLATELET # BLD AUTO: 269 THOUSANDS/UL (ref 149–390)
PLATELET # BLD AUTO: 297 THOUSANDS/UL (ref 149–390)
PMV BLD AUTO: 9.8 FL (ref 8.9–12.7)
PMV BLD AUTO: 9.9 FL (ref 8.9–12.7)
POTASSIUM SERPL-SCNC: 3.4 MMOL/L (ref 3.5–5.3)
POTASSIUM SERPL-SCNC: 3.7 MMOL/L (ref 3.5–5.3)
POTASSIUM SERPL-SCNC: 3.8 MMOL/L (ref 3.5–5.3)
PROT SERPL-MCNC: 7.6 G/DL (ref 6.4–8.2)
PROT SERPL-MCNC: 7.8 G/DL (ref 6.4–8.2)
PROT UR STRIP-MCNC: NEGATIVE MG/DL
PROT UR-MCNC: 14 MG/DL
PROT UR-MCNC: 8 MG/DL
PROT/CREAT UR: 0.16 MG/G{CREAT} (ref 0–0.1)
PROT/CREAT UR: 0.21 MG/G{CREAT} (ref 0–0.1)
RBC # BLD AUTO: 4.65 MILLION/UL (ref 3.81–5.12)
RBC # BLD AUTO: 4.97 MILLION/UL (ref 3.81–5.12)
RBC #/AREA URNS AUTO: NORMAL /HPF
SODIUM SERPL-SCNC: 132 MMOL/L (ref 136–145)
SODIUM SERPL-SCNC: 136 MMOL/L (ref 136–145)
SODIUM SERPL-SCNC: 139 MMOL/L (ref 136–145)
SP GR UR STRIP.AUTO: 1.01 (ref 1–1.03)
TRIGL SERPL-MCNC: 104 MG/DL
URATE SERPL-MCNC: 6.1 MG/DL (ref 2–6.8)
UROBILINOGEN UR QL STRIP.AUTO: 1 E.U./DL
WBC # BLD AUTO: 11.62 THOUSAND/UL (ref 4.31–10.16)
WBC # BLD AUTO: 8.44 THOUSAND/UL (ref 4.31–10.16)
WBC #/AREA URNS AUTO: NORMAL /HPF

## 2021-01-01 PROCEDURE — 83721 ASSAY OF BLOOD LIPOPROTEIN: CPT

## 2021-01-01 PROCEDURE — 36415 COLL VENOUS BLD VENIPUNCTURE: CPT

## 2021-01-01 PROCEDURE — 73130 X-RAY EXAM OF HAND: CPT

## 2021-01-01 PROCEDURE — 84156 ASSAY OF PROTEIN URINE: CPT

## 2021-01-01 PROCEDURE — 82043 UR ALBUMIN QUANTITATIVE: CPT

## 2021-01-01 PROCEDURE — 99213 OFFICE O/P EST LOW 20 MIN: CPT | Performed by: NURSE PRACTITIONER

## 2021-01-01 PROCEDURE — 20610 DRAIN/INJ JOINT/BURSA W/O US: CPT | Performed by: ORTHOPAEDIC SURGERY

## 2021-01-01 PROCEDURE — 80061 LIPID PANEL: CPT

## 2021-01-01 PROCEDURE — 83735 ASSAY OF MAGNESIUM: CPT

## 2021-01-01 PROCEDURE — 99213 OFFICE O/P EST LOW 20 MIN: CPT | Performed by: ORTHOPAEDIC SURGERY

## 2021-01-01 PROCEDURE — 73564 X-RAY EXAM KNEE 4 OR MORE: CPT

## 2021-01-01 PROCEDURE — 91301 SARS-COV-2 / COVID-19 MRNA VACCINE (MODERNA) 100 MCG: CPT

## 2021-01-01 PROCEDURE — 80053 COMPREHEN METABOLIC PANEL: CPT

## 2021-01-01 PROCEDURE — 85025 COMPLETE CBC W/AUTO DIFF WBC: CPT

## 2021-01-01 PROCEDURE — 0012A SARS-COV-2 / COVID-19 MRNA VACCINE (MODERNA) 100 MCG: CPT

## 2021-01-01 PROCEDURE — 73110 X-RAY EXAM OF WRIST: CPT

## 2021-01-01 PROCEDURE — 81001 URINALYSIS AUTO W/SCOPE: CPT

## 2021-01-01 PROCEDURE — 99214 OFFICE O/P EST MOD 30 MIN: CPT | Performed by: INTERNAL MEDICINE

## 2021-01-01 PROCEDURE — G0463 HOSPITAL OUTPT CLINIC VISIT: HCPCS | Performed by: NURSE PRACTITIONER

## 2021-01-01 PROCEDURE — 99203 OFFICE O/P NEW LOW 30 MIN: CPT | Performed by: SURGERY

## 2021-01-01 PROCEDURE — 82570 ASSAY OF URINE CREATININE: CPT

## 2021-01-01 PROCEDURE — 80048 BASIC METABOLIC PNL TOTAL CA: CPT

## 2021-01-01 PROCEDURE — 84550 ASSAY OF BLOOD/URIC ACID: CPT

## 2021-01-01 RX ORDER — ALPRAZOLAM 0.25 MG/1
0.25 TABLET ORAL
Qty: 30 TABLET | Refills: 0 | Status: SHIPPED | OUTPATIENT
Start: 2021-01-01 | End: 2021-01-01 | Stop reason: SDUPTHER

## 2021-01-01 RX ORDER — ALPRAZOLAM 0.25 MG/1
0.25 TABLET ORAL
Qty: 30 TABLET | Refills: 1 | Status: SHIPPED | OUTPATIENT
Start: 2021-01-01 | End: 2021-01-01 | Stop reason: SDUPTHER

## 2021-01-01 RX ORDER — ALPRAZOLAM 0.25 MG/1
0.25 TABLET ORAL
Qty: 30 TABLET | Refills: 1 | Status: SHIPPED | OUTPATIENT
Start: 2021-01-01 | End: 2022-01-01 | Stop reason: SDUPTHER

## 2021-01-01 RX ORDER — METHYLPREDNISOLONE ACETATE 40 MG/ML
2 INJECTION, SUSPENSION INTRA-ARTICULAR; INTRALESIONAL; INTRAMUSCULAR; SOFT TISSUE
Status: DISCONTINUED | OUTPATIENT
Start: 2021-01-01 | End: 2022-01-01 | Stop reason: HOSPADM

## 2021-01-01 RX ORDER — PRAVASTATIN SODIUM 20 MG
20 TABLET ORAL DAILY
Qty: 90 TABLET | Refills: 3 | Status: SHIPPED | OUTPATIENT
Start: 2021-01-01 | End: 2022-01-01 | Stop reason: HOSPADM

## 2021-01-01 RX ORDER — OXYCODONE HYDROCHLORIDE AND ACETAMINOPHEN 5; 325 MG/1; MG/1
1 TABLET ORAL 2 TIMES DAILY PRN
Qty: 30 TABLET | Refills: 0 | Status: SHIPPED | OUTPATIENT
Start: 2021-01-01 | End: 2021-01-01 | Stop reason: SDUPTHER

## 2021-01-01 RX ORDER — METHYLPREDNISOLONE ACETATE 40 MG/ML
2 INJECTION, SUSPENSION INTRA-ARTICULAR; INTRALESIONAL; INTRAMUSCULAR; SOFT TISSUE
Status: COMPLETED | OUTPATIENT
Start: 2021-01-01 | End: 2021-01-01

## 2021-01-01 RX ORDER — BUPIVACAINE HYDROCHLORIDE 2.5 MG/ML
4 INJECTION, SOLUTION INFILTRATION; PERINEURAL
Status: COMPLETED | OUTPATIENT
Start: 2021-01-01 | End: 2021-01-01

## 2021-01-01 RX ORDER — FEXOFENADINE HCL 180 MG/1
180 TABLET ORAL DAILY
COMMUNITY
End: 2022-01-01 | Stop reason: HOSPADM

## 2021-01-01 RX ORDER — POTASSIUM CHLORIDE 750 MG/1
10 TABLET, EXTENDED RELEASE ORAL DAILY
Qty: 90 TABLET | Refills: 2 | Status: SHIPPED | OUTPATIENT
Start: 2021-01-01 | End: 2022-01-01 | Stop reason: HOSPADM

## 2021-01-01 RX ORDER — FLUTICASONE PROPIONATE 50 MCG
2 SPRAY, SUSPENSION (ML) NASAL DAILY
Qty: 1 G | Refills: 3 | Status: SHIPPED | OUTPATIENT
Start: 2021-01-01 | End: 2022-01-01 | Stop reason: SDUPTHER

## 2021-01-01 RX ORDER — POTASSIUM CHLORIDE 20 MEQ/1
20 TABLET, EXTENDED RELEASE ORAL DAILY
Qty: 30 TABLET | Refills: 6 | Status: SHIPPED | OUTPATIENT
Start: 2021-01-01 | End: 2021-01-01

## 2021-01-01 RX ORDER — HYDROCHLOROTHIAZIDE 12.5 MG/1
12.5 CAPSULE, GELATIN COATED ORAL EVERY MORNING
Qty: 30 CAPSULE | Refills: 2 | Status: SHIPPED | OUTPATIENT
Start: 2021-01-01 | End: 2021-01-01 | Stop reason: SINTOL

## 2021-01-01 RX ORDER — OXYCODONE HYDROCHLORIDE AND ACETAMINOPHEN 5; 325 MG/1; MG/1
1 TABLET ORAL 2 TIMES DAILY PRN
Qty: 30 TABLET | Refills: 0 | Status: SHIPPED | OUTPATIENT
Start: 2021-01-01

## 2021-01-01 RX ORDER — BUPIVACAINE HYDROCHLORIDE 2.5 MG/ML
4 INJECTION, SOLUTION INFILTRATION; PERINEURAL
Status: DISCONTINUED | OUTPATIENT
Start: 2021-01-01 | End: 2022-01-01 | Stop reason: HOSPADM

## 2021-01-01 RX ORDER — ASPIRIN 81 MG/1
81 TABLET, CHEWABLE ORAL DAILY
COMMUNITY
End: 2022-01-01 | Stop reason: HOSPADM

## 2021-01-01 RX ADMIN — BUPIVACAINE HYDROCHLORIDE 4 ML: 2.5 INJECTION, SOLUTION INFILTRATION; PERINEURAL at 11:39

## 2021-01-01 RX ADMIN — METHYLPREDNISOLONE ACETATE 2 ML: 40 INJECTION, SUSPENSION INTRA-ARTICULAR; INTRALESIONAL; INTRAMUSCULAR; SOFT TISSUE at 11:50

## 2021-01-01 RX ADMIN — METHYLPREDNISOLONE ACETATE 2 ML: 40 INJECTION, SUSPENSION INTRA-ARTICULAR; INTRALESIONAL; INTRAMUSCULAR; SOFT TISSUE at 14:15

## 2021-01-01 RX ADMIN — BUPIVACAINE HYDROCHLORIDE 4 ML: 2.5 INJECTION, SOLUTION INFILTRATION; PERINEURAL at 11:50

## 2021-01-01 RX ADMIN — BUPIVACAINE HYDROCHLORIDE 4 ML: 2.5 INJECTION, SOLUTION INFILTRATION; PERINEURAL at 14:15

## 2021-01-01 RX ADMIN — METHYLPREDNISOLONE ACETATE 2 ML: 40 INJECTION, SUSPENSION INTRA-ARTICULAR; INTRALESIONAL; INTRAMUSCULAR; SOFT TISSUE at 11:39

## 2021-01-04 ENCOUNTER — OFFICE VISIT (OUTPATIENT)
Dept: OBGYN CLINIC | Facility: CLINIC | Age: 86
End: 2021-01-04
Payer: MEDICARE

## 2021-01-04 VITALS
DIASTOLIC BLOOD PRESSURE: 85 MMHG | HEART RATE: 74 BPM | HEIGHT: 55 IN | WEIGHT: 118 LBS | SYSTOLIC BLOOD PRESSURE: 176 MMHG | BODY MASS INDEX: 27.31 KG/M2

## 2021-01-04 DIAGNOSIS — M79.671 PAIN IN RIGHT FOOT: Primary | ICD-10-CM

## 2021-01-04 DIAGNOSIS — M17.11 PRIMARY OSTEOARTHRITIS OF RIGHT KNEE: ICD-10-CM

## 2021-01-04 PROCEDURE — 99213 OFFICE O/P EST LOW 20 MIN: CPT | Performed by: ORTHOPAEDIC SURGERY

## 2021-01-04 PROCEDURE — 20610 DRAIN/INJ JOINT/BURSA W/O US: CPT | Performed by: ORTHOPAEDIC SURGERY

## 2021-01-04 RX ORDER — BUPIVACAINE HYDROCHLORIDE 2.5 MG/ML
4 INJECTION, SOLUTION INFILTRATION; PERINEURAL
Status: COMPLETED | OUTPATIENT
Start: 2021-01-04 | End: 2021-01-04

## 2021-01-04 RX ORDER — METHYLPREDNISOLONE ACETATE 40 MG/ML
2 INJECTION, SUSPENSION INTRA-ARTICULAR; INTRALESIONAL; INTRAMUSCULAR; SOFT TISSUE
Status: COMPLETED | OUTPATIENT
Start: 2021-01-04 | End: 2021-01-04

## 2021-01-04 RX ADMIN — BUPIVACAINE HYDROCHLORIDE 4 ML: 2.5 INJECTION, SOLUTION INFILTRATION; PERINEURAL at 14:20

## 2021-01-04 RX ADMIN — METHYLPREDNISOLONE ACETATE 2 ML: 40 INJECTION, SUSPENSION INTRA-ARTICULAR; INTRALESIONAL; INTRAMUSCULAR; SOFT TISSUE at 14:20

## 2021-01-04 NOTE — PROGRESS NOTES
Assessment/Plan:    No problem-specific Assessment & Plan notes found for this encounter  Diagnoses and all orders for this visit:    Pain in right foot  -     XR foot 3+ vw right; Future  -     Ankle Cude ankle/Ankle Brace    Primary osteoarthritis of right knee        The right knee was injected with Depo-Medrol and Marcaine  She tolerated procedure quite well  She was placed in an ankle brace for the right side  She states at times there is instability present  Return back in 3 months for strength and motion check      Subjective:      Patient ID: Dayne Birmingham is a 80 y o  female  HPI     The patient is complaining of pain along her right knee and right ankle for the past several months  As far as her right ankle, she states she may have inverted it a while back  She denies hearing a pop  She denies any change of color swelling  She denies any numbness or tingling  As far as her right knee, she states this feels like arthritis like she has in the office at side  She ambulates with a rolling walker very briskly  She has not had any x-rays    The following portions of the patient's history were reviewed and updated as appropriate: allergies, current medications, past family history, past medical history, past social history, past surgical history and problem list     Review of Systems   Constitutional: Negative for chills, fever and unexpected weight change  HENT: Negative for hearing loss, nosebleeds and sore throat  Eyes: Negative for pain, redness and visual disturbance  Respiratory: Negative for cough, shortness of breath and wheezing  Cardiovascular: Negative for chest pain, palpitations and leg swelling  Gastrointestinal: Negative for abdominal pain, nausea and vomiting  Endocrine: Negative for polydipsia and polyuria  Genitourinary: Negative for dysuria and hematuria  Musculoskeletal: Positive for arthralgias and gait problem   Negative for back pain, joint swelling, myalgias, neck pain and neck stiffness  As noted in HPI   Skin: Negative for rash and wound  Neurological: Negative for dizziness, numbness and headaches  Psychiatric/Behavioral: Negative for decreased concentration and suicidal ideas  The patient is not nervous/anxious  Objective:      BP (!) 176/85 (BP Location: Left arm, Patient Position: Sitting, Cuff Size: Large)   Pulse 74   Ht 4' 6" (1 372 m)   Wt 53 5 kg (118 lb)   BMI 28 45 kg/m²          Physical Exam        Right lower extremity is neurovascular intact  Toes are pink and mobile  Compartments are soft  No obvious trauma is present along her right lower extremity  As far as her right knee, range of motion is from 5-110 degrees  There is a negative Lachman's, drawer, pivot shift test   There is mild medial and lateral joint tenderness patellofemoral crepitation  Ankle had diffuse tenderness along the lateral side  No gross instability  No syndesmotic pain  No talar dome pain  No medial side pain  X-rays of the right foot displays no fractures or dislocations    Large joint arthrocentesis: R knee  Universal Protocol:  Consent: Verbal consent obtained  Risks and benefits: risks, benefits and alternatives were discussed  Consent given by: patient  Time out: Immediately prior to procedure a "time out" was called to verify the correct patient, procedure, equipment, support staff and site/side marked as required    Patient understanding: patient states understanding of the procedure being performed  Relevant documents: relevant documents present and verified  Test results: test results available and properly labeled  Site marked: the operative site was marked  Patient identity confirmed: verbally with patient    Procedure Details  Location: knee - R knee  Preparation: Patient was prepped and draped in the usual sterile fashion  Needle size: 22 G  Ultrasound guidance: no  Approach: lateral  Medications administered: 4 mL bupivacaine 0 25 %; 2 mL methylPREDNISolone acetate 40 mg/mL    Patient tolerance: patient tolerated the procedure well with no immediate complications  Dressing:  Sterile dressing applied

## 2021-01-19 DIAGNOSIS — E87.6 DIURETIC-INDUCED HYPOKALEMIA: ICD-10-CM

## 2021-01-19 DIAGNOSIS — I10 ESSENTIAL HYPERTENSION: ICD-10-CM

## 2021-01-19 DIAGNOSIS — H90.3 SENSORINEURAL HEARING LOSS (SNHL) OF BOTH EARS: ICD-10-CM

## 2021-01-19 DIAGNOSIS — F41.9 ANXIETY: ICD-10-CM

## 2021-01-19 DIAGNOSIS — T50.2X5A DIURETIC-INDUCED HYPOKALEMIA: ICD-10-CM

## 2021-01-19 DIAGNOSIS — K21.9 GASTROESOPHAGEAL REFLUX DISEASE WITHOUT ESOPHAGITIS: ICD-10-CM

## 2021-01-19 RX ORDER — HYDROCHLOROTHIAZIDE 12.5 MG/1
12.5 CAPSULE, GELATIN COATED ORAL EVERY MORNING
Qty: 30 CAPSULE | Refills: 2 | Status: SHIPPED | OUTPATIENT
Start: 2021-01-19 | End: 2021-01-01 | Stop reason: SDUPTHER

## 2021-01-19 RX ORDER — POTASSIUM CHLORIDE 20 MEQ/1
20 TABLET, EXTENDED RELEASE ORAL DAILY
Qty: 30 TABLET | Refills: 6 | Status: SHIPPED | OUTPATIENT
Start: 2021-01-19 | End: 2021-01-01 | Stop reason: SDUPTHER

## 2021-01-19 RX ORDER — ALPRAZOLAM 0.25 MG/1
0.25 TABLET ORAL
Qty: 30 TABLET | Refills: 0 | Status: SHIPPED | OUTPATIENT
Start: 2021-01-19 | End: 2021-02-15 | Stop reason: SDUPTHER

## 2021-01-29 ENCOUNTER — TELEPHONE (OUTPATIENT)
Dept: NEPHROLOGY | Facility: CLINIC | Age: 86
End: 2021-01-29

## 2021-01-29 DIAGNOSIS — J32.9 SINUSITIS, UNSPECIFIED CHRONICITY, UNSPECIFIED LOCATION: Primary | ICD-10-CM

## 2021-01-29 RX ORDER — CEPHALEXIN 500 MG/1
500 CAPSULE ORAL EVERY 12 HOURS SCHEDULED
Qty: 14 CAPSULE | Refills: 0 | Status: SHIPPED | OUTPATIENT
Start: 2021-01-29 | End: 2021-02-05

## 2021-01-29 RX ORDER — AZITHROMYCIN 250 MG/1
TABLET, FILM COATED ORAL
Qty: 6 TABLET | Refills: 0 | Status: SHIPPED | OUTPATIENT
Start: 2021-01-29 | End: 2021-02-02

## 2021-01-29 NOTE — TELEPHONE ENCOUNTER
Left message that medication was sent in and if she has any further questions to give the office a call back

## 2021-01-29 NOTE — TELEPHONE ENCOUNTER
Pharmacy called said patients chart at pharmacy states she has allergy to cephalosporins  You sent Cephalexin, do you want to call in something different?

## 2021-01-29 NOTE — TELEPHONE ENCOUNTER
Patient's daughter called stating that patient is having sinus and ear issues  She is asking if you can send something in for her to [facility]?

## 2021-02-08 ENCOUNTER — OFFICE VISIT (OUTPATIENT)
Dept: OBGYN CLINIC | Facility: CLINIC | Age: 86
End: 2021-02-08
Payer: MEDICARE

## 2021-02-08 VITALS
HEART RATE: 121 BPM | DIASTOLIC BLOOD PRESSURE: 90 MMHG | BODY MASS INDEX: 27.31 KG/M2 | HEIGHT: 55 IN | WEIGHT: 118 LBS | SYSTOLIC BLOOD PRESSURE: 155 MMHG

## 2021-02-08 DIAGNOSIS — M17.12 PRIMARY OSTEOARTHRITIS OF LEFT KNEE: Primary | ICD-10-CM

## 2021-02-08 PROCEDURE — 20610 DRAIN/INJ JOINT/BURSA W/O US: CPT | Performed by: PHYSICIAN ASSISTANT

## 2021-02-08 PROCEDURE — 99213 OFFICE O/P EST LOW 20 MIN: CPT | Performed by: PHYSICIAN ASSISTANT

## 2021-02-08 RX ORDER — BUPIVACAINE HYDROCHLORIDE 2.5 MG/ML
4 INJECTION, SOLUTION INFILTRATION; PERINEURAL
Status: COMPLETED | OUTPATIENT
Start: 2021-02-08 | End: 2021-02-08

## 2021-02-08 RX ORDER — METHYLPREDNISOLONE ACETATE 40 MG/ML
2 INJECTION, SUSPENSION INTRA-ARTICULAR; INTRALESIONAL; INTRAMUSCULAR; SOFT TISSUE
Status: COMPLETED | OUTPATIENT
Start: 2021-02-08 | End: 2021-02-08

## 2021-02-08 RX ADMIN — METHYLPREDNISOLONE ACETATE 2 ML: 40 INJECTION, SUSPENSION INTRA-ARTICULAR; INTRALESIONAL; INTRAMUSCULAR; SOFT TISSUE at 13:56

## 2021-02-08 RX ADMIN — BUPIVACAINE HYDROCHLORIDE 4 ML: 2.5 INJECTION, SOLUTION INFILTRATION; PERINEURAL at 13:56

## 2021-02-08 NOTE — PROGRESS NOTES
ASSESSMENT/PLAN:    Diagnoses and all orders for this visit:    Primary osteoarthritis of left knee    Other orders  -     Large joint arthrocentesis        The patient's left knee was injected with Depo-Medrol and Marcaine  She tolerated the injection quite well  She will follow up with our office in 2 months  The patient is acceptable to this plan  Return in about 2 months (around 4/8/2021)  _____________________________________________________  CHIEF COMPLAINT:  No chief complaint on file  SUBJECTIVE:  Ponce Russell is a 80 y o  female   With a known history of primary osteoarthritis of her left knee  She presents today for quarterly corticosteroid injections  She would like her left knee injected  She still complaining of left knee pain  She denies any numbness or tingling  She denies any fever or chills  She denies any new falls or trauma      The following portions of the patient's history were reviewed and updated as appropriate: allergies, current medications, past family history, past medical history, past social history, past surgical history and problem list     PAST MEDICAL HISTORY:  Past Medical History:   Diagnosis Date    Benign essential hypertension     Cancer ([facility] [address] )     rectal and sigmoid colon     Carcinoma in situ     Carotid artery occlusion     Disorder of rectum and anus     Disorder of skin     Diverticular disease of colon     Edema     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     Essential hypertension     Gastroesophageal reflux disease     Herpes zoster without complication     Hyperlipidemia     Hypokalemia     Idiopathic peripheral neuropathy     Iron deficiency anemia     Localized, primary osteoarthritis     Lower leg    Malaise and fatigue     Mixed hyperlipidemia     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     Post-herpetic polyneuropathy     Unspecified osteoarthritis, unspecified site     Vitamin D deficiency        PAST SURGICAL HISTORY:  Past Surgical History:   Procedure Laterality Date    COLON SURGERY      Colon resection with open surgery     COLONOSCOPY  2013    Positive for tumor     HYSTERECTOMY      OTHER SURGICAL HISTORY      fibrous tumor    RECTAL SURGERY  09/2013    tumor       FAMILY HISTORY:  Family History   Problem Relation Age of Onset    Hypertension Father     Heart failure Father        SOCIAL HISTORY:  Social History     Tobacco Use    Smoking status: Never Smoker    Smokeless tobacco: Never Used   Substance Use Topics    Alcohol use: Not Currently     Comment: Denies alcohol use - As per [facility] Drug use: Never     Comment: Denies drug use - As per [facility]        MEDICATIONS:    Current Outpatient Medications:     ALPRAZolam (XANAX) 0 25 mg tablet, Take 1 tablet (0 25 mg total) by mouth daily at bedtime as needed for anxiety, Disp: 30 tablet, Rfl: 0    diclofenac sodium (VOLTAREN) 1 %, APPLY 2 GRAMS TOPICALLY 4 TIMES A DAY, Disp: 100 g, Rfl: 0    gabapentin (NEURONTIN) 800 mg tablet, Take by mouth, Disp: , Rfl:     hydrochlorothiazide (MICROZIDE) 12 5 mg capsule, Take 1 capsule (12 5 mg total) by mouth every morning, Disp: 30 capsule, Rfl: 2    naloxone (NARCAN) 4 mg/0 1 mL nasal spray, Administer 1 spray into a nostril   If breathing does not return to normal or if breathing difficulty resumes after 2-3 minutes, give another dose in the other nostril using a new spray , Disp: 1 each, Rfl: 1    omeprazole (PriLOSEC) 20 mg delayed release capsule, Take 1 capsule (20 mg total) by mouth daily, Disp: 30 capsule, Rfl: 4    oxyCODONE-acetaminophen (PERCOCET) 5-325 mg per tablet, Take 1 tablet by mouth 2 (two) times a day as needed for moderate painMax Daily Amount: 2 tablets, Disp: 30 tablet, Rfl: 0    potassium chloride (K-DUR,KLOR-CON) 20 mEq tablet, Take 1 tablet (20 mEq total) by mouth daily, Disp: 30 tablet, Rfl: 6    pravastatin (PRAVACHOL) 20 mg tablet, Take 1 tablet (20 mg total) by mouth daily, Disp: 30 tablet, Rfl: 6    verapamil (CALAN-SR) 180 mg CR tablet, Take 1 tablet (180 mg total) by mouth daily at bedtime, Disp: 30 tablet, Rfl: 6    fluticasone (FLONASE) 50 mcg/act nasal spray, 2 sprays into each nostril daily, Disp: 1 Bottle, Rfl: 1    ALLERGIES:  Allergies   Allergen Reactions    Cephalosporins Other (See Comments)     Per pharmacy    Penicillins Other (See Comments)     Per pharmacy       ROS:  Review of Systems     Constitutional: Negative for fatigue, fever or loss of appetite  HENT: Negative  Respiratory: Negative for shortness of breath, dyspnea  Cardiovascular: Negative for chest pain/tightness  Gastrointestinal: Negative for abdominal pain, N/V  Endocrine: Negative for cold/heat intolerance, unexplained weight loss/gain  Genitourinary: Negative for flank pain, dysuria, hematuria  Musculoskeletal: Positive for arthralgia   Skin: Negative for rash  Neurological: Negative for numbness or tingling  Psychiatric/Behavioral: Negative for agitation  _____________________________________________________  PHYSICAL EXAMINATION:    Blood pressure 155/90, pulse (!) 121, height 4' 6" (1 372 m), weight 53 5 kg (118 lb)  Constitutional: Oriented to person, place, and time  Appears well-developed and well-nourished  No distress  HENT:   Head: Normocephalic  Eyes: Conjunctivae are normal  Right eye exhibits no discharge  Left eye exhibits no discharge  No scleral icterus  Cardiovascular: Normal rate  Pulmonary/Chest: Effort normal    Neurological: Alert and oriented to person, place, and time  Skin: Skin is warm and dry  No rash noted  Not diaphoretic  No erythema  No pallor  Psychiatric: Normal mood and affect   Behavior is normal  Judgment and thought content normal       MUSCULOSKELETAL EXAMINATION:   Physical Exam  Ortho Exam    Right lower extremity is neurovascularly intact  Toes are pink and mobile   Compartments are soft  No warmth, erythema or ecchymosis  ROM of knee is from 5-115 degrees  Negative Lachman, drawer or pivot shift  No medial instability  Medial joint line tenderness, slight lateral joint line tenderness  Patellofemoral crepitation  Objective:  BP Readings from Last 1 Encounters:   02/08/21 155/90      Wt Readings from Last 1 Encounters:   02/08/21 53 5 kg (118 lb)        BMI:   Estimated body mass index is 28 45 kg/m² as calculated from the following:    Height as of this encounter: 4' 6" (1 372 m)  Weight as of this encounter: 53 5 kg (118 lb)  PROCEDURES PERFORMED:  Large joint arthrocentesis: R knee  Universal Protocol:  Consent given by: patient  Time out: Immediately prior to procedure a "time out" was called to verify the correct patient, procedure, equipment, support staff and site/side marked as required    Site marked: the operative site was marked  Supporting Documentation  Indications: pain   Procedure Details  Location: knee - R knee  Preparation: Patient was prepped and draped in the usual sterile fashion  Needle size: 22 G  Ultrasound guidance: no  Approach: lateral  Medications administered: 2 mL methylPREDNISolone acetate 40 mg/mL; 4 mL bupivacaine 0 25 %    Patient tolerance: patient tolerated the procedure well with no immediate complications  Dressing:  Sterile dressing applied            Julissa Rey PA-C

## 2021-02-15 DIAGNOSIS — E87.6 DIURETIC-INDUCED HYPOKALEMIA: ICD-10-CM

## 2021-02-15 DIAGNOSIS — E78.00 PURE HYPERCHOLESTEROLEMIA: ICD-10-CM

## 2021-02-15 DIAGNOSIS — T50.2X5A DIURETIC-INDUCED HYPOKALEMIA: ICD-10-CM

## 2021-02-15 DIAGNOSIS — I10 ESSENTIAL HYPERTENSION: ICD-10-CM

## 2021-02-15 DIAGNOSIS — H90.3 SENSORINEURAL HEARING LOSS (SNHL) OF BOTH EARS: ICD-10-CM

## 2021-02-15 DIAGNOSIS — F41.9 ANXIETY: ICD-10-CM

## 2021-02-16 RX ORDER — PRAVASTATIN SODIUM 20 MG
20 TABLET ORAL DAILY
Qty: 90 TABLET | Refills: 3 | Status: SHIPPED | OUTPATIENT
Start: 2021-02-16 | End: 2021-01-01 | Stop reason: SDUPTHER

## 2021-02-16 RX ORDER — ALPRAZOLAM 0.25 MG/1
0.25 TABLET ORAL
Qty: 30 TABLET | Refills: 0 | Status: SHIPPED | OUTPATIENT
Start: 2021-02-16 | End: 2021-03-25 | Stop reason: SDUPTHER

## 2021-03-25 ENCOUNTER — OFFICE VISIT (OUTPATIENT)
Dept: NEPHROLOGY | Facility: CLINIC | Age: 86
End: 2021-03-25
Payer: MEDICARE

## 2021-03-25 VITALS
SYSTOLIC BLOOD PRESSURE: 162 MMHG | HEIGHT: 55 IN | DIASTOLIC BLOOD PRESSURE: 74 MMHG | BODY MASS INDEX: 28 KG/M2 | OXYGEN SATURATION: 98 % | HEART RATE: 82 BPM | WEIGHT: 121 LBS

## 2021-03-25 DIAGNOSIS — E78.00 PURE HYPERCHOLESTEROLEMIA: ICD-10-CM

## 2021-03-25 DIAGNOSIS — F41.9 ANXIETY: ICD-10-CM

## 2021-03-25 DIAGNOSIS — E87.6 DIURETIC-INDUCED HYPOKALEMIA: ICD-10-CM

## 2021-03-25 DIAGNOSIS — H90.3 SENSORINEURAL HEARING LOSS (SNHL) OF BOTH EARS: ICD-10-CM

## 2021-03-25 DIAGNOSIS — K21.9 GASTROESOPHAGEAL REFLUX DISEASE WITHOUT ESOPHAGITIS: ICD-10-CM

## 2021-03-25 DIAGNOSIS — M17.0 PRIMARY OSTEOARTHRITIS OF BOTH KNEES: ICD-10-CM

## 2021-03-25 DIAGNOSIS — T50.2X5A DIURETIC-INDUCED HYPOKALEMIA: ICD-10-CM

## 2021-03-25 DIAGNOSIS — H66.90 ACUTE OTITIS MEDIA, UNSPECIFIED OTITIS MEDIA TYPE: ICD-10-CM

## 2021-03-25 DIAGNOSIS — I10 ESSENTIAL HYPERTENSION: ICD-10-CM

## 2021-03-25 DIAGNOSIS — R80.1 PERSISTENT PROTEINURIA: Primary | ICD-10-CM

## 2021-03-25 DIAGNOSIS — I73.9 PVD (PERIPHERAL VASCULAR DISEASE) (HCC): ICD-10-CM

## 2021-03-25 PROCEDURE — 99215 OFFICE O/P EST HI 40 MIN: CPT | Performed by: INTERNAL MEDICINE

## 2021-03-25 RX ORDER — OMEPRAZOLE 20 MG/1
20 CAPSULE, DELAYED RELEASE ORAL DAILY
Qty: 30 CAPSULE | Refills: 4 | Status: SHIPPED | OUTPATIENT
Start: 2021-03-25 | End: 2022-01-01 | Stop reason: HOSPADM

## 2021-03-25 RX ORDER — AMOXICILLIN AND CLAVULANATE POTASSIUM 250; 125 MG/1; MG/1
1 TABLET, FILM COATED ORAL EVERY 8 HOURS SCHEDULED
Qty: 21 TABLET | Refills: 0 | Status: SHIPPED | OUTPATIENT
Start: 2021-03-25 | End: 2021-01-01

## 2021-03-25 RX ORDER — ALPRAZOLAM 0.25 MG/1
0.25 TABLET ORAL
Qty: 30 TABLET | Refills: 0 | Status: SHIPPED | OUTPATIENT
Start: 2021-03-25 | End: 2021-01-01 | Stop reason: SDUPTHER

## 2021-03-25 NOTE — PROGRESS NOTES
[address] [facility] [place], [place]    Name: Isaiah Mena  YOB: 1921      Assessment/Plan:     Her left foot has a good pedal pulse but discomfort and erythema and mild edema  Problem List Items Addressed This Visit        Cardiovascular and Mediastinum    Essential hypertension     Adequately controlled for her age            Musculoskeletal and Integument    Primary osteoarthritis of both knees       Other    Pure hypercholesterolemia     Continue pravastatin         Persistent proteinuria - Primary     Check labs           Other Visit Diagnoses     Acute otitis media, unspecified otitis media type        Relevant Medications    amoxicillin-clavulanate (AUGMENTIN) 250-125 mg per tablet            Subjective:      Patient ID: Isaiah Mena is a 80 y o  female  HPI  Lopez Joy will be 8 years old next week!! She has a history of hypertension and dyslipidemia  She has mild microalbuminuria with a MAC of 37 mg/g which is stable      The following portions of the patient's history were reviewed and updated as appropriate: allergies, current medications, past family history, past medical history, past social history, past surgical history and problem list     Review of Systems   Constitutional: Negative for activity change, appetite change and fatigue  HENT: Positive for ear pain and hearing loss  Respiratory: Negative for cough and shortness of breath  Cardiovascular: Negative for chest pain, palpitations and leg swelling  Gastrointestinal: Negative for abdominal pain, blood in stool, constipation, diarrhea and nausea  Genitourinary: Negative for difficulty urinating, dysuria, hematuria and urgency  Musculoskeletal: Positive for arthralgias and gait problem  Neurological: Negative for dizziness and weakness  Social History     Socioeconomic History    Marital status:       Spouse name: None    Number of children: None    Years of education: None    Highest education level: None   Occupational History    Occupation: Retired    Social Needs    Financial resource strain: None    Food insecurity     Worry: None     Inability: None    Transportation needs     Medical: None     Non-medical: None   Tobacco Use    Smoking status: Never Smoker    Smokeless tobacco: Never Used   Substance and Sexual Activity    Alcohol use: Not Currently     Comment: Denies alcohol use - As per [id number] Drug use: Never     Comment: Denies drug use - As per [facility]     Sexual activity: None   Lifestyle    Physical activity     Days per week: None     Minutes per session: None    Stress: None   Relationships    Social connections     Talks on phone: None     Gets together: None     Attends [employer] service: None     Active member of club or organization: None     Attends meetings of clubs or organizations: None     Relationship status: None    Intimate partner violence     Fear of current or ex partner: None     Emotionally abused: None     Physically abused: None     Forced sexual activity: None   Other Topics Concern    None   Social History Narrative    None     Past Medical History:   Diagnosis Date    Benign essential hypertension     Cancer ([facility] [address] )     rectal and sigmoid colon     Carcinoma in situ     Carotid artery occlusion     Disorder of rectum and anus     Disorder of skin     Diverticular disease of colon     Edema     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     Essential hypertension     Gastroesophageal reflux disease     Herpes zoster without complication     Hyperlipidemia     Hypokalemia     Idiopathic peripheral neuropathy     Iron deficiency anemia     Localized, primary osteoarthritis     Lower leg    Malaise and fatigue     Mixed hyperlipidemia     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     Post-herpetic polyneuropathy     Unspecified osteoarthritis, unspecified site  Vitamin D deficiency      Past Surgical History:   Procedure Laterality Date    COLON SURGERY      Colon resection with open surgery     COLONOSCOPY  2013    Positive for tumor     HYSTERECTOMY      OTHER SURGICAL HISTORY      fibrous tumor    RECTAL SURGERY  09/2013    tumor       Current Outpatient Medications:     ALPRAZolam (XANAX) 0 25 mg tablet, Take 1 tablet (0 25 mg total) by mouth daily at bedtime as needed for anxiety, Disp: 30 tablet, Rfl: 0    diclofenac sodium (VOLTAREN) 1 %, APPLY 2 GRAMS TOPICALLY 4 TIMES A DAY, Disp: 100 g, Rfl: 0    fluticasone (FLONASE) 50 mcg/act nasal spray, 2 sprays into each nostril daily, Disp: 1 Bottle, Rfl: 1    hydrochlorothiazide (MICROZIDE) 12 5 mg capsule, Take 1 capsule (12 5 mg total) by mouth every morning, Disp: 30 capsule, Rfl: 2    naloxone (NARCAN) 4 mg/0 1 mL nasal spray, Administer 1 spray into a nostril   If breathing does not return to normal or if breathing difficulty resumes after 2-3 minutes, give another dose in the other nostril using a new spray , Disp: 1 each, Rfl: 1    omeprazole (PriLOSEC) 20 mg delayed release capsule, Take 1 capsule (20 mg total) by mouth daily, Disp: 30 capsule, Rfl: 4    oxyCODONE-acetaminophen (PERCOCET) 5-325 mg per tablet, Take 1 tablet by mouth 2 (two) times a day as needed for moderate painMax Daily Amount: 2 tablets, Disp: 30 tablet, Rfl: 0    potassium chloride (K-DUR,KLOR-CON) 20 mEq tablet, Take 1 tablet (20 mEq total) by mouth daily, Disp: 30 tablet, Rfl: 6    pravastatin (PRAVACHOL) 20 mg tablet, Take 1 tablet (20 mg total) by mouth daily, Disp: 90 tablet, Rfl: 3    verapamil (CALAN-SR) 180 mg CR tablet, Take 1 tablet (180 mg total) by mouth daily at bedtime, Disp: 30 tablet, Rfl: 6    amoxicillin-clavulanate (AUGMENTIN) 250-125 mg per tablet, Take 1 tablet by mouth every 8 (eight) hours for 7 days, Disp: 21 tablet, Rfl: 0    gabapentin (NEURONTIN) 800 mg tablet, Take by mouth, Disp: , Rfl:     Lab Results   Component Value Date     04/10/2018    SODIUM 136 09/16/2020    K 3 8 09/16/2020    CL 99 (L) 09/16/2020    CO2 27 09/16/2020    ANIONGAP 11 3 04/10/2018    AGAP 10 09/16/2020    BUN 18 09/16/2020    CREATININE 0 61 09/16/2020    GLUF 83 09/16/2020    CALCIUM 9 7 09/16/2020    AST 25 09/16/2020    ALT 24 09/16/2020    ALKPHOS 101 09/16/2020    PROT 6 9 04/10/2018    TP 7 3 09/16/2020    BILITOT 0 7 04/10/2018    TBILI 0 80 09/16/2020    EGFR 75 09/16/2020     Lab Results   Component Value Date    WBC 8 01 09/16/2020    HGB 13 8 09/16/2020    HCT 42 8 09/16/2020    MCV 94 09/16/2020     09/16/2020     Lab Results   Component Value Date    CHOLESTEROL 234 (H) 09/16/2020    CHOLESTEROL 172 07/18/2019    CHOLESTEROL 189 03/13/2019     Lab Results   Component Value Date     09/16/2020    HDL 76 (H) 07/18/2019    HDL 73 (H) 03/13/2019     Lab Results   Component Value Date    LDLCALC 89 09/16/2020    LDLCALC 73 07/18/2019    LDLCALC 88 03/13/2019     Lab Results   Component Value Date    TRIG 84 09/16/2020    TRIG 117 07/18/2019    TRIG 142 03/13/2019     No results found for: [place], [place]  Lab Results   Component Value Date    GAP9ROIICAQQ 1 690 07/18/2019     Lab Results   Component Value Date    CALCIUM 9 7 09/16/2020     No results found for: SPEP, UPEP  No results found for: BENI [id number]        Objective:      /74   Pulse 82   Ht 4' 6" (1 372 m)   Wt 54 9 kg (121 lb)   SpO2 98%   BMI 29 17 kg/m²          Physical Exam  Constitutional:       General: She is not in acute distress  Appearance: She is normal weight  She is not toxic-appearing  HENT:      Head: Normocephalic and atraumatic  Right Ear: External ear normal       Ears:      Comments: Right TM dull  Eyes:      Extraocular Movements: Extraocular movements intact  Pupils: Pupils are equal, round, and reactive to light  Cardiovascular:      Rate and Rhythm: Normal rate and regular rhythm  Pulmonary:      Effort: Pulmonary effort is normal       Breath sounds: Normal breath sounds  Abdominal:      General: Bowel sounds are normal  There is no distension  Palpations: Abdomen is soft  Tenderness: There is no abdominal tenderness  Musculoskeletal:      Right lower leg: Edema present  Left lower leg: Edema present  Comments: Has good pedal pulse in the left foot but has discoloration over the heel     Skin:     General: Skin is warm and dry  Neurological:      General: No focal deficit present  Mental Status: She is alert  Gait: Gait abnormal    Psychiatric:         Mood and Affect: Mood normal          Behavior: Behavior normal          Thought Content:  Thought content normal          Judgment: Judgment normal

## 2021-03-29 ENCOUNTER — IMMUNIZATIONS (OUTPATIENT)
Dept: FAMILY MEDICINE CLINIC | Facility: HOSPITAL | Age: 86
End: 2021-03-29

## 2021-03-29 DIAGNOSIS — Z23 ENCOUNTER FOR IMMUNIZATION: Primary | ICD-10-CM

## 2021-03-29 PROCEDURE — 91301 SARS-COV-2 / COVID-19 MRNA VACCINE (MODERNA) 100 MCG: CPT

## 2021-03-29 PROCEDURE — 0011A SARS-COV-2 / COVID-19 MRNA VACCINE (MODERNA) 100 MCG: CPT

## 2021-04-05 NOTE — PROGRESS NOTES
Assessment/Plan:    No problem-specific Assessment & Plan notes found for this encounter  Diagnoses and all orders for this visit:    Primary osteoarthritis of left knee           the left knee was injected with Depo-Medrol and Marcaine  She tolerated procedure quite well  Return back in 3 months for strength and motion check  Subjective:      Patient ID: Esteban Holland is a 80 y o  female  HPI      Patient has a history of degenerative joint disease of her left knee  She desires to have another set of corticosteroid injections  Recently, she just turned 8 years old  She states the injections help albeit not as long as they used to  She denies any numbness or tingling  She denies any fever or chills  The following portions of the patient's history were reviewed and updated as appropriate: allergies, current medications, past family history, past medical history, past social history, past surgical history and problem list     Review of Systems   Constitutional: Negative for chills, fever and unexpected weight change  HENT: Negative for hearing loss, nosebleeds and sore throat  Eyes: Negative for pain, redness and visual disturbance  Respiratory: Negative for cough, shortness of breath and wheezing  Cardiovascular: Negative for chest pain, palpitations and leg swelling  Gastrointestinal: Negative for abdominal pain, nausea and vomiting  Endocrine: Negative for polydipsia and polyuria  Genitourinary: Negative for dysuria and hematuria  Musculoskeletal: Positive for arthralgias, gait problem and myalgias  Negative for back pain, joint swelling, neck pain and neck stiffness  As noted in HPI   Skin: Negative for rash and wound  Neurological: Negative for dizziness, numbness and headaches  Psychiatric/Behavioral: Negative for decreased concentration and suicidal ideas  The patient is not nervous/anxious            Objective:      Ht 4' 6" (1 372 m)   Wt 54 9 kg (121 lb)   BMI 29 17 kg/m²          Physical Exam          Left lower extremity is neurovascular intact  Toes are pink and mobile  Compartments are soft  Range of motion is from 5-115 degrees  There is negative Lachman's, drawer, pivot shift test   There is medial joint tenderness, lateral joint tenderness, patellofemoral crepitation  There is a painful arc of motion of left knee  Neurologic intact distally  Large joint arthrocentesis: L knee  Universal Protocol:  Consent: Verbal consent obtained  Risks and benefits: risks, benefits and alternatives were discussed  Consent given by: patient  Time out: Immediately prior to procedure a "time out" was called to verify the correct patient, procedure, equipment, support staff and site/side marked as required  Patient understanding: patient states understanding of the procedure being performed  Test results: test results available and properly labeled  Site marked: the operative site was marked  Radiology Images displayed and confirmed   If images not available, report reviewed: imaging studies available  Patient identity confirmed: verbally with patient    Supporting Documentation  Indications: pain   Procedure Details  Location: knee - L knee  Preparation: Patient was prepped and draped in the usual sterile fashion  Needle size: 22 G  Ultrasound guidance: no  Approach: lateral  Medications administered: 4 mL bupivacaine 0 25 %; 2 mL methylPREDNISolone acetate 40 mg/mL    Analysis: fluid sample sent for laboratory analysis    Patient tolerance: patient tolerated the procedure well with no immediate complications

## 2021-04-13 PROBLEM — G57.92: Status: ACTIVE | Noted: 2021-01-01

## 2021-04-13 NOTE — LETTER
April 13, 2021     Aurora Real MD  [address] [facility] [address]    Patient: Cinthya Flowers   YOB: 1921   Date of Visit: 4/13/2021       Dear Dr Alvina Alamo:    Thank you for referring Cnithya Flowers to me for evaluation  Below are my notes for this consultation  If you have questions, please do not hesitate to call me  I look forward to following your patient along with you           Sincerely,        Brown Garcia MD        CC: No Recipients

## 2021-04-13 NOTE — PROGRESS NOTES
Assessment/Plan:     Patient presents with coolness in her left leg especially at nighttime, no ischemic rest pain or tissue loss  She has easily palpable pedal pulses bilaterally with mild swelling in the left lower extremity likely due to inflammation from multiple injections to her left knee  Plan: This is likely a neurogenic process from a skin coolness standpoint  No intervention is either indicated or necessary from a vascular standpoint  She might benefit from slight elevation of her upper body while reclining  Diagnoses and all orders for this visit:    Neuropathy of ankle, left    PVD (peripheral vascular disease) ([facility] [address] )  -     Ambulatory referral to Vascular Surgery        Subjective:      Patient ID: Cece Mora is a 80 y o  female  Pt is new to our office  She was referred here by Dr Eliseo Menard for PVD  She c/o coldness in her both legs from her knees to her toes  She denies any pain  The left leg is worse than the right  She has had no testing  Pt is taking Pravastatin  HPI    The following portions of the patient's history were reviewed and updated as appropriate: allergies, current medications, past family history, past medical history, past social history, past surgical history and problem list     Review of Systems   Constitutional: Negative  HENT: Positive for ear pain, hearing loss and sinus pressure  Eyes: Negative  Respiratory: Negative  Cardiovascular: Negative  Gastrointestinal: Negative  Endocrine: Negative  Genitourinary: Negative  Musculoskeletal: Positive for arthralgias  Skin: Negative  Allergic/Immunologic: Negative  Neurological: Negative  Hematological: Negative  Psychiatric/Behavioral: Negative  Objective: There were no vitals taken for this visit  Physical Exam      Oriented x3 no evidence of clinical depression      Eyes:  Sclera non-icteric    Skin: normal without evidence of inflammation    Neck is supple carotid pulses equal bilaterally no bruits heard    Chest lungs clear, heart regular rhythm  Abdomen soft nontender no evidence of pulsatile masses  Pulses are palpable bilateral Femoral  Popliteal, DP      modest swelling left lower extremity and knee, minimal edema  Neurological exam intact cranial nerves 2-12 grossly intact no gross motor sensory deficits detected  Imaging viewed and reviewed with Patient    I have reviewed and made appropriate changes to the review of systems input by the medical assistant  Vitals:    04/13/21 1353   BP: 124/72   BP Location: Left arm   Patient Position: Sitting   Cuff Size: Standard   Pulse: 75   Temp: (!) 93 3 °F (34 1 °C)   TempSrc: Tympanic   Weight: 54 7 kg (120 lb 9 6 oz)   Height: 4' 6" (1 372 m)       Patient Active Problem List   Diagnosis    Pure hypercholesterolemia    Sensorineural hearing loss (SNHL) of both ears    Eustachian tube dysfunction, right    Essential hypertension    Arthritis    Primary osteoarthritis of left knee    Primary osteoarthritis of both knees    Persistent proteinuria    Neuropathy of ankle, left       Past Surgical History:   Procedure Laterality Date    COLON SURGERY      Colon resection with open surgery     COLONOSCOPY  2013    Positive for tumor     HYSTERECTOMY      OTHER SURGICAL HISTORY      fibrous tumor    RECTAL SURGERY  09/2013    tumor       Family History   Problem Relation Age of Onset    Hypertension Father     Heart failure Father        Social History     Socioeconomic History    Marital status:       Spouse name: Not on file    Number of children: Not on file    Years of education: Not on file    Highest education level: Not on file   Occupational History    Occupation: Retired    Social Needs    Financial resource strain: Not on file    Food insecurity     Worry: Not on file     Inability: Not on file   [employer] needs     Medical: Not on file     Non-medical: Not on file   Tobacco Use    Smoking status: Never Smoker    Smokeless tobacco: Never Used   Substance and Sexual Activity    Alcohol use: Not Currently     Comment: Denies alcohol use - As per [employer]     Drug use: Never     Comment: Denies drug use - As per Calli Edouard     Sexual activity: Not on file   Lifestyle    Physical activity     Days per week: Not on file     Minutes per session: Not on file    Stress: Not on file   Relationships    Social connections     Talks on phone: Not on file     Gets together: Not on file     Attends Jehovah's witness service: Not on file     Active member of club or organization: Not on file     Attends meetings of clubs or organizations: Not on file     Relationship status: Not on file    Intimate partner violence     Fear of current or ex partner: Not on file     Emotionally abused: Not on file     Physically abused: Not on file     Forced sexual activity: Not on file   Other Topics Concern    Not on file   Social History Narrative    Not on file       Allergies   Allergen Reactions    Cephalosporins Other (See Comments)     Per pharmacy         Current Outpatient Medications:     ALPRAZolam (XANAX) 0 25 mg tablet, Take 1 tablet (0 25 mg total) by mouth daily at bedtime as needed for anxiety, Disp: 30 tablet, Rfl: 0    diclofenac sodium (VOLTAREN) 1 %, APPLY 2 GRAMS TOPICALLY 4 TIMES A DAY, Disp: 100 g, Rfl: 0    fluticasone (FLONASE) 50 mcg/act nasal spray, 2 sprays into each nostril daily, Disp: 1 Bottle, Rfl: 1    gabapentin (NEURONTIN) 800 mg tablet, Take by mouth, Disp: , Rfl:     hydrochlorothiazide (MICROZIDE) 12 5 mg capsule, Take 1 capsule (12 5 mg total) by mouth every morning, Disp: 30 capsule, Rfl: 2    naloxone (NARCAN) 4 mg/0 1 mL nasal spray, Administer 1 spray into a nostril   If breathing does not return to normal or if breathing difficulty resumes after 2-3 minutes, give another dose in the other nostril using a new spray , Disp: 1 each, Rfl: 1    omeprazole (PriLOSEC) 20 mg delayed release capsule, Take 1 capsule (20 mg total) by mouth daily, Disp: 30 capsule, Rfl: 4    oxyCODONE-acetaminophen (PERCOCET) 5-325 mg per tablet, Take 1 tablet by mouth 2 (two) times a day as needed for moderate painMax Daily Amount: 2 tablets, Disp: 30 tablet, Rfl: 0    potassium chloride (K-DUR,KLOR-CON) 20 mEq tablet, Take 1 tablet (20 mEq total) by mouth daily, Disp: 30 tablet, Rfl: 6    pravastatin (PRAVACHOL) 20 mg tablet, Take 1 tablet (20 mg total) by mouth daily, Disp: 90 tablet, Rfl: 3    verapamil (CALAN-SR) 180 mg CR tablet, Take 1 tablet (180 mg total) by mouth daily at bedtime, Disp: 30 tablet, Rfl: 6

## 2021-04-13 NOTE — PATIENT INSTRUCTIONS
Patient presents with coolness in her left leg especially at nighttime, no ischemic rest pain or tissue loss  She has easily palpable pedal pulses bilaterally with mild swelling in the left lower extremity likely due to inflammation from multiple injections to her left knee  Plan: This is likely a neurogenic process from a skin coolness standpoint  No intervention is either indicated or necessary from a vascular standpoint  She might benefit from slight elevation of her upper body while reclining

## 2021-07-09 NOTE — PROGRESS NOTES
Assessment/Plan:    No problem-specific Assessment & Plan notes found for this encounter  Diagnoses and all orders for this visit:    Primary osteoarthritis of left knee            The left knee was injected with Depo-Medrol and Marcaine  She tolerated procedure quite well  Return back in 3 months for strength and motion check    Subjective:      Patient ID: Rajesh Ogden is a 80 y o  female  HPI     the patient has a history of degenerative joint disease of her left knee  She desires to have her quarterly set of injections  She denies any problems from last visit  She denies any numbness or tingling  She denies any fever or chills  The following portions of the patient's history were reviewed and updated as appropriate: allergies, current medications, past family history, past medical history, past social history, past surgical history and problem list     Review of Systems   Constitutional: Negative for chills, fever and unexpected weight change  HENT: Negative for hearing loss, nosebleeds and sore throat  Eyes: Negative for pain, redness and visual disturbance  Respiratory: Negative for cough, shortness of breath and wheezing  Cardiovascular: Negative for chest pain, palpitations and leg swelling  Gastrointestinal: Negative for abdominal pain, nausea and vomiting  Endocrine: Negative for polydipsia and polyuria  Genitourinary: Negative for dysuria and hematuria  Musculoskeletal: Positive for arthralgias, gait problem, joint swelling and myalgias  Negative for back pain, neck pain and neck stiffness  As noted in HPI   Skin: Negative for rash and wound  Neurological: Negative for dizziness, numbness and headaches  Psychiatric/Behavioral: Negative for decreased concentration and suicidal ideas  The patient is not nervous/anxious            Objective:      Ht 4' 6" (1 372 m)   Wt 54 4 kg (120 lb)   BMI 28 93 kg/m²          Physical Exam          Left lower extremity is neurovascular intact  Toes are pink and mobile  Compartments are soft  There is no warmth erythema or ecchymosis  Negative Homans  Range of motion is from 5-110 degrees  There is medial joint tenderness, lateral joint tenderness, and patellofemoral crepitation  There is a painful arc of motion throughout her left knee  Large joint arthrocentesis: L knee  Universal Protocol:  Consent: Verbal consent obtained  Risks and benefits: risks, benefits and alternatives were discussed  Consent given by: patient  Time out: Immediately prior to procedure a "time out" was called to verify the correct patient, procedure, equipment, support staff and site/side marked as required  Patient understanding: patient states understanding of the procedure being performed  Test results: test results available and properly labeled  Site marked: the operative site was marked  Radiology Images displayed and confirmed   If images not available, report reviewed: imaging studies available  Patient identity confirmed: verbally with patient    Supporting Documentation  Indications: pain   Procedure Details  Location: knee - L knee  Needle size: 22 G  Ultrasound guidance: no  Approach: lateral  Medications administered: 4 mL bupivacaine 0 25 %; 2 mL methylPREDNISolone acetate 40 mg/mL

## 2021-07-26 PROBLEM — E87.6 HYPOKALEMIA: Status: ACTIVE | Noted: 2021-01-01

## 2021-07-26 PROBLEM — N18.31 STAGE 3A CHRONIC KIDNEY DISEASE (HCC): Status: ACTIVE | Noted: 2021-01-01

## 2021-07-26 NOTE — ASSESSMENT & PLAN NOTE
Continue verapamil for anti proteinuric and blood pressure control  She has a normal MAC and has mild tubular proteinuria

## 2021-07-26 NOTE — ASSESSMENT & PLAN NOTE
Lab Results   Component Value Date    EGFR 56 07/20/2021    EGFR 75 09/16/2020    EGFR 62 12/04/2019    CREATININE 0 85 07/20/2021    CREATININE 0 61 09/16/2020    CREATININE 0 80 12/04/2019   Due to age related nephron loss and hypertension

## 2021-07-26 NOTE — PATIENT INSTRUCTIONS
Stop hydrochlorothiazide  Take potassium for 2 weeks and then have lab work  The day you go for your labs in 2 weeks do not take potassium pill that morning    This is nonfasting

## 2021-07-26 NOTE — PROGRESS NOTES
[address] [facility] MD    Name: Cande Jarrell  YOB: 1921      Assessment/Plan:         Problem List Items Addressed This Visit        Cardiovascular and Mediastinum    Essential hypertension     Well controlled         Relevant Medications    potassium chloride (K-DUR,KLOR-CON) 20 mEq tablet       Nervous and Auditory    Sensorineural hearing loss (SNHL) of both ears    Relevant Medications    potassium chloride (K-DUR,KLOR-CON) 20 mEq tablet       Musculoskeletal and Integument    Arthritis - Primary     She is frustrated by her balance issues  Continue current treatment         Primary osteoarthritis of left knee    Primary osteoarthritis of both knees     Continue Voltaren Gel            Genitourinary    Stage 3a chronic kidney disease ([facility] [address] )     Lab Results   Component Value Date    EGFR 56 07/20/2021    EGFR 75 09/16/2020    EGFR 62 12/04/2019    CREATININE 0 85 07/20/2021    CREATININE 0 61 09/16/2020    CREATININE 0 80 12/04/2019   Due to age related nephron loss and hypertension           Relevant Orders    Comprehensive metabolic panel    CBC and differential    Microalbumin / creatinine urine ratio    Protein / creatinine ratio, urine    Urinalysis with microscopic       Other    Pure hypercholesterolemia     Continue pravastatin         Relevant Orders    LDL cholesterol, direct    Persistent proteinuria     Continue verapamil for anti proteinuric and blood pressure control  She has a normal MAC and has mild tubular proteinuria         Relevant Orders    Comprehensive metabolic panel    CBC and differential    Microalbumin / creatinine urine ratio    Protein / creatinine ratio, urine    Urinalysis with microscopic    Hypokalemia     Continue potassium daily for 2 weeks then have labs drawn  Stop HCTZ         Relevant Orders    Basic metabolic panel    Magnesium    Comprehensive metabolic panel    CBC and differential    Microalbumin / creatinine urine ratio    Protein / creatinine ratio, urine    Urinalysis with microscopic      Other Visit Diagnoses     Diuretic-induced hypokalemia        Relevant Medications    potassium chloride (K-DUR,KLOR-CON) 20 mEq tablet            Subjective:      Patient ID: Zaira Hall is a Ankur  [age] y o  female  HPI  She has a history of hypertension, CAD GERD and hyperlipidemia  She has hypokalemia and medications were reviewed    The following portions of the patient's history were reviewed and updated as appropriate: allergies, current medications, past family history, past medical history, past social history, past surgical history and problem list     Review of Systems   HENT: Positive for hearing loss  Eyes: Negative for visual disturbance  Respiratory: Negative for cough and shortness of breath  Cardiovascular: Negative for chest pain and leg swelling  Gastrointestinal: Negative for abdominal pain, blood in stool, constipation and diarrhea  Genitourinary: Negative for decreased urine volume, difficulty urinating, hematuria and urgency  Nocturia x 2   Musculoskeletal: Positive for arthralgias and gait problem  Has a walker and has balance issues   Neurological: Negative for dizziness, weakness, light-headedness and headaches  Hematological: Does not bruise/bleed easily  Social History     Socioeconomic History    Marital status:       Spouse name: None    Number of children: None    Years of education: None    Highest education level: None   Occupational History    Occupation: Retired    Tobacco Use    Smoking status: Never Smoker    Smokeless tobacco: Never Used   Vaping Use    Vaping Use: Never used   Substance and Sexual Activity    Alcohol use: Not Currently     Comment: Denies alcohol use - As per [employer] Drug use: Never     Comment: Denies drug use - As per Calli Edouard     Sexual activity: None   Other Topics Concern    None   Social History Narrative  None     Social Determinants of Health     Financial Resource Strain:     Difficulty of Paying Living Expenses:    Food Insecurity:     Worried About Running Out of Food in the Last Year:     [address] in the Last Year:    Transportation Needs:     Lack of Transportation (Medical):      Lack of Transportation (Non-Medical):    Physical Activity:     Days of Exercise per Week:     Minutes of Exercise per Session:    Stress:     Feeling of Stress :    Social Connections:     Frequency of Communication with Friends and Family:     Frequency of Social Gatherings with Friends and Family:     Attends [employer] Services:     Active Member of Clubs or Organizations:     Attends Club or Organization Meetings:     Marital Status:    Intimate Partner Violence:     Fear of Current or Ex-Partner:     Emotionally Abused:     Physically Abused:     Sexually Abused:      Past Medical History:   Diagnosis Date    Benign essential hypertension     Cancer ([facility] [address] )     rectal and sigmoid colon     Carcinoma in situ     Carotid artery occlusion     Disorder of rectum and anus     Disorder of skin     Diverticular disease of colon     Edema     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     Essential hypertension     Gastroesophageal reflux disease     Herpes zoster without complication     Hyperlipidemia     Hypokalemia     Idiopathic peripheral neuropathy     Iron deficiency anemia     Localized, primary osteoarthritis     Lower leg    Malaise and fatigue     Mixed hyperlipidemia     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     Post-herpetic polyneuropathy     Unspecified osteoarthritis, unspecified site     Vitamin D deficiency      Past Surgical History:   Procedure Laterality Date    COLON SURGERY      Colon resection with open surgery     COLONOSCOPY  2013    Positive for tumor     HYSTERECTOMY      OTHER SURGICAL HISTORY      fibrous tumor  RECTAL SURGERY  09/2013    tumor       Current Outpatient Medications:     ALPRAZolam (XANAX) 0 25 mg tablet, Take 1 tablet (0 25 mg total) by mouth daily at bedtime as needed for anxiety, Disp: 30 tablet, Rfl: 1    Diclofenac Sodium (VOLTAREN) 1 %, Apply 2 g topically 4 (four) times a day, Disp: 100 g, Rfl: 1    fluticasone (FLONASE) 50 mcg/act nasal spray, 2 sprays into each nostril daily, Disp: 1 g, Rfl: 3    gabapentin (NEURONTIN) 800 mg tablet, Take by mouth, Disp: , Rfl:     naloxone (NARCAN) 4 mg/0 1 mL nasal spray, Administer 1 spray into a nostril   If breathing does not return to normal or if breathing difficulty resumes after 2-3 minutes, give another dose in the other nostril using a new spray , Disp: 1 each, Rfl: 1    omeprazole (PriLOSEC) 20 mg delayed release capsule, Take 1 capsule (20 mg total) by mouth daily, Disp: 30 capsule, Rfl: 4    oxyCODONE-acetaminophen (PERCOCET) 5-325 mg per tablet, Take 1 tablet by mouth 2 (two) times a day as needed for moderate painMax Daily Amount: 2 tablets, Disp: 30 tablet, Rfl: 0    potassium chloride (K-DUR,KLOR-CON) 20 mEq tablet, Take 1 tablet (20 mEq total) by mouth daily, Disp: 30 tablet, Rfl: 6    pravastatin (PRAVACHOL) 20 mg tablet, Take 1 tablet (20 mg total) by mouth daily, Disp: 90 tablet, Rfl: 3    verapamil (CALAN-SR) 180 mg CR tablet, Take 1 tablet (180 mg total) by mouth daily at bedtime, Disp: 30 tablet, Rfl: 6    Lab Results   Component Value Date     04/10/2018    SODIUM 132 (L) 07/20/2021    K 3 4 (L) 07/20/2021    CL 96 (L) 07/20/2021    CO2 28 07/20/2021    ANIONGAP 11 3 04/10/2018    AGAP 8 07/20/2021    BUN 21 07/20/2021    CREATININE 0 85 07/20/2021    GLUF 91 07/20/2021    CALCIUM 9 7 07/20/2021    AST 21 07/20/2021    ALT 25 07/20/2021    ALKPHOS 117 (H) 07/20/2021    PROT 6 9 04/10/2018    TP 7 8 07/20/2021    BILITOT 0 7 04/10/2018    TBILI 0 77 07/20/2021    EGFR 56 07/20/2021     Lab Results   Component Value Date WBC 11 62 (H) 07/20/2021    HGB 15 0 07/20/2021    HCT 46 5 (H) 07/20/2021    MCV 94 07/20/2021     07/20/2021     Lab Results   Component Value Date    CHOLESTEROL 241 (H) 07/20/2021    CHOLESTEROL 234 (H) 09/16/2020    CHOLESTEROL 172 07/18/2019     Lab Results   Component Value Date     07/20/2021     09/16/2020    HDL 76 (H) 07/18/2019     Lab Results   Component Value Date    LDLCALC 100 07/20/2021    LDLCALC 89 09/16/2020    LDLCALC 73 07/18/2019     Lab Results   Component Value Date    TRIG 104 07/20/2021    TRIG 84 09/16/2020    TRIG 117 07/18/2019     No results found for: [place], [place]  Lab Results   Component Value Date    ZDO6XNTUEULA 1 690 07/18/2019     Lab Results   Component Value Date    CALCIUM 9 7 07/20/2021     No results found for: SPEP, UPEP  No results found for: BENI [id number]        Objective:      /74   Pulse 80   Ht 4' 6" (1 372 m)   Wt 55 8 kg (123 lb)   SpO2 98%   BMI 29 66 kg/m²          Physical Exam  Constitutional:       General: She is not in acute distress  Appearance: She is normal weight  She is not toxic-appearing  HENT:      Head: Normocephalic and atraumatic  Right Ear: External ear normal       Left Ear: External ear normal    Eyes:      Extraocular Movements: Extraocular movements intact  Conjunctiva/sclera: Conjunctivae normal       Pupils: Pupils are equal, round, and reactive to light  Cardiovascular:      Rate and Rhythm: Normal rate and regular rhythm  Pulmonary:      Effort: Pulmonary effort is normal       Breath sounds: Normal breath sounds  Abdominal:      General: Bowel sounds are normal  There is no distension  Palpations: Abdomen is soft  Musculoskeletal:         General: Normal range of motion  Right lower leg: No edema  Left lower leg: No edema  Skin:     General: Skin is warm and dry  Neurological:      General: No focal deficit present        Mental Status: She is alert and oriented to person, place, and time  Motor: Weakness present  Gait: Gait abnormal    Psychiatric:         Mood and Affect: Mood normal          Behavior: Behavior normal          Thought Content:  Thought content normal          Judgment: Judgment normal

## 2021-08-30 NOTE — TELEPHONE ENCOUNTER
Email sent to JOAN CHEEMA     Patient fell 8/29  STL xray 8/30/2021   Injured r wrist and l knee    Prefers to see Dr Sang Cali    Can patient be put on the schedule asap?      Daughter Marin Borja   # [phone]

## 2021-08-30 NOTE — PROGRESS NOTES
[facility]        NAME: Renu Sales is a 80 y o  female  : 3/29/1921    MRN: [id number]  DATE: 2021  TIME: 7:42 PM    Assessment and Plan   Fall, initial encounter [W19  XXXA]  1  Fall, initial encounter  XR wrist 3+ vw right    XR hand 3+ vw right    XR knee 4+ vw left injury   2  Right wrist injury, initial encounter     3  Knee strain, left, initial encounter           Patient Instructions     Patient Instructions   There is a possible fracture in the right wrist   Will place in a brace  Peep brace on  Rest   Ice every 3-4 hours for 20 minutes  Tylenol as needed for pain  Ace wrap also applied to left knee  Follow-up with Orthopedics  You state you will call and schedule follow-up as you already seeing Dr Thierry Mckenzie  Go to ER with any worsening symptoms, increased pain, increased swelling, numbness or tingling or pain into the lower leg  The the      Chief Complaint     Chief Complaint   Patient presents with    Fall     fell yesterday, right wrist, left knee  History of Present Illness   Renu Sales presents to the clinic c/o    This is a 8 year old female and was trying to take of s  No head injury  No LOC  she states she is trying to take off her shoe and fell  She injured her left knee and right wrist   She states she already has arthritis in the left knee and has pain at baseline and gets injection  She states it worse  She is having pain with ambulation  She also pain in her right wrist   She is having bruising into the hand  Decreased range of motion due to pain  She has tried her pain medication for pain which has not significantly helped  Review of Systems   Review of Systems   Constitutional: Negative  Respiratory: Negative  Cardiovascular: Negative  Musculoskeletal: Positive for arthralgias  Psychiatric/Behavioral: Negative            Current Medications     Long-Term Medications   Medication Sig Dispense Refill    ALPRAZolam Carito Lora) 0 25 mg tablet Take 1 tablet (0 25 mg total) by mouth daily at bedtime as needed for anxiety 30 tablet 1    Diclofenac Sodium (VOLTAREN) 1 % Apply 2 g topically 4 (four) times a day 100 g 1    fluticasone (FLONASE) 50 mcg/act nasal spray 2 sprays into each nostril daily 1 g 3    gabapentin (NEURONTIN) 800 mg tablet Take by mouth      naloxone (NARCAN) 4 mg/0 1 mL nasal spray Administer 1 spray into a nostril  If breathing does not return to normal or if breathing difficulty resumes after 2-3 minutes, give another dose in the other nostril using a new spray  1 each 1    omeprazole (PriLOSEC) 20 mg delayed release capsule Take 1 capsule (20 mg total) by mouth daily 30 capsule 4    potassium chloride (K-DUR,KLOR-CON) 20 mEq tablet Take 1 tablet (20 mEq total) by mouth daily 30 tablet 6    pravastatin (PRAVACHOL) 20 mg tablet Take 1 tablet (20 mg total) by mouth daily 90 tablet 3    verapamil (CALAN-SR) 180 mg CR tablet Take 1 tablet (180 mg total) by mouth daily at bedtime 30 tablet 6       Current Allergies     Allergies as of 08/30/2021 - Reviewed 08/30/2021   Allergen Reaction Noted    Cephalosporins Other (See Comments) 01/29/2021            The following portions of the patient's history were reviewed and updated as appropriate: allergies, current medications, past family history, past medical history, past social history, past surgical history and problem list     Objective   Pulse 67   Temp (!) 97 3 °F (36 3 °C)   Resp 18   SpO2 95%        Physical Exam     Physical Exam  Vitals and nursing note reviewed  Constitutional:       Appearance: Normal appearance  Cardiovascular:      Rate and Rhythm: Normal rate and regular rhythm  Pulses: Normal pulses  Heart sounds: Normal heart sounds  Musculoskeletal:      Comments: Left knee: Tenderness to palpate over the knee  There is mild swelling noted  There is no bruising  No laxity in the joint      Right wrist:  Tenderness to palpate over the wrist joint  There is mild bruising noted into the hand  There is tenderness to palpate over the hand  Decreased range of motion due to pain  Mild swelling noted  Neurological:      Mental Status: She is alert and oriented to person, place, and time  Psychiatric:         Mood and Affect: Mood normal          Behavior: Behavior normal          Thought Content: Thought content normal          Judgment: Judgment normal          Right wrist possible fracture noted will wait for final read due to significant degenerative changes hard to determine if there is a fracture  Left knee significant degenerative changes

## 2021-08-31 NOTE — PATIENT INSTRUCTIONS
Carpal Tunnel Syndrome Exercises   AMBULATORY CARE:   What you need to know about carpal tunnel syndrome (CTS) exercises:  CTS exercises can help relieve pain and increase your range of motion  Your healthcare provider or physical therapist can tell you how often to do the exercises  CTS exercises:   · Finger extensions:  Hold your fingers and thumb close together  Keep them straight  Put a rubber band around the outside of your fingers and thumb  Spread your fingers apart  Then slowly bring them together without letting the rubber band fall off  Repeat 40 times  · Wrist flexor stretch:  Hold your arm out straight  Grasp your fingers with your other hand  Slowly bend the fingers back (palm facing away) until you feel a stretch in your wrist  Hold for 10 seconds  Repeat 5 times  · Wrist extensor stretch:  Hold your arm out straight  Grasp your fingers with your other hand  Slowly bend the fingers and hand down (palm facing you) until you feel a stretch on top of your hand  Hold for 10 seconds  Repeat 5 times  · Wrist curls without weights:  Sit in a chair with your forearm resting on your thigh or a table  With your palm facing down, flex your wrist up 3 inches and slowly lower it  Turn your forearm over and repeat with your palm facing up  Do each exercise 20 times  · Shrugs:  Stand with your arms by your side  Lift your shoulders up to your ears and hold for 1 second  Then pull your shoulders back, pinching your shoulder blades together  Hold for 1 second  Then relax your shoulders  Repeat 20 times  © Copyright [employer] 2021 Information is for End User's use only and may not be sold, redistributed or otherwise used for commercial purposes  All illustrations and images included in CareNotes® are the copyrighted property of [employer]  or Lilian Vaughn  The above information is an  only   It is not intended as medical advice for individual conditions or treatments  Talk to your doctor, nurse or pharmacist before following any medical regimen to see if it is safe and effective for you

## 2021-08-31 NOTE — PROGRESS NOTES
Assessment:  1  Primary osteoarthritis of left knee     2  Pain in right wrist     3  Contusion of right wrist, initial encounter     4  Contusion of left knee, initial encounter          it appears the patient has stayed effusion of her right wrist / hand and left knee  X-rays personally reviewed show no acute findings  Patient was encouraged to perform range of motion and strengthening   Exercises  Follow-up office he a regular scheduled appointment  If there is any problems sooner, she   Will not hesitate to let us know    Plan:  The patient had fallen 8/29/2021  She displays well exam and radiograph with no fractures  She is encouraged to do gentle ROM exercises and is provided with wrist exercises  She should follow up in 10/2021  To do next visit:  Return for already scheduled appointment in 10/2021       The above stated was discussed in layman's terms and the patient expressed understanding  All questions were answered to the patient's satisfaction  Scribe Attestation    I,:  Rhonda Norwood am acting as a scribe while in the presence of the attending physician :       I,:  Chichi Melvin,  personally performed the services described in this documentation    as scribed in my presence :             Subjective:   Gerardo Duran is a 80 y o  female who presents for initial evaluation of right wrist and left knee  She did fall in her kitchen 8/29/2021  She was seen at urgent care 8/30/2021 and provided with right wrist brace  Today she complains of generalized right wrist pain and left knee pain  Any use of right wrist and left knee aggravates  She does walk yet uses walker or wheel chair  She does use Tylenol for pain  She denies past knee or wrist surgery          Review of systems negative unless otherwise specified in HPI    Past Medical History:   Diagnosis Date    Benign essential hypertension     Cancer (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     Essential hypertension     Gastroesophageal reflux disease     Herpes zoster without complication     Hyperlipidemia     Hypokalemia     Idiopathic peripheral neuropathy     Iron deficiency anemia     Localized, primary osteoarthritis     Lower leg    Malaise and fatigue     Mixed hyperlipidemia     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     Post-herpetic polyneuropathy     Unspecified osteoarthritis, unspecified site     Vitamin D deficiency        Past Surgical History:   Procedure Laterality Date    COLON SURGERY      Colon resection with open surgery     COLONOSCOPY  2013    Positive for tumor     HYSTERECTOMY      OTHER SURGICAL HISTORY      fibrous tumor    RECTAL SURGERY  09/2013    tumor       Family History   Problem Relation Age of Onset    Hypertension Father     Heart failure Father        Social History     Occupational History    Occupation: Retired    Tobacco Use    Smoking status: Never Smoker    Smokeless tobacco: Never Used   Vaping Use    Vaping Use: Never used   Substance and Sexual Activity    Alcohol use: Not Currently     Comment: Denies alcohol use - As per [employer] Drug use: Never     Comment: Denies drug use - As per [facility] Jay[facility] Edouard     Sexual activity: Not on file         Current Outpatient Medications:     ALPRAZolam (XANAX) 0 25 mg tablet, Take 1 tablet (0 25 mg total) by mouth daily at bedtime as needed for anxiety, Disp: 30 tablet, Rfl: 1    Diclofenac Sodium (VOLTAREN) 1 %, Apply 2 g topically 4 (four) times a day, Disp: 100 g, Rfl: 1    gabapentin (NEURONTIN) 800 mg tablet, Take by mouth, Disp: , Rfl:     naloxone (NARCAN) 4 mg/0 1 mL nasal spray, Administer 1 spray into a nostril   If breathing does not return to normal or if breathing difficulty resumes after 2-3 minutes, give another dose in the other nostril using a new spray , Disp: 1 each, Rfl: 1    omeprazole (PriLOSEC) 20 mg delayed release capsule, Take 1 capsule (20 mg total) by mouth daily, Disp: 30 capsule, Rfl: 4    oxyCODONE-acetaminophen (PERCOCET) 5-325 mg per tablet, Take 1 tablet by mouth 2 (two) times a day as needed for moderate painMax Daily Amount: 2 tablets, Disp: 30 tablet, Rfl: 0    potassium chloride (K-DUR,KLOR-CON) 20 mEq tablet, Take 1 tablet (20 mEq total) by mouth daily, Disp: 30 tablet, Rfl: 6    pravastatin (PRAVACHOL) 20 mg tablet, Take 1 tablet (20 mg total) by mouth daily, Disp: 90 tablet, Rfl: 3    verapamil (CALAN-SR) 180 mg CR tablet, Take 1 tablet (180 mg total) by mouth daily at bedtime, Disp: 30 tablet, Rfl: 6    fluticasone (FLONASE) 50 mcg/act nasal spray, 2 sprays into each nostril daily, Disp: 1 g, Rfl: 3    Allergies   Allergen Reactions    Cephalosporins Other (See Comments)     Per pharmacy            Vitals:    08/31/21 1303   BP: (!) 185/83   Pulse: 77       Objective:  Physical exam  · General: Awake, Alert, Oriented  · Eyes: Pupils equal, round and reactive to light  · Heart: regular rate and rhythm  · Lungs: No audible wheezing  · Abdomen: soft                    Ortho Exam  Left knee:  No erythema or ecchymosis  No effusion or swelling  Normal strength  Good ROM   Calf compartments soft and supple  Sensation intact  Toes are warm sensate and mobile    Right wrist:  Patient can make fist and move all fingers  Non-tender over distal radius/ulna and carpus  No erythema or ecchymosis      Diagnostics, reviewed and taken today if performed as documented: The attending physician has personally reviewed the pertinent films in PACS and interpretation is as follows:  Left knee x-ray: moderate-severe arthritic changes with no fractues  Right wrist x-ray:  No fractures         Procedures, if performed today:    Procedures    None performed Portions of the record may have been created with voice recognition software  Occasional wrong word or "sound a like" substitutions may have occurred due to the inherent limitations of voice recognition software  Read the chart carefully and recognize, using context, where substitutions have occurred

## 2021-09-01 NOTE — TELEPHONE ENCOUNTER
Daughters aware of x-ray results from urgent care visit  She states she did see Dr jenkins yesterday  No further questions

## 2022-01-01 ENCOUNTER — APPOINTMENT (INPATIENT)
Dept: CT IMAGING | Facility: HOSPITAL | Age: 87
DRG: 065 | End: 2022-01-01
Payer: MEDICARE

## 2022-01-01 ENCOUNTER — APPOINTMENT (EMERGENCY)
Dept: CT IMAGING | Facility: HOSPITAL | Age: 87
DRG: 065 | End: 2022-01-01
Payer: MEDICARE

## 2022-01-01 ENCOUNTER — APPOINTMENT (INPATIENT)
Dept: NON INVASIVE DIAGNOSTICS | Facility: HOSPITAL | Age: 87
DRG: 065 | End: 2022-01-01
Payer: MEDICARE

## 2022-01-01 ENCOUNTER — OFFICE VISIT (OUTPATIENT)
Dept: OBGYN CLINIC | Facility: CLINIC | Age: 87
End: 2022-01-01
Payer: MEDICARE

## 2022-01-01 ENCOUNTER — TELEPHONE (OUTPATIENT)
Dept: NEPHROLOGY | Facility: CLINIC | Age: 87
End: 2022-01-01

## 2022-01-01 ENCOUNTER — APPOINTMENT (INPATIENT)
Dept: MRI IMAGING | Facility: HOSPITAL | Age: 87
DRG: 065 | End: 2022-01-01
Payer: MEDICARE

## 2022-01-01 ENCOUNTER — HOSPITAL ENCOUNTER (INPATIENT)
Facility: HOSPITAL | Age: 87
LOS: 7 days | Discharge: NURSING, REHAB OR LONG-TERM CARE | DRG: 065 | End: 2022-03-24
Attending: EMERGENCY MEDICINE | Admitting: INTERNAL MEDICINE
Payer: MEDICARE

## 2022-01-01 VITALS
TEMPERATURE: 100.3 F | WEIGHT: 127.43 LBS | RESPIRATION RATE: 30 BRPM | DIASTOLIC BLOOD PRESSURE: 102 MMHG | HEART RATE: 168 BPM | OXYGEN SATURATION: 94 % | SYSTOLIC BLOOD PRESSURE: 164 MMHG | HEIGHT: 56 IN | BODY MASS INDEX: 28.66 KG/M2

## 2022-01-01 VITALS — HEIGHT: 55 IN | BODY MASS INDEX: 30.28 KG/M2

## 2022-01-01 DIAGNOSIS — I10 ESSENTIAL HYPERTENSION: ICD-10-CM

## 2022-01-01 DIAGNOSIS — F41.9 ANXIETY: ICD-10-CM

## 2022-01-01 DIAGNOSIS — E87.6 DIURETIC-INDUCED HYPOKALEMIA: ICD-10-CM

## 2022-01-01 DIAGNOSIS — M17.0 PRIMARY OSTEOARTHRITIS OF BOTH KNEES: Primary | ICD-10-CM

## 2022-01-01 DIAGNOSIS — Z71.89 GOALS OF CARE, COUNSELING/DISCUSSION: ICD-10-CM

## 2022-01-01 DIAGNOSIS — R60.0 EDEMA OF BOTH LOWER LEGS: Primary | ICD-10-CM

## 2022-01-01 DIAGNOSIS — I65.22 ICAO (INTERNAL CAROTID ARTERY OCCLUSION), LEFT: ICD-10-CM

## 2022-01-01 DIAGNOSIS — T50.2X5A DIURETIC-INDUCED HYPOKALEMIA: ICD-10-CM

## 2022-01-01 DIAGNOSIS — R09.89 SYMPTOMS OF CEREBROVASCULAR ACCIDENT (CVA): Primary | ICD-10-CM

## 2022-01-01 DIAGNOSIS — J01.40 ACUTE PANSINUSITIS, RECURRENCE NOT SPECIFIED: ICD-10-CM

## 2022-01-01 DIAGNOSIS — H90.3 SENSORINEURAL HEARING LOSS (SNHL) OF BOTH EARS: ICD-10-CM

## 2022-01-01 LAB
2HR DELTA HS TROPONIN: 3 NG/L
4HR DELTA HS TROPONIN: -1 NG/L
ABO GROUP BLD: NORMAL
ABO GROUP BLD: NORMAL
ANION GAP SERPL CALCULATED.3IONS-SCNC: 10 MMOL/L (ref 4–13)
ANION GAP SERPL CALCULATED.3IONS-SCNC: 10 MMOL/L (ref 4–13)
ANION GAP SERPL CALCULATED.3IONS-SCNC: 6 MMOL/L (ref 4–13)
AORTIC ROOT: 2.9 CM
AORTIC VALVE MEAN VELOCITY: 10.7 M/S
APICAL FOUR CHAMBER EJECTION FRACTION: 57 %
APTT PPP: 29 SECONDS (ref 23–37)
ASCENDING AORTA: 3.1 CM (ref 1.79–2.68)
ATRIAL RATE: 107 BPM
ATRIAL RATE: 202 BPM
AV AREA BY CONTINUOUS VTI: 1.1 CM2
AV AREA PEAK VELOCITY: 1.1 CM2
AV LVOT MEAN GRADIENT: 1 MMHG
AV LVOT PEAK GRADIENT: 1 MMHG
AV MEAN GRADIENT: 5 MMHG
AV PEAK GRADIENT: 9 MMHG
AV VALVE AREA: 1.07 CM2
AV VELOCITY RATIO: 0.38
BLD GP AB SCN SERPL QL: NEGATIVE
BUN SERPL-MCNC: 13 MG/DL (ref 5–25)
BUN SERPL-MCNC: 15 MG/DL (ref 5–25)
BUN SERPL-MCNC: 20 MG/DL (ref 5–25)
CALCIUM SERPL-MCNC: 8.4 MG/DL (ref 8.4–10.2)
CALCIUM SERPL-MCNC: 8.6 MG/DL (ref 8.4–10.2)
CALCIUM SERPL-MCNC: 9.1 MG/DL (ref 8.4–10.2)
CARDIAC TROPONIN I PNL SERPL HS: 24 NG/L
CARDIAC TROPONIN I PNL SERPL HS: 25 NG/L
CARDIAC TROPONIN I PNL SERPL HS: 28 NG/L
CHLORIDE SERPL-SCNC: 97 MMOL/L (ref 96–108)
CHLORIDE SERPL-SCNC: 97 MMOL/L (ref 96–108)
CHLORIDE SERPL-SCNC: 99 MMOL/L (ref 96–108)
CHOLEST SERPL-MCNC: 189 MG/DL
CO2 SERPL-SCNC: 27 MMOL/L (ref 21–32)
CO2 SERPL-SCNC: 27 MMOL/L (ref 21–32)
CO2 SERPL-SCNC: 29 MMOL/L (ref 21–32)
CREAT SERPL-MCNC: 0.47 MG/DL (ref 0.6–1.3)
CREAT SERPL-MCNC: 0.64 MG/DL (ref 0.6–1.3)
CREAT SERPL-MCNC: 0.68 MG/DL (ref 0.6–1.3)
DOP CALC AO PEAK VEL: 1.51 M/S
DOP CALC AO VTI: 33.28 CM
DOP CALC LVOT AREA: 2.83 CM2
DOP CALC LVOT DIAMETER: 1.9 CM
DOP CALC LVOT PEAK VEL VTI: 12.53 CM
DOP CALC LVOT PEAK VEL: 0.57 M/S
DOP CALC LVOT STROKE INDEX: 24.1 ML/M2
DOP CALC LVOT STROKE VOLUME: 35.51 CM3
ERYTHROCYTE [DISTWIDTH] IN BLOOD BY AUTOMATED COUNT: 14.4 % (ref 11.6–15.1)
ERYTHROCYTE [DISTWIDTH] IN BLOOD BY AUTOMATED COUNT: 14.5 % (ref 11.6–15.1)
ERYTHROCYTE [DISTWIDTH] IN BLOOD BY AUTOMATED COUNT: 14.6 % (ref 11.6–15.1)
EST. AVERAGE GLUCOSE BLD GHB EST-MCNC: 111 MG/DL
FLUAV RNA RESP QL NAA+PROBE: NEGATIVE
FLUAV RNA RESP QL NAA+PROBE: NEGATIVE
FLUBV RNA RESP QL NAA+PROBE: NEGATIVE
FLUBV RNA RESP QL NAA+PROBE: NEGATIVE
FRACTIONAL SHORTENING: 31 % (ref 28–44)
GFR SERPL CREATININE-BSD FRML MDRD: 72 ML/MIN/1.73SQ M
GFR SERPL CREATININE-BSD FRML MDRD: 73 ML/MIN/1.73SQ M
GFR SERPL CREATININE-BSD FRML MDRD: 81 ML/MIN/1.73SQ M
GLUCOSE SERPL-MCNC: 102 MG/DL (ref 65–140)
GLUCOSE SERPL-MCNC: 141 MG/DL (ref 65–140)
GLUCOSE SERPL-MCNC: 141 MG/DL (ref 65–140)
GLUCOSE SERPL-MCNC: 97 MG/DL (ref 65–140)
HBA1C MFR BLD: 5.5 %
HCT VFR BLD AUTO: 37 % (ref 34.8–46.1)
HCT VFR BLD AUTO: 39.7 % (ref 34.8–46.1)
HCT VFR BLD AUTO: 41.4 % (ref 34.8–46.1)
HDLC SERPL-MCNC: 82 MG/DL
HGB BLD-MCNC: 12.9 G/DL (ref 11.5–15.4)
HGB BLD-MCNC: 13.4 G/DL (ref 11.5–15.4)
HGB BLD-MCNC: 13.7 G/DL (ref 11.5–15.4)
INR PPP: 1.09 (ref 0.84–1.19)
INTERVENTRICULAR SEPTUM IN DIASTOLE (PARASTERNAL SHORT AXIS VIEW): 1.2 CM
INTERVENTRICULAR SEPTUM: 1.2 CM (ref 0.48–0.9)
LAAS-AP2: 18.8 CM2
LAAS-AP4: 18.7 CM2
LDLC SERPL CALC-MCNC: 95 MG/DL (ref 0–100)
LEFT ATRIUM AREA SYSTOLE SINGLE PLANE A4C: 16.9 CM2
LEFT ATRIUM SIZE: 4 CM
LEFT INTERNAL DIMENSION IN SYSTOLE: 2.7 CM (ref 2.27–3.43)
LEFT VENTRICULAR INTERNAL DIMENSION IN DIASTOLE: 3.9 CM (ref 3.69–5.49)
LEFT VENTRICULAR POSTERIOR WALL IN END DIASTOLE: 1.2 CM (ref 0.47–0.89)
LEFT VENTRICULAR STROKE VOLUME: 38 ML
LVSV (TEICH): 38 ML
MAGNESIUM SERPL-MCNC: 1.9 MG/DL (ref 1.9–2.7)
MCH RBC QN AUTO: 30.7 PG (ref 26.8–34.3)
MCH RBC QN AUTO: 31.3 PG (ref 26.8–34.3)
MCH RBC QN AUTO: 31.4 PG (ref 26.8–34.3)
MCHC RBC AUTO-ENTMCNC: 32.4 G/DL (ref 31.4–37.4)
MCHC RBC AUTO-ENTMCNC: 34.5 G/DL (ref 31.4–37.4)
MCHC RBC AUTO-ENTMCNC: 34.9 G/DL (ref 31.4–37.4)
MCV RBC AUTO: 90 FL (ref 82–98)
MCV RBC AUTO: 91 FL (ref 82–98)
MCV RBC AUTO: 95 FL (ref 82–98)
PHOSPHATE SERPL-MCNC: 3 MG/DL (ref 2.3–4.1)
PLATELET # BLD AUTO: 225 THOUSANDS/UL (ref 149–390)
PLATELET # BLD AUTO: 246 THOUSANDS/UL (ref 149–390)
PLATELET # BLD AUTO: 262 THOUSANDS/UL (ref 149–390)
PMV BLD AUTO: 8.6 FL (ref 8.9–12.7)
PMV BLD AUTO: 8.9 FL (ref 8.9–12.7)
PMV BLD AUTO: 9.2 FL (ref 8.9–12.7)
POTASSIUM SERPL-SCNC: 3.3 MMOL/L (ref 3.5–5.3)
POTASSIUM SERPL-SCNC: 3.5 MMOL/L (ref 3.5–5.3)
POTASSIUM SERPL-SCNC: 3.6 MMOL/L (ref 3.5–5.3)
PROTHROMBIN TIME: 14 SECONDS (ref 11.6–14.5)
QRS AXIS: 55 DEGREES
QRS AXIS: 67 DEGREES
QRSD INTERVAL: 78 MS
QRSD INTERVAL: 80 MS
QT INTERVAL: 330 MS
QT INTERVAL: 386 MS
QTC INTERVAL: 419 MS
QTC INTERVAL: 472 MS
RA PRESSURE ESTIMATED: 5 MMHG
RBC # BLD AUTO: 4.12 MILLION/UL (ref 3.81–5.12)
RBC # BLD AUTO: 4.36 MILLION/UL (ref 3.81–5.12)
RBC # BLD AUTO: 4.37 MILLION/UL (ref 3.81–5.12)
RH BLD: POSITIVE
RH BLD: POSITIVE
RIGHT ATRIUM AREA SYSTOLE A4C: 11.8 CM2
RIGHT VENTRICLE ID DIMENSION: 3.9 CM
RSV RNA RESP QL NAA+PROBE: NEGATIVE
RSV RNA RESP QL NAA+PROBE: NEGATIVE
RV PSP: 43 MMHG
SARS-COV-2 RNA RESP QL NAA+PROBE: NEGATIVE
SARS-COV-2 RNA RESP QL NAA+PROBE: NEGATIVE
SL CV LEFT ATRIUM LENGTH A2C: 5 CM
SL CV LV EF: 60
SL CV PED ECHO LEFT VENTRICLE DIASTOLIC VOLUME (MOD BIPLANE) 2D: 64 ML
SL CV PED ECHO LEFT VENTRICLE SYSTOLIC VOLUME (MOD BIPLANE) 2D: 26 ML
SODIUM SERPL-SCNC: 134 MMOL/L (ref 135–147)
SPECIMEN EXPIRATION DATE: NORMAL
T WAVE AXIS: 19 DEGREES
T WAVE AXIS: 56 DEGREES
TR MAX PG: 38 MMHG
TR PEAK VELOCITY: 3.1 M/S
TRICUSPID VALVE PEAK REGURGITATION VELOCITY: 3.07 M/S
TRIGL SERPL-MCNC: 59 MG/DL
VENTRICULAR RATE: 90 BPM
VENTRICULAR RATE: 97 BPM
WBC # BLD AUTO: 11.98 THOUSAND/UL (ref 4.31–10.16)
WBC # BLD AUTO: 12.4 THOUSAND/UL (ref 4.31–10.16)
WBC # BLD AUTO: 9.93 THOUSAND/UL (ref 4.31–10.16)
Z-SCORE OF ASCENDING AORTA: 3.84
Z-SCORE OF INTERVENTRICULAR SEPTUM IN END DIASTOLE: 4.72
Z-SCORE OF LEFT VENTRICULAR DIMENSION IN END DIASTOLE: -1.43
Z-SCORE OF LEFT VENTRICULAR DIMENSION IN END SYSTOLE: -0.26
Z-SCORE OF LEFT VENTRICULAR POSTERIOR WALL IN END DIASTOLE: 4.83

## 2022-01-01 PROCEDURE — 99291 CRITICAL CARE FIRST HOUR: CPT | Performed by: PHYSICIAN ASSISTANT

## 2022-01-01 PROCEDURE — 93005 ELECTROCARDIOGRAM TRACING: CPT

## 2022-01-01 PROCEDURE — 83036 HEMOGLOBIN GLYCOSYLATED A1C: CPT | Performed by: PHYSICIAN ASSISTANT

## 2022-01-01 PROCEDURE — 20610 DRAIN/INJ JOINT/BURSA W/O US: CPT | Performed by: ORTHOPAEDIC SURGERY

## 2022-01-01 PROCEDURE — 92610 EVALUATE SWALLOWING FUNCTION: CPT

## 2022-01-01 PROCEDURE — 86900 BLOOD TYPING SEROLOGIC ABO: CPT | Performed by: PHYSICIAN ASSISTANT

## 2022-01-01 PROCEDURE — 97163 PT EVAL HIGH COMPLEX 45 MIN: CPT

## 2022-01-01 PROCEDURE — 99233 SBSQ HOSP IP/OBS HIGH 50: CPT

## 2022-01-01 PROCEDURE — 99232 SBSQ HOSP IP/OBS MODERATE 35: CPT | Performed by: INTERNAL MEDICINE

## 2022-01-01 PROCEDURE — 70551 MRI BRAIN STEM W/O DYE: CPT

## 2022-01-01 PROCEDURE — 80048 BASIC METABOLIC PNL TOTAL CA: CPT | Performed by: NURSE PRACTITIONER

## 2022-01-01 PROCEDURE — 99497 ADVNCD CARE PLAN 30 MIN: CPT | Performed by: INTERNAL MEDICINE

## 2022-01-01 PROCEDURE — 92523 SPEECH SOUND LANG COMPREHEN: CPT

## 2022-01-01 PROCEDURE — 99213 OFFICE O/P EST LOW 20 MIN: CPT | Performed by: ORTHOPAEDIC SURGERY

## 2022-01-01 PROCEDURE — 93306 TTE W/DOPPLER COMPLETE: CPT | Performed by: INTERNAL MEDICINE

## 2022-01-01 PROCEDURE — 80048 BASIC METABOLIC PNL TOTAL CA: CPT | Performed by: PHYSICIAN ASSISTANT

## 2022-01-01 PROCEDURE — 84484 ASSAY OF TROPONIN QUANT: CPT | Performed by: PHYSICIAN ASSISTANT

## 2022-01-01 PROCEDURE — 99285 EMERGENCY DEPT VISIT HI MDM: CPT

## 2022-01-01 PROCEDURE — 85610 PROTHROMBIN TIME: CPT | Performed by: PHYSICIAN ASSISTANT

## 2022-01-01 PROCEDURE — 0241U HB NFCT DS VIR RESP RNA 4 TRGT: CPT

## 2022-01-01 PROCEDURE — 83735 ASSAY OF MAGNESIUM: CPT | Performed by: PHYSICIAN ASSISTANT

## 2022-01-01 PROCEDURE — 86850 RBC ANTIBODY SCREEN: CPT | Performed by: PHYSICIAN ASSISTANT

## 2022-01-01 PROCEDURE — 36415 COLL VENOUS BLD VENIPUNCTURE: CPT | Performed by: PHYSICIAN ASSISTANT

## 2022-01-01 PROCEDURE — 82948 REAGENT STRIP/BLOOD GLUCOSE: CPT

## 2022-01-01 PROCEDURE — 85027 COMPLETE CBC AUTOMATED: CPT | Performed by: PHYSICIAN ASSISTANT

## 2022-01-01 PROCEDURE — 70496 CT ANGIOGRAPHY HEAD: CPT

## 2022-01-01 PROCEDURE — 92526 ORAL FUNCTION THERAPY: CPT

## 2022-01-01 PROCEDURE — 99239 HOSP IP/OBS DSCHRG MGMT >30: CPT

## 2022-01-01 PROCEDURE — 93010 ELECTROCARDIOGRAM REPORT: CPT | Performed by: INTERNAL MEDICINE

## 2022-01-01 PROCEDURE — 80061 LIPID PANEL: CPT | Performed by: PHYSICIAN ASSISTANT

## 2022-01-01 PROCEDURE — 70498 CT ANGIOGRAPHY NECK: CPT

## 2022-01-01 PROCEDURE — 99233 SBSQ HOSP IP/OBS HIGH 50: CPT | Performed by: INTERNAL MEDICINE

## 2022-01-01 PROCEDURE — 71260 CT THORAX DX C+: CPT

## 2022-01-01 PROCEDURE — G0427 INPT/ED TELECONSULT70: HCPCS | Performed by: PSYCHIATRY & NEUROLOGY

## 2022-01-01 PROCEDURE — 97167 OT EVAL HIGH COMPLEX 60 MIN: CPT

## 2022-01-01 PROCEDURE — 85730 THROMBOPLASTIN TIME PARTIAL: CPT | Performed by: PHYSICIAN ASSISTANT

## 2022-01-01 PROCEDURE — 86901 BLOOD TYPING SEROLOGIC RH(D): CPT | Performed by: PHYSICIAN ASSISTANT

## 2022-01-01 PROCEDURE — 84100 ASSAY OF PHOSPHORUS: CPT | Performed by: PHYSICIAN ASSISTANT

## 2022-01-01 PROCEDURE — 99285 EMERGENCY DEPT VISIT HI MDM: CPT | Performed by: PHYSICIAN ASSISTANT

## 2022-01-01 PROCEDURE — 93306 TTE W/DOPPLER COMPLETE: CPT

## 2022-01-01 PROCEDURE — 85027 COMPLETE CBC AUTOMATED: CPT | Performed by: NURSE PRACTITIONER

## 2022-01-01 PROCEDURE — 74177 CT ABD & PELVIS W/CONTRAST: CPT

## 2022-01-01 PROCEDURE — 70450 CT HEAD/BRAIN W/O DYE: CPT

## 2022-01-01 RX ORDER — ASPIRIN 81 MG/1
81 TABLET, CHEWABLE ORAL DAILY
Status: DISCONTINUED | OUTPATIENT
Start: 2022-01-01 | End: 2022-01-01

## 2022-01-01 RX ORDER — ATORVASTATIN CALCIUM 40 MG/1
40 TABLET, FILM COATED ORAL
Status: DISCONTINUED | OUTPATIENT
Start: 2022-01-01 | End: 2022-01-01

## 2022-01-01 RX ORDER — HEPARIN SODIUM 5000 [USP'U]/ML
5000 INJECTION, SOLUTION INTRAVENOUS; SUBCUTANEOUS EVERY 8 HOURS SCHEDULED
Status: DISCONTINUED | OUTPATIENT
Start: 2022-01-01 | End: 2022-01-01

## 2022-01-01 RX ORDER — CLOPIDOGREL BISULFATE 75 MG/1
300 TABLET ORAL ONCE
Status: COMPLETED | OUTPATIENT
Start: 2022-01-01 | End: 2022-01-01

## 2022-01-01 RX ORDER — ATORVASTATIN CALCIUM 40 MG/1
40 TABLET, FILM COATED ORAL EVERY EVENING
Status: DISCONTINUED | OUTPATIENT
Start: 2022-01-01 | End: 2022-01-01

## 2022-01-01 RX ORDER — SODIUM CHLORIDE, SODIUM GLUCONATE, SODIUM ACETATE, POTASSIUM CHLORIDE, MAGNESIUM CHLORIDE, SODIUM PHOSPHATE, DIBASIC, AND POTASSIUM PHOSPHATE .53; .5; .37; .037; .03; .012; .00082 G/100ML; G/100ML; G/100ML; G/100ML; G/100ML; G/100ML; G/100ML
50 INJECTION, SOLUTION INTRAVENOUS CONTINUOUS
Status: DISCONTINUED | OUTPATIENT
Start: 2022-01-01 | End: 2022-01-01

## 2022-01-01 RX ORDER — TRIAMCINOLONE ACETONIDE 40 MG/ML
80 INJECTION, SUSPENSION INTRA-ARTICULAR; INTRAMUSCULAR
Status: COMPLETED | OUTPATIENT
Start: 2022-01-01 | End: 2022-01-01

## 2022-01-01 RX ORDER — LORAZEPAM 0.5 MG/1
0.5 TABLET ORAL EVERY 6 HOURS PRN
Refills: 0
Start: 2022-01-01 | End: 2022-04-03

## 2022-01-01 RX ORDER — LABETALOL 20 MG/4 ML (5 MG/ML) INTRAVENOUS SYRINGE
10 4 TIMES DAILY PRN
Status: DISCONTINUED | OUTPATIENT
Start: 2022-01-01 | End: 2022-01-01

## 2022-01-01 RX ORDER — ALPRAZOLAM 0.25 MG/1
0.25 TABLET ORAL
Qty: 30 TABLET | Refills: 1 | Status: SHIPPED | OUTPATIENT
Start: 2022-01-01 | End: 2022-01-01 | Stop reason: HOSPADM

## 2022-01-01 RX ORDER — PANTOPRAZOLE SODIUM 40 MG/1
40 TABLET, DELAYED RELEASE ORAL
Status: DISCONTINUED | OUTPATIENT
Start: 2022-01-01 | End: 2022-01-01

## 2022-01-01 RX ORDER — OXYCODONE HYDROCHLORIDE 5 MG/1
2.5 TABLET ORAL EVERY 2 HOUR PRN
Status: DISCONTINUED | OUTPATIENT
Start: 2022-01-01 | End: 2022-01-01 | Stop reason: HOSPADM

## 2022-01-01 RX ORDER — BUPIVACAINE HYDROCHLORIDE 2.5 MG/ML
4 INJECTION, SOLUTION INFILTRATION; PERINEURAL
Status: COMPLETED | OUTPATIENT
Start: 2022-01-01 | End: 2022-01-01

## 2022-01-01 RX ORDER — POTASSIUM CHLORIDE 20 MEQ/1
40 TABLET, EXTENDED RELEASE ORAL ONCE
Status: COMPLETED | OUTPATIENT
Start: 2022-01-01 | End: 2022-01-01

## 2022-01-01 RX ORDER — HYDRALAZINE HYDROCHLORIDE 20 MG/ML
10 INJECTION INTRAMUSCULAR; INTRAVENOUS EVERY 6 HOURS PRN
Status: DISCONTINUED | OUTPATIENT
Start: 2022-01-01 | End: 2022-01-01

## 2022-01-01 RX ORDER — ONDANSETRON 2 MG/ML
1 INJECTION INTRAMUSCULAR; INTRAVENOUS ONCE
Status: COMPLETED | OUTPATIENT
Start: 2022-01-01 | End: 2022-01-01

## 2022-01-01 RX ORDER — ACETAMINOPHEN 650 MG/1
650 SUPPOSITORY RECTAL EVERY 4 HOURS PRN
Refills: 0
Start: 2022-01-01

## 2022-01-01 RX ORDER — CARVEDILOL 3.12 MG/1
3.12 TABLET ORAL 2 TIMES DAILY WITH MEALS
Status: DISCONTINUED | OUTPATIENT
Start: 2022-01-01 | End: 2022-01-01

## 2022-01-01 RX ORDER — POLYETHYLENE GLYCOL 3350 17 G/17G
17 POWDER, FOR SOLUTION ORAL DAILY
Status: DISCONTINUED | OUTPATIENT
Start: 2022-01-01 | End: 2022-01-01

## 2022-01-01 RX ORDER — TORSEMIDE 20 MG/1
20 TABLET ORAL DAILY
Qty: 30 TABLET | Refills: 0 | Status: SHIPPED | OUTPATIENT
Start: 2022-01-01 | End: 2022-01-01 | Stop reason: HOSPADM

## 2022-01-01 RX ORDER — ONDANSETRON 2 MG/ML
4 INJECTION INTRAMUSCULAR; INTRAVENOUS EVERY 6 HOURS PRN
Status: DISCONTINUED | OUTPATIENT
Start: 2022-01-01 | End: 2022-01-01 | Stop reason: HOSPADM

## 2022-01-01 RX ORDER — ONDANSETRON 2 MG/ML
4 INJECTION INTRAMUSCULAR; INTRAVENOUS EVERY 6 HOURS PRN
Refills: 0
Start: 2022-01-01

## 2022-01-01 RX ORDER — ASPIRIN 325 MG
325 TABLET ORAL ONCE
Status: COMPLETED | OUTPATIENT
Start: 2022-01-01 | End: 2022-01-01

## 2022-01-01 RX ORDER — OXYCODONE HYDROCHLORIDE 5 MG/1
2.5 TABLET ORAL EVERY 2 HOUR PRN
Refills: 0
Start: 2022-01-01 | End: 2022-04-03

## 2022-01-01 RX ORDER — ACETAMINOPHEN 650 MG/1
650 SUPPOSITORY RECTAL EVERY 4 HOURS PRN
Status: DISCONTINUED | OUTPATIENT
Start: 2022-01-01 | End: 2022-01-01 | Stop reason: HOSPADM

## 2022-01-01 RX ORDER — ACETAMINOPHEN 325 MG/1
650 TABLET ORAL EVERY 6 HOURS PRN
Status: DISCONTINUED | OUTPATIENT
Start: 2022-01-01 | End: 2022-01-01

## 2022-01-01 RX ORDER — FLUTICASONE PROPIONATE 50 MCG
2 SPRAY, SUSPENSION (ML) NASAL DAILY
Qty: 1 G | Refills: 3 | Status: SHIPPED | OUTPATIENT
Start: 2022-01-01 | End: 2022-01-01 | Stop reason: HOSPADM

## 2022-01-01 RX ORDER — LABETALOL 20 MG/4 ML (5 MG/ML) INTRAVENOUS SYRINGE
10 ONCE
Status: COMPLETED | OUTPATIENT
Start: 2022-01-01 | End: 2022-01-01

## 2022-01-01 RX ADMIN — POLYETHYLENE GLYCOL 3350 17 G: 17 POWDER, FOR SOLUTION ORAL at 09:11

## 2022-01-01 RX ADMIN — VERAPAMIL HYDROCHLORIDE 180 MG: 180 TABLET ORAL at 17:02

## 2022-01-01 RX ADMIN — ATORVASTATIN CALCIUM 40 MG: 40 TABLET, FILM COATED ORAL at 17:03

## 2022-01-01 RX ADMIN — HEPARIN SODIUM 5000 UNITS: 5000 INJECTION INTRAVENOUS; SUBCUTANEOUS at 17:51

## 2022-01-01 RX ADMIN — LABETALOL HYDROCHLORIDE 10 MG: 5 INJECTION, SOLUTION INTRAVENOUS at 21:04

## 2022-01-01 RX ADMIN — BUPIVACAINE HYDROCHLORIDE 4 ML: 2.5 INJECTION, SOLUTION INFILTRATION; PERINEURAL at 12:03

## 2022-01-01 RX ADMIN — ATORVASTATIN CALCIUM 40 MG: 40 TABLET, FILM COATED ORAL at 20:49

## 2022-01-01 RX ADMIN — POLYETHYLENE GLYCOL 3350 17 G: 17 POWDER, FOR SOLUTION ORAL at 08:51

## 2022-01-01 RX ADMIN — ATORVASTATIN CALCIUM 40 MG: 40 TABLET, FILM COATED ORAL at 17:51

## 2022-01-01 RX ADMIN — HEPARIN SODIUM 5000 UNITS: 5000 INJECTION INTRAVENOUS; SUBCUTANEOUS at 22:27

## 2022-01-01 RX ADMIN — POTASSIUM CHLORIDE 40 MEQ: 1500 TABLET, EXTENDED RELEASE ORAL at 12:00

## 2022-01-01 RX ADMIN — HEPARIN SODIUM 5000 UNITS: 5000 INJECTION INTRAVENOUS; SUBCUTANEOUS at 05:48

## 2022-01-01 RX ADMIN — HEPARIN SODIUM 5000 UNITS: 5000 INJECTION INTRAVENOUS; SUBCUTANEOUS at 21:41

## 2022-01-01 RX ADMIN — CLOPIDOGREL BISULFATE 300 MG: 75 TABLET ORAL at 20:49

## 2022-01-01 RX ADMIN — PANTOPRAZOLE SODIUM 40 MG: 40 TABLET, DELAYED RELEASE ORAL at 06:09

## 2022-01-01 RX ADMIN — TRIAMCINOLONE ACETONIDE 80 MG: 40 INJECTION, SUSPENSION INTRA-ARTICULAR; INTRAMUSCULAR at 12:03

## 2022-01-01 RX ADMIN — CARVEDILOL 3.12 MG: 3.12 TABLET, FILM COATED ORAL at 08:57

## 2022-01-01 RX ADMIN — HEPARIN SODIUM 5000 UNITS: 5000 INJECTION INTRAVENOUS; SUBCUTANEOUS at 14:14

## 2022-01-01 RX ADMIN — PANTOPRAZOLE SODIUM 40 MG: 40 TABLET, DELAYED RELEASE ORAL at 05:30

## 2022-01-01 RX ADMIN — HEPARIN SODIUM 5000 UNITS: 5000 INJECTION INTRAVENOUS; SUBCUTANEOUS at 15:35

## 2022-01-01 RX ADMIN — ASPIRIN 81 MG CHEWABLE TABLET 81 MG: 81 TABLET CHEWABLE at 08:51

## 2022-01-01 RX ADMIN — ASPIRIN 81 MG CHEWABLE TABLET 81 MG: 81 TABLET CHEWABLE at 09:51

## 2022-01-01 RX ADMIN — HEPARIN SODIUM 5000 UNITS: 5000 INJECTION INTRAVENOUS; SUBCUTANEOUS at 13:57

## 2022-01-01 RX ADMIN — HEPARIN SODIUM 5000 UNITS: 5000 INJECTION INTRAVENOUS; SUBCUTANEOUS at 21:35

## 2022-01-01 RX ADMIN — HEPARIN SODIUM 5000 UNITS: 5000 INJECTION INTRAVENOUS; SUBCUTANEOUS at 21:23

## 2022-01-01 RX ADMIN — ASPIRIN 81 MG CHEWABLE TABLET 81 MG: 81 TABLET CHEWABLE at 08:57

## 2022-01-01 RX ADMIN — ATORVASTATIN CALCIUM 40 MG: 40 TABLET, FILM COATED ORAL at 22:11

## 2022-01-01 RX ADMIN — ASPIRIN 81 MG CHEWABLE TABLET 81 MG: 81 TABLET CHEWABLE at 09:11

## 2022-01-01 RX ADMIN — CARVEDILOL 3.12 MG: 3.12 TABLET, FILM COATED ORAL at 18:24

## 2022-01-01 RX ADMIN — HEPARIN SODIUM 5000 UNITS: 5000 INJECTION INTRAVENOUS; SUBCUTANEOUS at 14:35

## 2022-01-01 RX ADMIN — VERAPAMIL HYDROCHLORIDE 180 MG: 180 TABLET ORAL at 08:57

## 2022-01-01 RX ADMIN — HEPARIN SODIUM 5000 UNITS: 5000 INJECTION INTRAVENOUS; SUBCUTANEOUS at 09:27

## 2022-01-01 RX ADMIN — IOHEXOL 10 ML: 350 INJECTION, SOLUTION INTRAVENOUS at 20:07

## 2022-01-01 RX ADMIN — HYDRALAZINE HYDROCHLORIDE 10 MG: 20 INJECTION INTRAMUSCULAR; INTRAVENOUS at 23:29

## 2022-01-01 RX ADMIN — PANTOPRAZOLE SODIUM 40 MG: 40 TABLET, DELAYED RELEASE ORAL at 05:49

## 2022-01-01 RX ADMIN — HEPARIN SODIUM 5000 UNITS: 5000 INJECTION INTRAVENOUS; SUBCUTANEOUS at 23:44

## 2022-01-01 RX ADMIN — ATORVASTATIN CALCIUM 40 MG: 40 TABLET, FILM COATED ORAL at 17:38

## 2022-01-01 RX ADMIN — CARVEDILOL 3.12 MG: 3.12 TABLET, FILM COATED ORAL at 06:40

## 2022-01-01 RX ADMIN — VERAPAMIL HYDROCHLORIDE 180 MG: 180 TABLET ORAL at 08:51

## 2022-01-01 RX ADMIN — SODIUM CHLORIDE, SODIUM GLUCONATE, SODIUM ACETATE, POTASSIUM CHLORIDE, MAGNESIUM CHLORIDE, SODIUM PHOSPHATE, DIBASIC, AND POTASSIUM PHOSPHATE 50 ML/HR: .53; .5; .37; .037; .03; .012; .00082 INJECTION, SOLUTION INTRAVENOUS at 22:08

## 2022-01-01 RX ADMIN — LABETALOL HYDROCHLORIDE 10 MG: 5 INJECTION, SOLUTION INTRAVENOUS at 18:24

## 2022-01-01 RX ADMIN — PANTOPRAZOLE SODIUM 40 MG: 40 TABLET, DELAYED RELEASE ORAL at 06:40

## 2022-01-01 RX ADMIN — HEPARIN SODIUM 5000 UNITS: 5000 INJECTION INTRAVENOUS; SUBCUTANEOUS at 05:05

## 2022-01-01 RX ADMIN — ASPIRIN 325 MG: 325 TABLET ORAL at 20:49

## 2022-01-01 RX ADMIN — ATORVASTATIN CALCIUM 40 MG: 40 TABLET, FILM COATED ORAL at 17:02

## 2022-01-01 RX ADMIN — HEPARIN SODIUM 5000 UNITS: 5000 INJECTION INTRAVENOUS; SUBCUTANEOUS at 05:30

## 2022-01-01 RX ADMIN — PANTOPRAZOLE SODIUM 40 MG: 40 TABLET, DELAYED RELEASE ORAL at 05:05

## 2022-01-01 RX ADMIN — CARVEDILOL 3.12 MG: 3.12 TABLET, FILM COATED ORAL at 15:35

## 2022-01-01 RX ADMIN — IOHEXOL 100 ML: 350 INJECTION, SOLUTION INTRAVENOUS at 20:05

## 2022-01-24 NOTE — PROGRESS NOTES
Assessment:  1  Primary osteoarthritis of both knees         Plan:  The patient was provided with bilateral knee steroid injections  The patient tolerated the procedure well  The patient should follow up in 10 weeks  To do next visit:  Return in about 10 weeks (around 4/4/2022)  The above stated was discussed in layman's terms and the patient expressed understanding  All questions were answered to the patient's satisfaction  Under aseptic technique, both knees were injected with Kenalog and Marcaine  She tolerated procedures quite well  Return back in 3 months for re-evaluation  If her condition changes, she will not hesitate to let us know  Physical examination shows diffuse medial and lateral joint tenderness with patellofemoral crepitation      Scribe Attestation    I,:  Daquan Chávez am acting as a scribe while in the presence of the attending physician :       I,:  Brooke Nelson, DO personally performed the services described in this documentation    as scribed in my presence :             Subjective:   Dhruv Gallegos is a 80 y o  female who presents for follow up of bilateral knees  She is s/p left knee steroid injection with benefit, 10/15/2021  Today she complains of bilateral generalized knee pain  Prolonged weight bearing and repetitive bending aggravates while rest alleviates          Review of systems negative unless otherwise specified in HPI    Past Medical History:   Diagnosis Date    Benign essential hypertension     Cancer ([address] )     rectal and sigmoid colon     Carcinoma in situ     Carotid artery occlusion     Disorder of rectum and anus     Disorder of skin     Diverticular disease of colon     Edema     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)     Essential hypertension     Gastroesophageal reflux disease     Herpes zoster without complication     Hyperlipidemia     Hypokalemia     Idiopathic peripheral neuropathy     Iron deficiency anemia     Localized, primary osteoarthritis     Lower leg    Malaise and fatigue     Mixed hyperlipidemia     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     Post-herpetic polyneuropathy     Unspecified osteoarthritis, unspecified site     Vitamin D deficiency        Past Surgical History:   Procedure Laterality Date    COLON SURGERY      Colon resection with open surgery     COLONOSCOPY  2013    Positive for tumor     HYSTERECTOMY      OTHER SURGICAL HISTORY      fibrous tumor    RECTAL SURGERY  09/2013    tumor       Family History   Problem Relation Age of Onset    Hypertension Father     Heart failure Father        Social History     Occupational History    Occupation: Retired    Tobacco Use    Smoking status: Never Smoker    Smokeless tobacco: Never Used   Vaping Use    Vaping Use: Never used   Substance and Sexual Activity    Alcohol use: Not Currently     Comment: Denies alcohol use - As per [employer] Drug use: Never     Comment: Denies drug use - As per [employer]     Sexual activity: Not on file         Current Outpatient Medications:     ALPRAZolam (XANAX) 0 25 mg tablet, Take 1 tablet (0 25 mg total) by mouth daily at bedtime as needed for anxiety, Disp: 30 tablet, Rfl: 1    aspirin 81 mg chewable tablet, Chew 81 mg daily, Disp: , Rfl:     fexofenadine (Allegra Allergy) 180 MG tablet, Take 180 mg by mouth daily, Disp: , Rfl:     naloxone (NARCAN) 4 mg/0 1 mL nasal spray, Administer 1 spray into a nostril   If breathing does not return to normal or if breathing difficulty resumes after 2-3 minutes, give another dose in the other nostril using a new spray , Disp: 1 each, Rfl: 1    omeprazole (PriLOSEC) 20 mg delayed release capsule, Take 1 capsule (20 mg total) by mouth daily, Disp: 30 capsule, Rfl: 4    oxyCODONE-acetaminophen (PERCOCET) 5-325 mg per tablet, Take 1 tablet by mouth 2 (two) times a day as needed for moderate pain Max Daily Amount: 2 tablets, Disp: 30 tablet, Rfl: 0    Polyethylene Glycol 3350 (MIRALAX PO), Take by mouth, Disp: , Rfl:     potassium chloride (K-DUR,KLOR-CON) 10 mEq tablet, Take 1 tablet (10 mEq total) by mouth daily, Disp: 90 tablet, Rfl: 2    pravastatin (PRAVACHOL) 20 mg tablet, Take 1 tablet (20 mg total) by mouth daily, Disp: 90 tablet, Rfl: 3    verapamil (CALAN-SR) 180 mg CR tablet, Take 1 tablet (180 mg total) by mouth daily at bedtime, Disp: 30 tablet, Rfl: 6    Diclofenac Sodium (VOLTAREN) 1 %, Apply 2 g topically 4 (four) times a day (Patient not taking: Reported on 1/24/2022 ), Disp: 100 g, Rfl: 1    fluticasone (FLONASE) 50 mcg/act nasal spray, 2 sprays into each nostril daily, Disp: 1 g, Rfl: 3    gabapentin (NEURONTIN) 800 mg tablet, Take by mouth (Patient not taking: Reported on 11/22/2021 ), Disp: , Rfl:     Current Facility-Administered Medications:     bupivacaine (MARCAINE) 0 25 % injection 4 mL, 4 mL, Injection, , Gonsalo Guerin, DO, 4 mL at 10/15/21 1139    methylPREDNISolone acetate (DEPO-MEDROL) injection 2 mL, 2 mL, Intra-articular, , Gonsalo Guerin, DO, 2 mL at 10/15/21 1139    Allergies   Allergen Reactions    Cephalosporins Other (See Comments)     Per pharmacy          There were no vitals filed for this visit  Objective:  Physical exam  · General: Awake, Alert, Oriented  · Eyes: Pupils equal, round and reactive to light  · Heart: regular rate and rhythm  · Lungs: No audible wheezing  · Abdomen: soft                    Ortho Exam  Bilateral knees:  TTP over medial joint line  No erythema or ecchymosis  No effusion or swelling  Normal strength  Good ROM with crepitus   Calf compartments soft and supple  Sensation intact  Toes are warm sensate and mobile        Diagnostics, reviewed and taken today if performed as documented:    None performed     Procedures, if performed today:    Large joint arthrocentesis: R knee  Universal Protocol:  Consent: Verbal consent obtained    Risks and benefits: risks, benefits and alternatives were discussed  Consent given by: patient  Time out: Immediately prior to procedure a "time out" was called to verify the correct patient, procedure, equipment, support staff and site/side marked as required  Timeout called at: 1/24/2022 12:01 PM   Patient understanding: patient states understanding of the procedure being performed  Site marked: the operative site was marked  Patient identity confirmed: verbally with patient    Supporting Documentation  Indications: pain   Procedure Details  Location: knee - R knee  Preparation: Patient was prepped and draped in the usual sterile fashion  Needle size: 22 G  Ultrasound guidance: no  Approach: anterolateral  Medications administered: 4 mL bupivacaine 0 25 %; 80 mg triamcinolone acetonide 40 mg/mL    Patient tolerance: patient tolerated the procedure well with no immediate complications  Dressing:  Sterile dressing applied    Large joint arthrocentesis: L knee  Universal Protocol:  Consent: Verbal consent obtained  Risks and benefits: risks, benefits and alternatives were discussed  Consent given by: patient  Time out: Immediately prior to procedure a "time out" was called to verify the correct patient, procedure, equipment, support staff and site/side marked as required    Timeout called at: 1/24/2022 12:01 PM   Patient understanding: patient states understanding of the procedure being performed  Site marked: the operative site was marked  Patient identity confirmed: verbally with patient    Supporting Documentation  Indications: pain   Procedure Details  Location: knee - L knee  Preparation: Patient was prepped and draped in the usual sterile fashion  Needle size: 22 G  Ultrasound guidance: no  Approach: anterolateral  Medications administered: 4 mL bupivacaine 0 25 %; 80 mg triamcinolone acetonide 40 mg/mL    Patient tolerance: patient tolerated the procedure well with no immediate complications  Dressing:  Sterile dressing applied               Portions of the record may have been created with voice recognition software  Occasional wrong word or "sound a like" substitutions may have occurred due to the inherent limitations of voice recognition software  Read the chart carefully and recognize, using context, where substitutions have occurred

## 2022-03-02 NOTE — TELEPHONE ENCOUNTER
Is she short of breath?   She may be devloping heart failure  I can send in diuretics but if she is short of breath she should go to the hospital

## 2022-03-02 NOTE — TELEPHONE ENCOUNTER
Patient's daughter called stating patient is having increased swelling in both legs from the calf to ankles  Daughter asked if there is anything that could be done to decrease the swelling  Daughter stated she is having patient elevate her legs but legs are still swollen

## 2022-03-03 NOTE — TELEPHONE ENCOUNTER
Spoke with patient's daughter she stated patient has no SOB and the swelling is down and is a lot better legs aren't as hard  Patient's daughter asked after she gets the dieretic and swelling goes down should she stop it and restart if swelling goes back up

## 2022-03-04 NOTE — TELEPHONE ENCOUNTER
I had sent in diuretics for her to use until the edema was gone    Use them   If she is dyspneic take to eD

## 2022-03-17 PROBLEM — I65.22 ICAO (INTERNAL CAROTID ARTERY OCCLUSION), LEFT: Status: ACTIVE | Noted: 2022-01-01

## 2022-03-17 PROBLEM — N18.31 STAGE 3A CHRONIC KIDNEY DISEASE (HCC): Status: RESOLVED | Noted: 2021-01-01 | Resolved: 2022-01-01

## 2022-03-17 PROBLEM — I48.91 A-FIB (HCC): Status: ACTIVE | Noted: 2022-01-01

## 2022-03-17 PROBLEM — K21.9 GERD (GASTROESOPHAGEAL REFLUX DISEASE): Status: ACTIVE | Noted: 2022-01-01

## 2022-03-17 NOTE — ED PROVIDER NOTES
History  Chief Complaint   Patient presents with    CVA/TIA-like Symptoms     8 year old female presents to the emergency department with concern for altered mental status and right-sided paralysis  Upon arrival she appears confused and does not seem to be following commands appropriately  Attending physician notified and is at bedside during evaluation  Is reported the patient fell repeatedly prior to coming to the emergency department  Patient was called a trauma/stroke evaluation prehospital  EMS reported on arrival that the symptoms of ams and r sided weakness developed before the falls  After discussions with the patient's daughter while the patient is being CT scanned it is reported the symptoms started at approximately noon to 1:00 p m   It is reported the patient began not using her right arm and was having difficulty ambulating citing weakness of the right leg  The symptoms progressed throughout the day culminating in multiple falls prior to EMS being called  Patient does take baby aspirin  Prior to Admission Medications   Prescriptions Last Dose Informant Patient Reported? Taking?    ALPRAZolam (XANAX) 0 25 mg tablet 3/16/2022 at Unknown time  No Yes   Sig: Take 1 tablet (0 25 mg total) by mouth daily at bedtime as needed for anxiety   Diclofenac Sodium (VOLTAREN) 1 % Unknown at Unknown time  No No   Sig: Apply 2 g topically 4 (four) times a day   Patient not taking: Reported on 2022    Polyethylene Glycol 3350 (MIRALAX PO) 3/17/2022 at Unknown time Care Giver Yes Yes   Sig: Take by mouth   aspirin 81 mg chewable tablet 3/17/2022 at Unknown time Care Giver Yes Yes   Sig: Chew 81 mg daily   fexofenadine (Allegra Allergy) 180 MG tablet 3/16/2022 at Unknown time Self Yes Yes   Sig: Take 180 mg by mouth daily   fluticasone (FLONASE) 50 mcg/act nasal spray   No No   Si sprays into each nostril daily   naloxone (NARCAN) 4 mg/0 1 mL nasal spray Unknown at Unknown time Self No No Sig: Administer 1 spray into a nostril  If breathing does not return to normal or if breathing difficulty resumes after 2-3 minutes, give another dose in the other nostril using a new spray     omeprazole (PriLOSEC) 20 mg delayed release capsule Past Week at Unknown time Self No Yes   Sig: Take 1 capsule (20 mg total) by mouth daily   oxyCODONE-acetaminophen (PERCOCET) 5-325 mg per tablet 3/16/2022 at Unknown time  No Yes   Sig: Take 1 tablet by mouth 2 (two) times a day as needed for moderate pain Max Daily Amount: 2 tablets   potassium chloride (K-DUR,KLOR-CON) 10 mEq tablet 3/17/2022 at Unknown time  No Yes   Sig: Take 1 tablet (10 mEq total) by mouth daily   pravastatin (PRAVACHOL) 20 mg tablet 3/17/2022 at Unknown time  No Yes   Sig: Take 1 tablet (20 mg total) by mouth daily   torsemide (DEMADEX) 20 mg tablet Unknown at Unknown time  No No   Sig: Take 1 tablet (20 mg total) by mouth daily   verapamil (CALAN-SR) 180 mg CR tablet 3/17/2022 at Unknown time  No Yes   Sig: Take 1 tablet (180 mg total) by mouth daily at bedtime      Facility-Administered Medications Last Administration Doses Remaining   bupivacaine (MARCAINE) 0 25 % injection 4 mL 10/15/2021 11:39 AM    methylPREDNISolone acetate (DEPO-MEDROL) injection 2 mL 10/15/2021 11:39 AM           Past Medical History:   Diagnosis Date    Benign essential hypertension     Cancer ([facility] [address] )     rectal and sigmoid colon     Carcinoma in situ     Carotid artery occlusion     Disorder of rectum and anus     Disorder of skin     Diverticular disease of colon     Edema     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)     Essential hypertension     Gastroesophageal reflux disease     Herpes zoster without complication     Hyperlipidemia     Hypokalemia     Idiopathic peripheral neuropathy     Iron deficiency anemia     Localized, primary osteoarthritis     Lower leg    Malaise and fatigue     Mixed hyperlipidemia     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     Post-herpetic polyneuropathy     Unspecified osteoarthritis, unspecified site     Vitamin D deficiency        Past Surgical History:   Procedure Laterality Date    COLON SURGERY      Colon resection with open surgery     COLONOSCOPY  2013    Positive for tumor     HYSTERECTOMY      OTHER SURGICAL HISTORY      fibrous tumor    RECTAL SURGERY  09/2013    tumor       Family History   Problem Relation Age of Onset    Hypertension Father     Heart failure Father      I have reviewed and agree with the history as documented  E-Cigarette/Vaping    E-Cigarette Use Never User      E-Cigarette/Vaping Substances     Social History     Tobacco Use    Smoking status: Never Smoker    Smokeless tobacco: Never Used   Vaping Use    Vaping Use: Never used   Substance Use Topics    Alcohol use: Not Currently     Comment: Denies alcohol use - As per [employer]     Drug use: Never     Comment: Denies drug use - As per [employer]        Review of Systems   Unable to perform ROS: Mental status change       Physical Exam  Physical Exam  Vitals and nursing note reviewed  Constitutional:       General: She is in acute distress  Appearance: She is well-developed  She is ill-appearing  She is not diaphoretic  HENT:      Head: Normocephalic and atraumatic  Right Ear: External ear normal       Left Ear: External ear normal       Nose: Nose normal    Eyes:      Conjunctiva/sclera: Conjunctivae normal       Pupils: Pupils are equal, round, and reactive to light  Cardiovascular:      Rate and Rhythm: Normal rate and regular rhythm  Heart sounds: Normal heart sounds  No murmur heard  No friction rub  No gallop  Pulmonary:      Effort: Pulmonary effort is normal  No respiratory distress  Breath sounds: Normal breath sounds  No stridor  No wheezing or rales  Abdominal:      General: Bowel sounds are normal  There is no distension        Palpations: Abdomen is soft  Tenderness: There is no abdominal tenderness  There is no guarding  Comments: Prior midline surgical incision noted  No obvious external signs of trauma  Musculoskeletal:         General: No tenderness  Normal range of motion  Cervical back: Normal range of motion  Comments: No external signs of musculoskeletal trauma  Profound right-sided weakness and paralysis   Skin:     General: Skin is warm  Capillary Refill: Capillary refill takes less than 2 seconds  Neurological:      GCS: GCS eye subscore is 4  GCS verbal subscore is 2  GCS motor subscore is 5  Motor: Weakness present  Comments: Profound weakness in flaccid paralysis of the right side of the body  Garbled speech           Vital Signs  ED Triage Vitals   Temperature Pulse Respirations Blood Pressure SpO2   03/17/22 1936 03/17/22 1930 03/17/22 1930 03/17/22 1930 03/17/22 1930   97 6 °F (36 4 °C) 83 18 (!) 183/82 91 %      Temp Source Heart Rate Source Patient Position - Orthostatic VS BP Location FiO2 (%)   03/17/22 1936 03/17/22 1936 03/17/22 1945 03/17/22 2054 --   Tympanic Monitor Lying Right arm       Pain Score       03/17/22 2054       No Pain           Vitals:    03/18/22 0500 03/18/22 0600 03/18/22 0700 03/18/22 0820   BP: (!) 192/86 (!) 186/86 (!) 190/87 (!) 186/86   Pulse: 103 96 95 89   Patient Position - Orthostatic VS:             Visual Acuity  Visual Acuity      Most Recent Value   L Pupil Size (mm) 4   R Pupil Size (mm) 3   L Pupil Shape Round   R Pupil Shape Round          ED Medications  Medications   ondansetron (ZOFRAN) injection 4 mg (has no administration in time range)   pantoprazole (PROTONIX) EC tablet 40 mg (40 mg Oral Given 3/18/22 0609)   polyethylene glycol (MIRALAX) packet 17 g (17 g Oral Given 3/18/22 0911)   atorvastatin (LIPITOR) tablet 40 mg (40 mg Oral Given 3/17/22 2211)   aspirin chewable tablet 81 mg (81 mg Oral Given 3/18/22 0911)   heparin (porcine) subcutaneous injection 5,000 Units (has no administration in time range)   acetaminophen (TYLENOL) tablet 650 mg (has no administration in time range)   multi-electrolyte (PLASMALYTE-A/ISOLYTE-S PH 7 4) IV solution (50 mL/hr Intravenous Restarted 3/17/22 2330)   potassium chloride (K-DUR,KLOR-CON) CR tablet 40 mEq (has no administration in time range)   ondansetron (FOR EMS ONLY) (ZOFRAN) 4 mg/2 mL injection 4 mg (0 mg Does not apply Given to EMS 3/1933)   iohexol (OMNIPAQUE) 350 MG/ML injection (SINGLE-DOSE) 100 mL (100 mL Intravenous Given 3/17/22 2005)   iohexol (OMNIPAQUE) 350 MG/ML injection (SINGLE-DOSE) 10 mL (10 mL Intravenous Given 3/17/22 2007)   aspirin tablet 325 mg (325 mg Oral Given 3/17/22 2049)   clopidogrel (PLAVIX) tablet 300 mg (300 mg Oral Given 3/17/22 2049)       Diagnostic Studies  Results Reviewed     Procedure Component Value Units Date/Time    HS Troponin I 2hr [[id number]]  (Normal) Collected: 03/17/22 2203    Lab Status: Final result Specimen: Blood from Arm, Right Updated: 03/17/22 2232     hs TnI 2hr 28 ng/L      Delta 2hr hsTnI 3 ng/L     Protime-INR [[id number]]  (Normal) Collected: 03/17/22 1935    Lab Status: Final result Specimen: Blood from Arm, Right Updated: 03/17/22 2043     Protime 14 0 seconds      INR 1 09    APTT [[id number]]  (Normal) Collected: 03/17/22 1935    Lab Status: Final result Specimen: Blood from Arm, Right Updated: 03/17/22 2043     PTT 29 seconds     COVID/FLU/RSV - 2 hour TAT [[id number]]  (Normal) Collected: 03/17/22 1935    Lab Status: Final result Specimen: Nares from Nose Updated: 03/17/22 2023     SARS-CoV-2 Negative     INFLUENZA A PCR Negative     INFLUENZA B PCR Negative     RSV PCR Negative    Narrative:      FOR PEDIATRIC PATIENTS - copy/paste COVID Guidelines URL to browser: [email or web address]    SARS-CoV-2 assay is a Nucleic Acid Amplification assay intended for the  qualitative detection of nucleic acid from SARS-CoV-2 in nasopharyngeal  swabs  Results are for the presumptive identification of SARS-CoV-2 RNA  Positive results are indicative of infection with SARS-CoV-2, the virus  causing COVID-19, but do not rule out bacterial infection or co-infection  with other viruses  Laboratories within the [place] and its  territories are required to report all positive results to the appropriate  public health authorities  Negative results do not preclude SARS-CoV-2  infection and should not be used as the sole basis for treatment or other  patient management decisions  Negative results must be combined with  clinical observations, patient history, and epidemiological information  This test has not been FDA cleared or approved  This test has been authorized by FDA under an Emergency Use Authorization  (EUA)  This test is only authorized for the duration of time the  declaration that circumstances exist justifying the authorization of the  emergency use of an in vitro diagnostic tests for detection of SARS-CoV-2  virus and/or diagnosis of COVID-19 infection under section 564(b)(1) of  the Act, 21 U  S C  169AHE-5(I)(0), unless the authorization is terminated  or revoked sooner  The test has been validated but independent review by FDA  and CLIA is pending  Test performed using [employer] GeneXpert: This RT-PCR assay targets N2,  a region unique to SARS-CoV-2  A conserved region in the E-gene was chosen  for pan-Sarbecovirus detection which includes SARS-CoV-2      HS Troponin 0hr (reflex protocol) [[id number]]  (Normal) Collected: 03/17/22 1935    Lab Status: Final result Specimen: Blood from Arm, Right Updated: 03/17/22 2007     hs TnI 0hr 25 ng/L     Basic metabolic panel [[id number]]  (Abnormal) Collected: 03/17/22 1935    Lab Status: Final result Specimen: Blood from Arm, Right Updated: 03/17/22 2001     Sodium 134 mmol/L      Potassium 3 6 mmol/L      Chloride 97 mmol/L      CO2 27 mmol/L      ANION GAP 10 mmol/L      BUN 20 mg/dL      Creatinine 0 68 mg/dL      Glucose 141 mg/dL      Calcium 9 1 mg/dL      eGFR 72 ml/min/1 73sq m     Narrative:      [place] guidelines for Chronic Kidney Disease (CKD):     Stage 1 with normal or high GFR (GFR > 90 mL/min/1 73 square meters)    Stage 2 Mild CKD (GFR = 60-89 mL/min/1 73 square meters)    Stage 3A Moderate CKD (GFR = 45-59 mL/min/1 73 square meters)    Stage 3B Moderate CKD (GFR = 30-44 mL/min/1 73 square meters)    Stage 4 Severe CKD (GFR = 15-29 mL/min/1 73 square meters)    Stage 5 End Stage CKD (GFR <15 mL/min/1 73 square meters)  Note: GFR calculation is accurate only with a steady state creatinine    CBC and Platelet [[id number]]  (Abnormal) Collected: 03/17/22 1935    Lab Status: Final result Specimen: Blood from Arm, Right Updated: 03/17/22 1943     WBC 12 40 Thousand/uL      RBC 4 37 Million/uL      Hemoglobin 13 7 g/dL      Hematocrit 39 7 %      MCV 91 fL      MCH 31 4 pg      MCHC 34 5 g/dL      RDW 14 5 %      Platelets 331 Thousands/uL      MPV 8 6 fL     Fingerstick Glucose (POCT) [[id number]]  (Abnormal) Collected: 03/17/22 1932    Lab Status: Final result Updated: 03/17/22 1934     POC Glucose 141 mg/dl                  MRI brain wo contrast   Final Result by Luis Silvestre DO (03/18 5494)      There are multiple small acute infarcts scattered within the brain parenchyma involving multiple different vascular territories without mass effect or hemorrhagic transformation, all less than 1 cm in size  Incomplete flow void of the left distal cervical and intracranial internal carotid artery consistent with the occlusion seen on CT angiography  Moderate chronic microangiopathic change within the brain parenchyma  This examination was marked "immediate notification" in Epic in order to begin the standard process by which the radiology reading room liaison alerts the referring practitioner  Workstation performed: [id number]         TRAUMA - CT chest abdomen pelvis w contrast   Final Result by Diana Mathew MD (03/17 2100)      No acute posttraumatic CT findings  Cardiomegaly  Cholelithiasis  I personally discussed this study with Rodger Medina on 3/17/2022 at 8:16 PM                Workstation performed: [id number]         CTA stroke alert (head/neck)   Final Result by Diana Mathew MD (03/17 2027)      Occluded left cervical ICA and intracranial ICA  Reconstitution in the left supraclinoid /ICA terminus  Diminutive/decreased flow left MCA and decreased flow distal branches  I personally discussed this study with neurologist on 3/17/2022 at 7:57 PM                         Workstation performed: [id number]         CT stroke alert brain   Final Result by Diana Mathew MD (03/17 2002)      No acute intracranial hemorrhage  Right frontal centrum semiovale age-indeterminate ischemia/infarct  Microangiopathic changes  Right maxillary sinus polyp          I personally discussed this study with neurologist on 3/17/2022 at 7:57 PM                 Workstation performed: [id number]         CT head wo contrast    (Results Pending)              Procedures  Procedures         ED Course  ED Course as of 03/18/22 1019   Thu Mar 17, 2022   2026 Patient is awake alert oriented  Patient verbalized that she would like to be considered for endovascular therapy  This was relayed to Dr Dena Funes who stated that he would discuss with Interventional Neurology   2038 Maintain pressure 180-200 per Dr Dena Funes consider ICU placement  2045 Per Dr Dena Funes and Dr Reji Mccloud, pt is not a candidate for endovascular therapy  Wants maximal medical therapy  Recommendations placed at the request of Dr Dena Funes   2048 Patient swallowing pills without difficulty and verbalizes consent for aggressive medical care including central line placement if necessary                    Stroke Assessment     Row Name 03/17/22 1935             NIH Stroke Scale    Interval Baseline      Level of Consciousness (1a ) 0      LOC Questions (1b ) 0      LOC Commands (1c ) 0      Best Gaze (2 ) 1      Visual (3 ) 0      Facial Palsy (4 ) 2      Motor Arm, Left (5a ) 0      Motor Arm, Right (5b ) 4      Motor Leg, Left (6a ) 0      Motor Leg, Right (6b ) 4      Limb Ataxia (7 ) 0      Sensory (8 ) 0      Best Language (9 ) 0      Dysarthria (10 ) 1      Extinction and Inattention (11 ) (Formerly Neglect) 0      Total 12                Most Recent Value   TPA Decision Options    TPA Decision Patient not a TPA candidate  Patient is not a candidate options Unclear time of onset outside appropriate time window  MDM  Number of Diagnoses or Management Options  ICAO (internal carotid artery occlusion), left  Symptoms of cerebrovascular accident (CVA)  Diagnosis management comments: Left internal carotid artery occlusion causing the patient's right decided paralysis and weakness  Patient did improve while in the emergency department when being laid flat  Patient verbalizes that she is in the emergency department and knows the day and president  As the patient is awake alert oriented she states that she would like to pursue maximal medical care up to and including central line placement necessary  She does express that she would not like to be intubated  Patient is uncertain about whether not she would have CPR performed  Patient to be admitted to the ICU for further management  Dr Donny Louis of neurology's recommending aggressive medical care and blood pressure management above 180 and to use pressors if necessary  D/W Dr Margaret Soni of [address] [place] [address]  Pt accepted to ICU  Attending physician assisted with examination of patient         Disposition  Final diagnoses:   Symptoms of cerebrovascular accident (CVA)   ICAO (internal carotid artery occlusion), left     Time reflects when diagnosis was documented in both MDM as applicable and the Disposition within this note     Time User Action Codes Description Comment    3/17/2022  7:30 PM Yumiko Frame Add [R09 89] Symptoms of cerebrovascular accident (CVA)     3/17/2022  8:58 PM Yumiko Frame Add [I65 22] ICAO (internal carotid artery occlusion), left     3/17/2022  9:39 PM Cristin Salt Modify [I65 22] ICAO (internal carotid artery occlusion), left       ED Disposition     ED Disposition Condition Date/Time Comment    Admit Stable Thu Mar 17, 2022  8:59 PM Case was discussed with Dr Rocael Boss and the patient's admission status was agreed to be Admission Status: inpatient status to the service of Dr Rocael Boss   Follow-up Information    None         Current Discharge Medication List      CONTINUE these medications which have NOT CHANGED    Details   ALPRAZolam (XANAX) 0 25 mg tablet Take 1 tablet (0 25 mg total) by mouth daily at bedtime as needed for anxiety  Qty: 30 tablet, Refills: 1    Associated Diagnoses: Essential hypertension; Sensorineural hearing loss (SNHL) of both ears; Diuretic-induced hypokalemia;  Anxiety      aspirin 81 mg chewable tablet Chew 81 mg daily      fexofenadine (Allegra Allergy) 180 MG tablet Take 180 mg by mouth daily      omeprazole (PriLOSEC) 20 mg delayed release capsule Take 1 capsule (20 mg total) by mouth daily  Qty: 30 capsule, Refills: 4    Associated Diagnoses: Gastroesophageal reflux disease without esophagitis      oxyCODONE-acetaminophen (PERCOCET) 5-325 mg per tablet Take 1 tablet by mouth 2 (two) times a day as needed for moderate pain Max Daily Amount: 2 tablets  Qty: 30 tablet, Refills: 0    Associated Diagnoses: Other chronic pain      Polyethylene Glycol 3350 (MIRALAX PO) Take by mouth      potassium chloride (K-DUR,KLOR-CON) 10 mEq tablet Take 1 tablet (10 mEq total) by mouth daily  Qty: 90 tablet, Refills: 2    Associated Diagnoses: Hyperkalemia      pravastatin (PRAVACHOL) 20 mg tablet Take 1 tablet (20 mg total) by mouth daily  Qty: 90 tablet, Refills: 3    Associated Diagnoses: Pure hypercholesterolemia; Sensorineural hearing loss (SNHL) of both ears; Essential hypertension; Diuretic-induced hypokalemia      verapamil (CALAN-SR) 180 mg CR tablet Take 1 tablet (180 mg total) by mouth daily at bedtime  Qty: 30 tablet, Refills: 6    Associated Diagnoses: Essential hypertension; Sensorineural hearing loss (SNHL) of both ears; Diuretic-induced hypokalemia      Diclofenac Sodium (VOLTAREN) 1 % Apply 2 g topically 4 (four) times a day  Qty: 100 g, Refills: 1    Associated Diagnoses: Other chronic pain      fluticasone (FLONASE) 50 mcg/act nasal spray 2 sprays into each nostril daily  Qty: 1 g, Refills: 3    Associated Diagnoses: Acute pansinusitis, recurrence not specified      naloxone (NARCAN) 4 mg/0 1 mL nasal spray Administer 1 spray into a nostril  If breathing does not return to normal or if breathing difficulty resumes after 2-3 minutes, give another dose in the other nostril using a new spray  Qty: 1 each, Refills: 1    Associated Diagnoses: Chronic, continuous use of opioids      torsemide (DEMADEX) 20 mg tablet Take 1 tablet (20 mg total) by mouth daily  Qty: 30 tablet, Refills: 0    Associated Diagnoses: Edema of both lower legs             No discharge procedures on file      PDMP Review       Value Time User    PDMP Reviewed  Yes 2/14/2022 12:31 PM Rey Rogers DO          ED Provider  Electronically Signed by           Lata Flynn PA-C  03/18/22 1014

## 2022-03-17 NOTE — QUICK NOTE
Stroke alert activated by Nancy Matthews in the [facility] ED via the emergency  at 725pm      Patient is 8 year old woman with hx of HTN, HLD, GERD, presenting with aphasia and R sided weakness  She was last normal this AM around 930  The family noted that her speech had become non sensical   They did not seek medical attention at that time, but this evening, she began having falls, and EMS was activated  On exam:   BP: 183/82  Awake, alert, not following commands, and speech is difficult to comprehend  R sided facial weakness, no gaze deviation, speech is dysarthric  R arm and leg are flaccid  Later she did have improvement in the mental status, able to speak, understood she was in the hospital, and that she was having a stroke, and wanted to undergo aggressive measures to restore function  NIHSS 12  CTH images reviewed: no hemorrhage  No hyperdense MCA sign  No loss of grey white differentiation  CTA H&N images reviewed: L carotid artery becomes occluded shortly after the bifurcation  The ICA terminus reconstitutes, but distal branches are diminished in opacification  AP: 8 year old woman with HTN, HLD, GERD, presenting with a L MCA syndrome  Her NIHSS is 12, for aphasia and R sided weakness  CTH without major findings, and the CTA showing a carotid occlusion with distal reconstitution  Not a TPA candidate as the onset of symptoms was this AM at 930  Discussed with neuroendovascular  Not a candidate for mechanical thrombectomy since the intracranial arteries are open  Medical optimization was recommended  Would give IV fluids, and give dual antiplatelet with  and plavix 300, as well as lipitor 40mg  Would maintain perfusion pressure >101-643 systolic

## 2022-03-18 PROBLEM — I65.22 ICAO (INTERNAL CAROTID ARTERY OCCLUSION), LEFT: Status: ACTIVE | Noted: 2022-01-01

## 2022-03-18 PROBLEM — I63.9 ACUTE EMBOLIC STROKE (HCC): Status: ACTIVE | Noted: 2022-01-01

## 2022-03-18 NOTE — PLAN OF CARE
Problem: PAIN - ADULT  Goal: Verbalizes/displays adequate comfort level or baseline comfort level  Description: Interventions:  - Encourage patient to monitor pain and request assistance  - Assess pain using appropriate pain scale  - Administer analgesics based on type and severity of pain and evaluate response  - Implement non-pharmacological measures as appropriate and evaluate response  - Consider cultural and social influences on pain and pain management  - Notify physician/advanced practitioner if interventions unsuccessful or patient reports new pain  Outcome: Progressing     Problem: INFECTION - ADULT  Goal: Absence or prevention of progression during hospitalization  Description: INTERVENTIONS:  - Assess and monitor for signs and symptoms of infection  - Monitor lab/diagnostic results  - Monitor all insertion sites, i e  indwelling lines, tubes, and drains  - Monitor endotracheal if appropriate and nasal secretions for changes in amount and color  - [place] appropriate cooling/warming therapies per order  - Administer medications as ordered  - Instruct and encourage patient and family to use good hand hygiene technique  - Identify and instruct in appropriate isolation precautions for identified infection/condition  Outcome: Progressing

## 2022-03-18 NOTE — NURSING NOTE
Report received at bedside by ICU Rn, Brandie Joy  Patient hooked up to telemetry and Vicki  Patient oriented to room, call bell within reach, bed locked and in lowest position, alarm activated  Daughter currently at bedside visiting

## 2022-03-18 NOTE — PLAN OF CARE
Problem: PHYSICAL THERAPY ADULT  Goal: Performs mobility at highest level of function for planned discharge setting  See evaluation for individualized goals  Description: Treatment/Interventions: Functional transfer training,LE strengthening/ROM,Therapeutic exercise,Endurance training,Cognitive reorientation,Patient/family training,Equipment eval/education,Bed mobility,Gait training,Spoke to nursing,Spoke to case management,OT  Equipment Recommended:  (continue RW use)       See flowsheet documentation for full assessment, interventions and recommendations  3/18/2022 1222 by Yun Velasquez PT  Note: Prognosis: Fair  Problem List: Decreased strength,Decreased endurance,Impaired balance,Decreased mobility,Decreased safety awareness,Impaired judgement,Decreased cognition  Assessment: Pt is 80 y o  female seen for high-complexity PT evaluation on 3/18/2022 s/p admit to [facility] on 3/17/2022 w/ ICAO (internal carotid artery occlusion), left; pt presented to ED with R sided weakness and dysarthria  Diagnosed with L MCA syndrome after imaging and neuro evaluation  PT was consulted to assess pt's functional mobility and d/c needs  Order placed for PT eval and tx, w/ up w/ A order  Pt is questionable historian, PT unsure of home setup and PLOF at this time  At time of eval, pt requires min/mod A for all mobility tasks, able to sequence short amb trial with use of RW with max A for RW management  Upon evaluation, pt presenting with impaired functional mobility d/t decreased strength, decreased endurance, impaired balance, decreased mobility, decreased cognition, impaired judgement and decreased safety awareness  Pertinent PMHx and current co-morbidities affecting pt's physical performance at time of assessment include: AFib, HTN, GERD  Personal factors affecting pt at time of eval include: decreased initiation and engagement, limited insight into impairments and advanced age   The following objective measures performed on IE also reveal limitations: AM-PAC 6-Clicks: 69/38  Pt's clinical presentation is currently unstable/unpredictable seen in pt's presentation of elevated BP readings, decreased insight and increased time for processing  Overall, pt's rehab potential and prognosis to return to PLOF is fair as impacted by objective findings, warranting pt to receive further skilled PT interventions to address identified impairments, activity limitation(s), and participation restriction(s)  Pt to benefit from continued PT tx to address deficits as defined above and maximize level of functional independent mobility and consistency in order for pt to improve safety with OOB activity  From PT/mobility standpoint, recommendation at time of d/c would be post acute rehabilitation services pending progress in order to facilitate return to PLOF  Barriers to Discharge: Other (Comment) (unknown barriers)        PT Discharge Recommendation: Post acute rehabilitation services          See flowsheet documentation for full assessment

## 2022-03-18 NOTE — ASSESSMENT & PLAN NOTE
· No known history   · Chads2-vasc score of 4  · Continue just aspirin for now  · Hold full AC in the setting of multiple falls and age

## 2022-03-18 NOTE — PROGRESS NOTES
[facility]  Progress Note - Marisel Huertas 3/29/1921, 80 y o  female MRN: [id number]  Unit/Bed#: ICU 06-01 Encounter: [id number]  Primary Care Provider: Nick Moreno MD   Date and time admitted to hospital: 3/17/2022  7:19 PM    * ICAO (internal carotid artery occlusion), left  Assessment & Plan  · Patient developed speech difficulties morning of 3/17 with multiple subsequent falls prompting medical evaluation  · Stroke alert in ED  · 3/17 CTH: No acute intracranial hemorrhage  Right frontal centrum semiovale age-indeterminate ischemia/infarct  Microangiopathic changes  · 3/17 CTA H/N: Occluded left cervical ICA and intracranial ICA  Reconstitution in the left supraclinoid /ICA terminus  Diminutive/decreased flow left MCA and decreased flow distal branches  · Neurology evaluated - not a candidate for tPA given chronicity of symptoms; not an candidate for endovascular intervention given re-constitution of blood vessels intra-cranially   · Continue stroke pathway  · q1h neuro checks   · PT/OT/Speech/PMR  · Repeat CTH at 24h post-admit  · MRI brain completed, read pending  · Echocardiogram  · Lipid panel, HbA1c  · Goal -220    Hypertension  Assessment & Plan  · Outpatient regimen of verapamil 180 CR tablet   · Hold for permissive hypertension    A-fib (HCC)  Assessment & Plan  · No known history   · Chads2-vasc score of 4  · Continue just aspirin for now  · Hold full AC in the setting of multiple falls and age    GERD (gastroesophageal reflux disease)  Assessment & Plan  · Takes omeprazole 20 mg daily as an outpatient  · Protonix 40 mg daily    Pure hypercholesterolemia  Assessment & Plan  · Takes pravastatin as an outpatient  · Continue atorvastatin 40 mg PO daily    ----------------------------------------------------------------------------------------  HPI/24hr events: Admitted for stroke management  Has maintained goal SBP without any intervention   Mental status improved with clearer speech  Disposition: Consider downgrade later today  Code Status: Level 3 - DNAR and DNI  ---------------------------------------------------------------------------------------  SUBJECTIVE  "I'm fine"    Review of Systems   Respiratory: Negative for shortness of breath  Cardiovascular: Negative for chest pain  Gastrointestinal: Negative for abdominal pain and nausea  Genitourinary: Negative for difficulty urinating  Neurological: Negative for headaches  Review of systems was reviewed and negative unless stated above in HPI/24-hour events   ---------------------------------------------------------------------------------------  OBJECTIVE    Vitals   Vitals:    22 0000 22 0100 22 0200 22 0300   BP: (!) 220/86 (!) 220/90 (!) 195/78 (!) 196/85   BP Location: Right arm Right arm Right arm Right arm   Pulse: 93 92 88 96   Resp: (!) 38 (!) 33 (!) 32 (!) 27   Temp: (!) 97 1 °F (36 2 °C)      TempSrc: Temporal      SpO2: (!) 88% 91% (!) 88% (!) 88%   Weight:       Height:         Temp (24hrs), Av 5 °F (36 4 °C), Min:97 1 °F (36 2 °C), Max:97 7 °F (36 5 °C)  Current: Temperature: (!) 97 1 °F (36 2 °C)    Respiratory:  SpO2: SpO2: (!) 88 %, SpO2 Device: O2 Device: None (Room air)    Physical Exam  HENT:      Mouth/Throat:      Mouth: Mucous membranes are moist    Cardiovascular:      Rate and Rhythm: Tachycardia present  Rhythm irregularly irregular  Pulses:           Radial pulses are 2+ on the right side and 2+ on the left side  Dorsalis pedis pulses are 2+ on the right side and 2+ on the left side  Pulmonary:      Effort: No respiratory distress  Breath sounds: No wheezing, rhonchi or rales  Musculoskeletal:      Cervical back: Neck supple  Right lower le+ Pitting Edema present  Left lower le+ Pitting Edema present  Skin:     General: Skin is warm and dry  Capillary Refill: Capillary refill takes less than 2 seconds  Neurological:      Mental Status: She is alert and oriented to person, place, and time  Cranial Nerves: Dysarthria (improved) present  Sensory: Sensation is intact  Comments: 4+/5 RUE/RLE  5/5 LUE/LLE  Left pupil 4mm and sluggish  Right puil 3mm and reactive       Laboratory and Diagnostics:  Results from last 7 days   Lab Units 03/17/22 1935   WBC Thousand/uL 12 40*   HEMOGLOBIN g/dL 13 7   HEMATOCRIT % 39 7   PLATELETS Thousands/uL 262     Results from last 7 days   Lab Units 03/17/22 1935   SODIUM mmol/L 134*   POTASSIUM mmol/L 3 6   CHLORIDE mmol/L 97   CO2 mmol/L 27   ANION GAP mmol/L 10   BUN mg/dL 20   CREATININE mg/dL 0 68   CALCIUM mg/dL 9 1   GLUCOSE RANDOM mg/dL 141*          Results from last 7 days   Lab Units 03/17/22 1935   INR  1 09   PTT seconds 29              ABG:    VBG:          Micro        EKG: Afib  Imaging: I have personally reviewed pertinent films in PACS    Intake and Output  I/O       03/16 0701  03/17 0700 03/17 0701 03/18 0700    I V  (mL/kg)  155 8 (2 6)    Total Intake(mL/kg)  155 8 (2 6)    Urine (mL/kg/hr)  750    Total Output  750    Net  -594 2                Height and Weights   Height: 4' 6" (137 2 cm)     Body mass index is 31 36 kg/m²  Weight (last 2 days)     Date/Time Weight    03/17/22 2138 59 (130 07)    03/17/22 2054 56 7 (125)            Nutrition       Diet Orders   (From admission, onward)             Start     Ordered    03/17/22 2149  Diet NPO  Diet effective now        References:    Nutrtion Support Algorithm Enteral vs  Parenteral   Question Answer Comment   Diet Type NPO    RD to adjust diet per protocol?  Yes        03/17/22 2149                  Active Medications  Scheduled Meds:  Current Facility-Administered Medications   Medication Dose Route Frequency Provider Last Rate    acetaminophen  650 mg Oral Q6H PRN Regina Clemens PA-C      aspirin  81 mg Oral Daily Regina Clemens PA-C      atorvastatin  40 mg Oral QPM Clair Antwon German PA-C      heparin (porcine)  5,000 Units Subcutaneous [facility] [place]      multi-electrolyte  50 mL/hr Intravenous Continuous [facility] [place] 50 mL/hr (03/17/22 2330)    ondansetron  4 mg Intravenous Q6H PRN Bhavana Mejia PA-C      pantoprazole  40 mg Oral Early Morning Bhavana Mejia PA-C      polyethylene glycol  17 g Oral Daily Bhavana Mejia PA-C       Continuous Infusions:  multi-electrolyte, 50 mL/hr, Last Rate: 50 mL/hr (03/17/22 2330)      PRN Meds:   acetaminophen, 650 mg, Q6H PRN  ondansetron, 4 mg, Q6H PRN        Invasive Devices Review  Invasive Devices  Report    Peripheral Intravenous Line            Peripheral IV 03/17/22 Left Antecubital 1 day    Peripheral IV 03/17/22 Right Antecubital <1 day                Rationale for remaining devices: IV access  ---------------------------------------------------------------------------------------  Advance Directive and Living Will:      Power of :    POLST:    ---------------------------------------------------------------------------------------  Care Time Delivered:   No Critical Care time spent       Bhavana Mejia PA-C      Portions of the record may have been created with voice recognition software  Occasional wrong word or "sound a like" substitutions may have occurred due to the inherent limitations of voice recognition software    Read the chart carefully and recognize, using context, where substitutions have occurred

## 2022-03-18 NOTE — ASSESSMENT & PLAN NOTE
· Patient developed speech difficulties morning of 3/17 with multiple subsequent falls prompting medical evaluation  · Stroke alert in ED  · 3/17 CTH: No acute intracranial hemorrhage  Right frontal centrum semiovale age-indeterminate ischemia/infarct  Microangiopathic changes  · 3/17 CTA H/N: Occluded left cervical ICA and intracranial ICA  Reconstitution in the left supraclinoid /ICA terminus   Diminutive/decreased flow left MCA and decreased flow distal branches  · Neurology evaluated - not a candidate for tPA given chronicity of symptoms; not an candidate for endovascular intervention given re-constitution of blood vessels intra-cranially   · Continue stroke pathway  · q1h neuro checks   · PT/OT/Speech/PMR  · Repeat CTH at 24h post-admit  · MRI brain completed, read pending  · Echocardiogram  · Lipid panel, HbA1c  · Goal -220

## 2022-03-18 NOTE — QUICK NOTE
Verbal report received from the ICU team  Patient is now on SLIM service  Medical management per progress note from prior today

## 2022-03-18 NOTE — SPEECH THERAPY NOTE
Speech Language/Pathology  Speech/Language Pathology  Assessment    Patient Name: Rylan Shah  [id number] Date: 3/18/2022     Problem List  Principal Problem:    ICAO (internal carotid artery occlusion), left  Active Problems:    Pure hypercholesterolemia    Hypertension    GERD (gastroesophageal reflux disease)    [facility] ([facility] )    Past Medical History  Past Medical History:   Diagnosis Date    Benign essential hypertension     Cancer ([facility] )     rectal and sigmoid colon     Carcinoma in situ     Carotid artery occlusion     Disorder of rectum and anus     Disorder of skin     Diverticular disease of colon     Edema     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)     Essential hypertension     Gastroesophageal reflux disease     Herpes zoster without complication     Hyperlipidemia     Hypokalemia     Idiopathic peripheral neuropathy     Iron deficiency anemia     Localized, primary osteoarthritis     Lower leg    Malaise and fatigue     Mixed hyperlipidemia     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     Post-herpetic polyneuropathy     Unspecified osteoarthritis, unspecified site     Vitamin D deficiency      Past Surgical History  Past Surgical History:   Procedure Laterality Date    COLON SURGERY      Colon resection with open surgery     COLONOSCOPY  2013    Positive for tumor     HYSTERECTOMY      OTHER SURGICAL HISTORY      fibrous tumor    RECTAL SURGERY  09/2013    tumor        03/18/22 0900   Patient Information   Current Medical CVA/TIA, alt mental w R sided paralysis   Special Studies MRI, CT   Past Medical History HTN, HLD, GERD   Cognition   Overall Cognitive Status Impaired   Arousal/Participation Alert; Responsive; Cooperative   Attention Within functional limits   Orientation Level Oriented to person;Disoriented to place; Disoriented to time;Disoriented to situation   Speech/Swallow Mechanism Exam   Labial Symmetry WFL   Labial Strength WFL   Labial ROM WFL   Labial Sensation WFL   Facial Symmetry WFL   Facial Strength WFL   Facial ROM WFL   Facial Sensation WFL   Lingual Symmetry WFL   Lingual Strength WFL   Lingual ROM WFL   Lingual Sensation WFL   Dentition Adequate   Volitional Cough Strong   Respirations (!) 27   SpO2 93 %   Motor Speech Evaluation   Respiration/Phonation X   Vocal Quality X   Dysarthria No   Intelligibility Intelligible   Auditory Comprehension   Word Level Comprehension WFL   Yes/No Questions WFL   Commands WFL   Comphrehends Conversation Simple   Reading Comprehension   Reading Status LAURA   Expression   Primary Mode of Expression Verbal   Verbal Expression   Repetition No impairment   Automatic Speech WFL   Naming [facility]   Written Expression   Dominant Hand Right   Written Expression X   Summary   Speech/Language Summary Patient administered the MAST aphasia screening test, completed with high acc  Pt completed items in expressive and receptive language with minimal difficulty though pt did benefit from processing time  Patient had the most difficulty with fluency naming including listing items in a category  Pt demonstrated some word finding difficulty in conversation but otherwise did very well  Vocal quality is baseline per family however pt demonstrating poor respiration and phonation abilities  Pt with strained vocal quality  ST will be following up for language changes  Delirium Assessment-ICU CAM   Feature 1: Acute Onset or Fluctuating Course Positive   Feature 2: Inattention Positive   Feature 3: Altered Level of Consciousness Negative 0   Feature 4: Disorganized Thinking Positive   Overall CAM-ICU Positive   Delirium Interventions Sleep deprivation prevention;Early mobilization; Ensure adequate nutrition/hydration;Monitor urine and bowel function; Ensure adequate oxygenation; Review lab results;Frequent reorientation;Frequent direction/reassurance; Review/adjust fall precaution interventions

## 2022-03-18 NOTE — PLAN OF CARE
Problem: PAIN - ADULT  Goal: Verbalizes/displays adequate comfort level or baseline comfort level  Description: Interventions:  - Encourage patient to monitor pain and request assistance  - Assess pain using appropriate pain scale  - Administer analgesics based on type and severity of pain and evaluate response  - Implement non-pharmacological measures as appropriate and evaluate response  - Consider cultural and social influences on pain and pain management  - Notify physician/advanced practitioner if interventions unsuccessful or patient reports new pain  Outcome: Progressing     Problem: INFECTION - ADULT  Goal: Absence or prevention of progression during hospitalization  Description: INTERVENTIONS:  - Assess and monitor for signs and symptoms of infection  - Monitor lab/diagnostic results  - Monitor all insertion sites, i e  indwelling lines, tubes, and drains  - Monitor endotracheal if appropriate and nasal secretions for changes in amount and color  - [place] appropriate cooling/warming therapies per order  - Administer medications as ordered  - Instruct and encourage patient and family to use good hand hygiene technique  - Identify and instruct in appropriate isolation precautions for identified infection/condition  Outcome: Progressing     Problem: SAFETY ADULT  Goal: Patient will remain free of falls  Description: INTERVENTIONS:  - Educate patient/family on patient safety including physical limitations  - Instruct patient to call for assistance with activity   - Consult OT/PT to assist with strengthening/mobility   - Keep Call bell within reach  - Keep bed low and locked with side rails adjusted as appropriate  - Keep care items and personal belongings within reach  - Initiate and maintain comfort rounds  - Make Fall Risk Sign visible to staff  - Offer Toileting every 2 Hours, in advance of need  - Initiate/Maintain fall alarm  - Obtain necessary fall risk management equipment: fall alarm  - Apply yellow socks and bracelet for high fall risk patients  - Consider moving patient to room near nurses station  Outcome: Progressing  Goal: Maintain or return to baseline ADL function  Description: INTERVENTIONS:  -  Assess patient's ability to carry out ADLs; assess patient's baseline for ADL function and identify physical deficits which impact ability to perform ADLs (bathing, care of mouth/teeth, toileting, grooming, dressing, etc )  - Assess/evaluate cause of self-care deficits   - Assess range of motion  - Assess patient's mobility; develop plan if impaired  - Assess patient's need for assistive devices and provide as appropriate  - Encourage maximum independence but intervene and supervise when necessary  - Involve family in performance of ADLs  - Assess for home care needs following discharge   - Consider OT consult to assist with ADL evaluation and planning for discharge  - Provide patient education as appropriate  Outcome: Progressing  Goal: Maintains/Returns to pre admission functional level  Description: INTERVENTIONS:  - Perform BMAT or MOVE assessment daily    - Set and communicate daily mobility goal to care team and patient/family/caregiver  - Collaborate with rehabilitation services on mobility goals if consulted  - Perform Range of Motion 3 times a day  - Reposition patient every 2 hours    - Dangle patient 3 times a day  - Stand patient 3 times a day  - Ambulate patient 3 times a day  - Out of bed to chair 3 times a day   - Out of bed for meals 3 times a day  - Out of bed for toileting  - Record patient progress and toleration of activity level   Outcome: Progressing     Problem: DISCHARGE PLANNING  Goal: Discharge to home or other facility with appropriate resources  Description: INTERVENTIONS:  - Identify barriers to discharge w/patient and caregiver  - Arrange for needed discharge resources and transportation as appropriate  - Identify discharge learning needs (meds, wound care, etc )  - Arrange for interpretive services to assist at discharge as needed  - Refer to Case Management Department for coordinating discharge planning if the patient needs post-hospital services based on physician/advanced practitioner order or complex needs related to functional status, cognitive ability, or social support system  Outcome: Progressing     Problem: Knowledge Deficit  Goal: Patient/family/caregiver demonstrates understanding of disease process, treatment plan, medications, and discharge instructions  Description: Complete learning assessment and assess knowledge base  Interventions:  - Provide teaching at level of understanding  - Provide teaching via preferred learning methods  Outcome: Progressing     Problem: MOBILITY - ADULT  Goal: Maintain or return to baseline ADL function  Description: INTERVENTIONS:  -  Assess patient's ability to carry out ADLs; assess patient's baseline for ADL function and identify physical deficits which impact ability to perform ADLs (bathing, care of mouth/teeth, toileting, grooming, dressing, etc )  - Assess/evaluate cause of self-care deficits   - Assess range of motion  - Assess patient's mobility; develop plan if impaired  - Assess patient's need for assistive devices and provide as appropriate  - Encourage maximum independence but intervene and supervise when necessary  - Involve family in performance of ADLs  - Assess for home care needs following discharge   - Consider OT consult to assist with ADL evaluation and planning for discharge  - Provide patient education as appropriate  Outcome: Progressing  Goal: Maintains/Returns to pre admission functional level  Description: INTERVENTIONS:  - Perform BMAT or MOVE assessment daily    - Set and communicate daily mobility goal to care team and patient/family/caregiver  - Collaborate with rehabilitation services on mobility goals if consulted  - Perform Range of Motion 3 times a day  - Reposition patient every 2 hours    - Dangle patient 3 times a day  - Stand patient 3 times a day  - Ambulate patient 3 times a day  - Out of bed to chair 3 times a day   - Out of bed for meals 3 times a day  - Out of bed for toileting  - Record patient progress and toleration of activity level   Outcome: Progressing     Problem: Prexisting or High Potential for Compromised Skin Integrity  Goal: Skin integrity is maintained or improved  Description: INTERVENTIONS:  - Identify patients at risk for skin breakdown  - Assess and monitor skin integrity  - Assess and monitor nutrition and hydration status  - Monitor labs   - Assess for incontinence   - Turn and reposition patient  - Assist with mobility/ambulation  - Relieve pressure over bony prominences  - Avoid friction and shearing  - Provide appropriate hygiene as needed including keeping skin clean and dry  - Evaluate need for skin moisturizer/barrier cream  - Collaborate with interdisciplinary team   - Patient/family teaching  - Consider wound care consult   Outcome: Progressing

## 2022-03-18 NOTE — NURSING NOTE
Pt daughter took home pt belongings  The watch as well as 2 rings and eyeglasses were sent home with daughter

## 2022-03-18 NOTE — PLAN OF CARE
Problem: OCCUPATIONAL THERAPY ADULT  Goal: Performs self-care activities at highest level of function for planned discharge setting  See evaluation for individualized goals  Description: Treatment Interventions: ADL retraining,Functional transfer training,Endurance training,Patient/family training,Neuromuscular reeducation,Compensatory technique education,Energy conservation,Activityengagement     See flowsheet documentation for full assessment, interventions and recommendations  Note: Limitation: Decreased ADL status,Decreased UE strength,Decreased Safe judgement during ADL,Decreased cognition,Decreased endurance,Decreased self-care trans,Decreased high-level ADLs  Prognosis: Good  Assessment: Pt is a 80 y o  female seen for OT evaluation s/p admit to [place] [id number] on 3/17/2022 w/ ICAO (internal carotid artery occlusion), left  Comorbidities affecting pt's functional performance at time of assessment include: HTN, GERD, A-fib, CKD  Personal factors affecting pt at time of IE include:difficulty performing ADLS, difficulty performing IADLS  and limited insight into deficits  Upon evaluation: the following deficits impact occupational performance: decreased strength, decreased balance, decreased tolerance, impaired initiation, impaired memory, impaired sequencing, impaired problem solving and decreased safety awareness  Pt to benefit from continued skilled OT tx while in the hospital to address deficits as defined above and maximize level of functional independence w ADL's and functional mobility  Occupational Performance areas to address include: bathing/shower, toilet hygiene, dressing, functional mobility and clothing management  From OT standpoint, recommendation at time of d/c would be STR       OT Discharge Recommendation: Post acute rehabilitation services  OT - OK to Discharge: Yes (when medically stable)    Muna Marie MS, OTR/L

## 2022-03-18 NOTE — PLAN OF CARE
Problem: PAIN - ADULT  Goal: Verbalizes/displays adequate comfort level or baseline comfort level  Description: Interventions:  - Encourage patient to monitor pain and request assistance  - Assess pain using appropriate pain scale  - Administer analgesics based on type and severity of pain and evaluate response  - Implement non-pharmacological measures as appropriate and evaluate response  - Consider cultural and social influences on pain and pain management  - Notify physician/advanced practitioner if interventions unsuccessful or patient reports new pain  Outcome: Progressing     Problem: INFECTION - ADULT  Goal: Absence or prevention of progression during hospitalization  Description: INTERVENTIONS:  - Assess and monitor for signs and symptoms of infection  - Monitor lab/diagnostic results  - Monitor all insertion sites, i e  indwelling lines, tubes, and drains  - Monitor endotracheal if appropriate and nasal secretions for changes in amount and color  - [place] appropriate cooling/warming therapies per order  - Administer medications as ordered  - Instruct and encourage patient and family to use good hand hygiene technique  - Identify and instruct in appropriate isolation precautions for identified infection/condition  Outcome: Progressing  Goal: Absence of fever/infection during neutropenic period  Description: INTERVENTIONS:  - Monitor WBC    Outcome: Progressing     Problem: SAFETY ADULT  Goal: Patient will remain free of falls  Description: INTERVENTIONS:  - Educate patient/family on patient safety including physical limitations  - Instruct patient to call for assistance with activity   - Consult OT/PT to assist with strengthening/mobility   - Keep Call bell within reach  - Keep bed low and locked with side rails adjusted as appropriate  - Keep care items and personal belongings within reach  - Initiate and maintain comfort rounds  - Make Fall Risk Sign visible to staff  - Offer Toileting every 2 Hours, in advance of need  - Initiate/Maintain fall alarm  - Obtain necessary fall risk management equipment: fall alarm  - Apply yellow socks and bracelet for high fall risk patients  - Consider moving patient to room near nurses station  Outcome: Progressing  Goal: Maintain or return to baseline ADL function  Description: INTERVENTIONS:  -  Assess patient's ability to carry out ADLs; assess patient's baseline for ADL function and identify physical deficits which impact ability to perform ADLs (bathing, care of mouth/teeth, toileting, grooming, dressing, etc )  - Assess/evaluate cause of self-care deficits   - Assess range of motion  - Assess patient's mobility; develop plan if impaired  - Assess patient's need for assistive devices and provide as appropriate  - Encourage maximum independence but intervene and supervise when necessary  - Involve family in performance of ADLs  - Assess for home care needs following discharge   - Consider OT consult to assist with ADL evaluation and planning for discharge  - Provide patient education as appropriate  Outcome: Progressing  Goal: Maintains/Returns to pre admission functional level  Description: INTERVENTIONS:  - Perform BMAT or MOVE assessment daily    - Set and communicate daily mobility goal to care team and patient/family/caregiver  - Collaborate with rehabilitation services on mobility goals if consulted  - Perform Range of Motion 3 times a day  - Reposition patient every 2 hours    - Dangle patient 3 times a day  - Stand patient 3 times a day  - Ambulate patient 3 times a day  - Out of bed to chair 3 times a day   - Out of bed for meals 3 times a day  - Out of bed for toileting  - Record patient progress and toleration of activity level   Outcome: Progressing     Problem: DISCHARGE PLANNING  Goal: Discharge to home or other facility with appropriate resources  Description: INTERVENTIONS:  - Identify barriers to discharge w/patient and caregiver  - Arrange for needed discharge resources and transportation as appropriate  - Identify discharge learning needs (meds, wound care, etc )  - Arrange for interpretive services to assist at discharge as needed  - Refer to Case Management Department for coordinating discharge planning if the patient needs post-hospital services based on physician/advanced practitioner order or complex needs related to functional status, cognitive ability, or social support system  Outcome: Progressing     Problem: Knowledge Deficit  Goal: Patient/family/caregiver demonstrates understanding of disease process, treatment plan, medications, and discharge instructions  Description: Complete learning assessment and assess knowledge base    Interventions:  - Provide teaching at level of understanding  - Provide teaching via preferred learning methods  Outcome: Progressing

## 2022-03-18 NOTE — CASE MANAGEMENT
Case Management Discharge Planning Note    Patient name Cathren Ganser  Location ICU 06/ICU 32-79 MRN [id number]  : 3/29/1921 Date 3/18/2022       Current Admission Date: 3/17/2022  Current Admission Diagnosis:ICAO (internal carotid artery occlusion), left   Patient Active Problem List    Diagnosis Date Noted    ICAO (internal carotid artery occlusion), left 2022    GERD (gastroesophageal reflux disease) 2022    A-fib ([address] ) 2022    Hypokalemia 2021    Neuropathy of ankle, left 2021    Persistent proteinuria 2021    Primary osteoarthritis of both knees 2020    Primary osteoarthritis of left knee 2020    Eustachian tube dysfunction, right 2020    Hypertension 2020    Arthritis 2020    Pure hypercholesterolemia 2019    Sensorineural hearing loss (SNHL) of both ears 2019      LOS (days): 1  Geometric Mean LOS (GMLOS) (days): 2 90  Days to GMLOS:2 1     OBJECTIVE:  Risk of Unplanned Readmission Score: 11     Current admission status: Inpatient   Preferred Pharmacy:   [facility], [address] [place] [address],[address] [place] [address] Road  Phone: [phone] Fax: [phone]    Primary Care Provider: Ana Prakash MD    Primary Insurance: MEDICARE  Secondary Insurance: [address] DETAILS:  Received TC from pt daughter about STR  Pt daughter states she would rather have home PT  A post acute care recommendation was made by your care team for [facility]  Discussed Freedom of Choice with POA  List of agencies given to POA via in person  POA aware the list is custom filtered for them by preference  and that [facility]'s post acute providers are designated  Left the list with the pt for the daughter to choose from

## 2022-03-18 NOTE — SPEECH THERAPY NOTE
Speech Language/Pathology  Speech/Language Pathology  Assessment    Patient Name: Cathren Ganser LESOH'A Date: 3/18/2022     Problem List  Principal Problem:    ICAO (internal carotid artery occlusion), left  Active Problems:    Pure hypercholesterolemia    Hypertension    GERD (gastroesophageal reflux disease)    A-fib ([facility] )    Past Medical History  Past Medical History:   Diagnosis Date    Benign essential hypertension     Cancer ([facility] )     rectal and sigmoid colon     Carcinoma in situ     Carotid artery occlusion     Disorder of rectum and anus     Disorder of skin     Diverticular disease of colon     Edema     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)     Essential hypertension     Gastroesophageal reflux disease     Herpes zoster without complication     Hyperlipidemia     Hypokalemia     Idiopathic peripheral neuropathy     Iron deficiency anemia     Localized, primary osteoarthritis     Lower leg    Malaise and fatigue     Mixed hyperlipidemia     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     Post-herpetic polyneuropathy     Unspecified osteoarthritis, unspecified site     Vitamin D deficiency      Past Surgical History  Past Surgical History:   Procedure Laterality Date    COLON SURGERY      Colon resection with open surgery     COLONOSCOPY  2013    Positive for tumor     HYSTERECTOMY      OTHER SURGICAL HISTORY      fibrous tumor    RECTAL SURGERY  09/2013    tumor        03/18/22 0900   Patient Information   Current Medical CVA/TIA, alt mental w R sided paralysis   Special Studies MRI, CT   Past Medical History HTN, HLD, GERD   Swallow Information   Current Risks for Dysphagia & Aspiration New Neuro event;Mental status change   Current Symptoms/Concerns Other (Comment)  (Pt passed NSG dysphagia screening)   Current Diet NPO   Baseline Diet Regular; Thin liquids   Baseline Assessment   Behavior/Cognition Alert; Cooperative; Interactive   Speech/Language Status Expressive aphasia   Patient Positioning Upright in bed   Swallow Mechanism Exam   Labial Symmetry WFL   Labial Strength WFL   Labial ROM WFL   Labial Sensation WFL   Facial Symmetry WFL   Facial Strength WFL   Facial ROM WFL   Facial Sensation WFL   Lingual Symmetry WFL   Lingual Strength WFL   Lingual ROM WFL   Lingual Sensation WFL   Dentition Adequate   Volitional Cough Strong   Consistencies Assessed and Performance   Materials Admnistered Mechanical Soft/Level 2; Thin liquid; Nectar thick liquid   Oral Stage WFL   Phargngeal Stage WFL; Mild impaired   Swallow Mechanics Mild delayed;Swallow initation;Good [place]neal rise;Vocal Cord dysfunction suspected   Strategies and Efficacy Small bites, small sips, slow rate   Summary   Swallow Summary Patient received upright in bed, boosted with NSG  Patient reports that she's doing "pretty good" and is feeling more herself  Patient's nurse reports pt passed NSG screen  Patient administered NTL, thin and mech soft  Patient tolerated all items appropriately with good overall reception, AP transport WFL and swallow timing only mildly delayed  Patient with hoarse vocal quality that she reports is baseline  Suspect mild R neglect  Pt will have language evaluation  Recommendations   Risk for Aspiration Mild   Recommendations Consider oral diet   Diet Solid Recommendation Level 2 Dysphagia/ mechanical soft/altered   Diet Liquid Recommendation Nectar thick liquid   Recommended Form of Meds Whole; With thick liquid; With puree   General Precautions Aspiration precautions;Minimize distractions;Upright as possible for all oral intake   Compensatory Swallowing Strategies Alternate solids and liquids   Results Reviewed with RN;PT/Family/Caregiver   Speech Therapy Prognosis   Prognosis Good   Prognosis Considerations Age; Co-Morbidities

## 2022-03-18 NOTE — OCCUPATIONAL THERAPY NOTE
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)     Essential hypertension     Gastroesophageal reflux disease     Herpes zoster without complication     Hyperlipidemia     Hypokalemia     Idiopathic peripheral neuropathy     Iron deficiency anemia     Localized, primary osteoarthritis     Lower leg    Malaise and fatigue     Mixed hyperlipidemia     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     Post-herpetic polyneuropathy     Unspecified osteoarthritis, unspecified site     Vitamin D deficiency        Past Surgical History:   Procedure Laterality Date    COLON SURGERY      Colon resection with open surgery     COLONOSCOPY  2013    Positive for tumor     HYSTERECTOMY      OTHER SURGICAL HISTORY      fibrous tumor    RECTAL SURGERY  09/2013    tumor        03/18/22 1053   OT Last Visit   OT Visit Date 03/18/22   Note Type   Note type Evaluation   Restrictions/Precautions   Weight Bearing Precautions Per Order No   Other Precautions Fall Risk;Multiple lines;Telemetry;Hard of hearing   Pain Assessment   Pain Assessment Tool 0-10   Pain Score No Pain   Pain Rating: FLACC (Rest) - Face 0   Pain Rating: FLACC (Rest) - Legs 0   Pain Rating: FLACC (Rest) - Activity 0   Pain Rating: FLACC (Rest) - Cry 0   Pain Rating: FLACC (Rest) - Consolability 0   Score: FLACC (Rest) 0   Pain Rating: FLACC (Activity) - Face 0   Pain Rating: FLACC (Activity) - Legs 0   Pain Rating: FLACC (Activity) - Activity 0   Pain Rating: FLACC (Activity) - Cry 0   Pain Rating: FLACC (Activity) - Consolability 0   Score: FLACC (Activity) 0   Home Living   Type of [address] One level;Performs ADLs on one level; Able to live on main level with bedroom/bathroom;Stairs to enter with rails  (4 YAYO)   Bathroom Shower/Tub Tub/shower unit  (pt sponge bathes at baseline)   Bathroom Toilet Raised   Bathroom Equipment Grab bars around toilet;Grab bars in shower   [address] lift  (pt reports use of w/c at baseline)   Additional Comments pt reports use of lift with dtr assistance   Prior Function   Level of [place] Needs assistance with ADLs and functional mobility; Needs assistance with IADLs   Lives With Daughter   Receives Help From Family   ADL Assistance Needs assistance  (RN reports Pt being Mod I with ADLs PTA)   IADLs Needs assistance   Falls in the last 6 months   (unknown -pt unable to report)   Comments pt is questionable historian d/t confusion   ADL   Eating Assistance 5  Supervision/Setup   Grooming Assistance 5  Supervision/Setup   UB Bathing Assistance 4  Minimal Assistance   LB Bathing Assistance 3  Moderate Assistance   UB Dressing Assistance 3  Moderate Assistance    [facility] 2  Maximal [address]  2  Maximal Assistance   Bed Mobility   Supine to Sit 3  Moderate assistance   Additional items Assist x 1;HOB elevated; Increased time required;LE management   Additional Comments BP prior to OOB mobility = 199/94, HR = 95 bpm at rest, SpO2 on RA = 94%   Transfers   Sit to Stand 4  Minimal assistance   Additional items Assist x 2; Increased time required;Armrests   Stand to Sit 4  Minimal assistance   Additional items Assist x 2;Armrests   Balance   Static Sitting Fair +   Dynamic Sitting Fair -   Static Standing Poor +   Dynamic Standing Poor Ambulatory Poor   Activity Tolerance   Activity Tolerance Patient limited by fatigue   Nurse Made Aware ANNETTE Griffin   RUE Assessment   RUE Assessment X   RUE Strength   RUE Overall Strength Deficits  (3/5)   LUE Assessment   LUE Assessment X   LUE Strength   LUE Overall Strength Deficits  (3/8)   Cognition   Overall Cognitive Status Impaired   Arousal/Participation Alert; Cooperative   Attention Attends with cues to redirect   Orientation Level Oriented to person;Oriented to place; Disoriented to time;Disoriented to situation  (able to recall being in "hospital" and [facility] [place])   Memory Decreased recall of biographical information;Decreased short term memory;Decreased recall of recent events;Decreased recall of precautions   Following Commands Follows one step commands with increased time or repetition   Assessment   Limitation Decreased ADL status; Decreased UE strength;Decreased Safe judgement during ADL;Decreased cognition;Decreased endurance;Decreased self-care trans;Decreased high-level ADLs   Prognosis Good   Assessment Pt is a 80 y o  female seen for OT evaluation s/p admit to [facility] on 3/17/2022 w/ ICAO (internal carotid artery occlusion), left  Comorbidities affecting pt's functional performance at time of assessment include: HTN, GERD, A-fib, CKD  Personal factors affecting pt at time of IE include:difficulty performing ADLS, difficulty performing IADLS  and limited insight into deficits  Upon evaluation: the following deficits impact occupational performance: decreased strength, decreased balance, decreased tolerance, impaired initiation, impaired memory, impaired sequencing, impaired problem solving and decreased safety awareness  Pt to benefit from continued skilled OT tx while in the hospital to address deficits as defined above and maximize level of functional independence w ADL's and functional mobility   Occupational Performance areas to address include: bathing/shower, toilet hygiene, dressing, functional mobility and clothing management  From OT standpoint, recommendation at time of d/c would be STR  Goals   Patient Goals able to recall being in "hospital"   Plan   Treatment Interventions ADL retraining;Functional transfer training; Endurance training;Patient/family training;Neuromuscular reeducation; Compensatory technique education; Energy conservation; Activityengagement   Goal Expiration Date 03/28/22   OT Treatment Day 0   OT Frequency 3-5x/wk   Recommendation   OT Discharge Recommendation Post acute rehabilitation services   OT - OK to Discharge Yes  (when medically stable)   Additional Comments  Pt seen as a co-eval with PT due to the patient's co-morbidities, clinically unstable presentation, and present impairments which are a regression from the patient's baseline  Additional Comments 2 The patient's raw score on the AM-PAC Daily Activity inpatient short form is 12, standardized score is 30 6, less than 39 4  Patients at this level are likely to benefit from discharge to post-acute rehabilitation services  Please refer to the recommendation of the Occupational Therapist for safe discharge planning     AM-PAC Daily Activity Inpatient   Lower Body Dressing 1   Bathing 2   Toileting 1   Upper Body Dressing 2   Grooming 3   Eating 3   Daily Activity Raw Score 12   Daily Activity Standardized Score (Calc for Raw Score >=11) 30 6   AM-[facility] Applied Cognition Inpatient   Following a Speech/Presentation 3   Understanding Ordinary Conversation 3   Taking Medications 1   Remembering Where Things Are Placed or Put Away 1   Remembering List of 4-5 Errands 1   Taking Care of Complicated Tasks 1   Applied Cognition Raw Score 10   Applied Cognition Standardized Score 24 98     GOALS:    Pt will achieve the following within specified time frame: STG  Pt will achieve the following goals within 5 days    *ADL transfers with Min (A) for inc'd independence with ADLs/purposeful tasks    *UB ADL with Min (A) for inc'd independence with self cares    *LB ADL with Mod (A) using AE prn for inc'd independence with self cares    *Toileting with Mod (A) for clothing management and hygiene for return to PLOF with personal care    *Increase static stand balance to F- and dyn stand balance to P+ for inc'd safety with standing purposeful tasks    *Increase stand tolerance x1 m for inc'd tolerance with standing purposeful tasks    *Participate in 10m UE therex to increase overall stamina/activity tolerance for purposeful tasks    *Bed mobility- Min (A) for inc'd independence to manage own comfort and initiate EOB & OOB purposeful tasks    Pt will achieve the following within specified time frame: LTG  Pt will achieve the following goals within 10 days    *ADL transfers with CGA for inc'd independence with ADLs/purposeful tasks    *UB ADL with CGA for inc'd independence with self cares    *LB ADL with Min (A) using AE prn for inc'd independence with self cares    *Toileting with Min (A) for clothing management and hygiene for return to PLOF with personal care    *Increase static stand balance to F and dyn stand balance to F- for inc'd safety with standing purposeful tasks    *Increase stand tolerance x3 m for inc'd tolerance with standing purposeful tasks    *Bed mobility- CGA for inc'd independence to manage own comfort and initiate EOB & OOB purposeful tasks      Muna Lo, MS, OTR/L

## 2022-03-18 NOTE — NURSING NOTE
Cleared pt for MRI off the monitor with Regina Clemens, [facility] at 2245  Returned to ICU bed 6 at 2330  Pt hooked back up to monitor at this time  Vitals taken and were stable  Will continue to monitor

## 2022-03-18 NOTE — PHYSICAL THERAPY NOTE
Physical Therapy Evaluation   Time in: 1031  Time out: 1053  Total evaluation time: 22 minutes    Patient's Name: Demetra Montano    Admitting Diagnosis  Stroke-like symptom [R29 90]  ICAO (internal carotid artery occlusion), left [I65 22]  Symptoms of cerebrovascular accident (CVA) [R09 89]    Problem List  Patient Active Problem List   Diagnosis    Pure hypercholesterolemia    Sensorineural hearing loss (SNHL) of both ears    Eustachian tube dysfunction, right    Hypertension    Arthritis    Primary osteoarthritis of left knee    Primary osteoarthritis of both knees    Persistent proteinuria    Neuropathy of ankle, left    Hypokalemia    ICAO (internal carotid artery occlusion), left    GERD (gastroesophageal reflux disease)    A-fib ([facility] )       Past Medical History  Past Medical History:   Diagnosis Date    Benign essential hypertension     Cancer ([facility] )     rectal and sigmoid colon     Carcinoma in situ     Carotid artery occlusion     Disorder of rectum and anus     Disorder of skin     Diverticular disease of colon     Edema     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 ([facility] )     Essential hypertension     Gastroesophageal reflux disease     Herpes zoster without complication     Hyperlipidemia     Hypokalemia     Idiopathic peripheral neuropathy     Iron deficiency anemia     Localized, primary osteoarthritis     Lower leg    Malaise and fatigue     Mixed hyperlipidemia     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     Post-herpetic polyneuropathy     Unspecified osteoarthritis, unspecified site     Vitamin D deficiency        Past Surgical History  Past Surgical History:   Procedure Laterality Date    COLON SURGERY      Colon resection with open surgery     COLONOSCOPY  2013    Positive for tumor     HYSTERECTOMY      OTHER SURGICAL HISTORY      fibrous tumor    RECTAL SURGERY  09/2013    tumor       PT performed at least 2 patient identifiers during session: Name and wristband  03/18/22 1031   PT Last Visit   PT Visit Date 03/18/22   Note Type   Note type Evaluation   Pain Assessment   Pain Assessment Tool 0-10   Pain Score No Pain   Pain Rating: FLACC (Rest) - Face 0   Pain Rating: FLACC (Rest) - Legs 0   Pain Rating: FLACC (Rest) - Activity 0   Pain Rating: FLACC (Rest) - Cry 0   Pain Rating: FLACC (Rest) - Consolability 0   Score: FLACC (Rest) 0   Pain Rating: FLACC (Activity) - Face 0   Pain Rating: FLACC (Activity) - Legs 0   Pain Rating: FLACC (Activity) - Activity 0   Pain Rating: FLACC (Activity) - Cry 0   Pain Rating: FLACC (Activity) - Consolability 0   Score: FLACC (Activity) 0   Restrictions/Precautions   Weight Bearing Precautions Per Order No   Other Precautions Fall Risk;Multiple lines;Telemetry;Hard of hearing   Home Living   Type of [facility] One level;Performs ADLs on one level; Able to live on main level with bedroom/bathroom;Stairs to enter with rails  (4 YAYO)   Bathroom Shower/Tub Tub/shower unit  (pt sponge bathes at baseline)   Bathroom Toilet Raised   Bathroom Equipment Grab bars around toilet;Grab bars in shower   [address] lift  (pt reports use of w/c at baseline)   Additional Comments pt reports use of lift with dtr assistance   Prior Function   Level of [place] Needs assistance with ADLs and functional mobility; Needs assistance with IADLs   Lives With Daughter   Receives Help From Family   ADL Assistance Needs assistance   IADLs Needs assistance   Falls in the last 6 months   (unknown -pt unable to report)   Comments pt is questionable historian d/t confusion   General   Family/Caregiver Present No   Cognition   Arousal/Participation Alert   Attention Attends with cues to redirect  (pt reports president = Clif)   Orientation Level Oriented to person;Oriented to place; Disoriented to time;Disoriented to situation  (able to recall being in "hospital")   Memory Decreased recall of biographical information;Decreased short term memory;Decreased recall of recent events;Decreased recall of precautions   Following Commands Follows one step commands with increased time or repetition   Comments pt agreeable to PT session   RLE Assessment   RLE Assessment X   Strength RLE   RLE Overall Strength 3-/5   LLE Assessment   LLE Assessment X   Strength LLE   LLE Overall Strength 3/5   Coordination   Movements are Fluid and Coordinated 0   Sensation   (unable to perform formal MMT d/t pt status)   Bed Mobility   Supine to Sit 3  Moderate assistance   Additional items Assist x 1;HOB elevated; Increased time required;LE management   Additional Comments BP prior to OOB mobility = 199/94, HR = 95 bpm at rest, SpO2 on RA = 94%   Transfers   Sit to Stand 4  Minimal assistance   Additional items Assist x 2; Increased time required;Armrests   Stand to Sit 4  Minimal assistance   Additional items Assist x 2;Armrests   Ambulation/Elevation   Gait pattern Improper Weight shift;Decreased foot clearance;Decreased L stance;Shuffling; Step to;Excessively slow   Gait Assistance   (min/mod)   Additional items Assist x 2   Assistive Device Rolling walker   Distance 3 ft from bed>recliner   Stair Management Assistance Not tested   Balance   Static Sitting Fair +   Dynamic Sitting Fair -   Static Standing Poor +   Dynamic Standing Poor   Ambulatory Poor   Endurance Deficit   Endurance Deficit Yes   Activity Tolerance   Activity Tolerance Patient limited by fatigue   Nurse Made Aware Yes, RN made aware of outcomes/recs   Assessment   Prognosis Fair   Problem List Decreased strength;Decreased endurance; Impaired balance;Decreased mobility; Decreased safety awareness; Impaired judgement;Decreased cognition   Barriers to Discharge Other (Comment)  (unknown barriers)   Goals   Patient Goals none expressed by pt re: therapy outcomes   STG Expiration Date 03/28/22 Short Term Goal #1 In 7-10 days: Increase bilateral LE strength 1/2 grade to facilitate independent mobility, Perform all bed mobility tasks with close S to decrease caregiver burden, Perform all transfers with close S to improve independence, Ambulate > 25 ft  with least restrictive assistive device with close S w/o LOB and w/ normalized gait pattern 100% of the time, Increase all balance 1/2 grade to decrease risk for falls, Tolerate 4 hr OOB to faciliate upright tolerance and Complete % of the time   PT Treatment Day 0   Plan   Treatment/Interventions Functional transfer training;LE strengthening/ROM; Therapeutic exercise; Endurance training;Cognitive reorientation;Patient/family training;Equipment eval/education; Bed mobility;Gait training;Spoke to nursing;Spoke to case management;OT   PT Frequency 4-6x/wk   Recommendation   PT Discharge Recommendation Post acute rehabilitation services   Equipment Recommended   (continue RW use)   Additional Comments Pt's raw score on the -[facility] Basic Mobility inpatient short form is 12, standardized score is 32 23  Patients at this level are likely to benefit from DC to [employer] [facility], however, please refer to therapist recommendation for safe DC planning  [facility] Basic Mobility Inpatient   Turning in Bed Without Bedrails 3   Lying on Back to Sitting on Edge of Flat Bed 2   Moving Bed to Chair 2   Standing Up From Chair 2   Walk in Room 2   Climb 3-5 Stairs 1   Basic Mobility Inpatient Raw Score 12   Basic Mobility Standardized Score 32 23   Highest Level Of Mobility   JH-HLM Goal 4: Move to chair/commode   JH-HLM Highest Level of Mobility 4: Move to chair/commode   JH-HLM Goal Achieved Yes   End of Consult   Patient Position at End of Consult Bedside chair;Bed/Chair alarm activated; All needs within reach       Indio Sis, PT, DPT    ASSESSMENT: Pt is 80 y o  female seen for high-complexity PT evaluation on 3/18/2022 s/p admit to [facility] on 3/17/2022 w/ ICAO (internal carotid artery occlusion), left; pt presented to ED with R sided weakness and dysarthria  Diagnosed with L MCA syndrome after imaging and neuro evaluation  PT was consulted to assess pt's functional mobility and d/c needs  Order placed for PT eval and tx, w/ up w/ A order  Pt is questionable historian, PT unsure of home setup and PLOF at this time  At time of eval, pt requires min/mod A for all mobility tasks, able to sequence short amb trial with use of RW with max A for RW management  Upon evaluation, pt presenting with impaired functional mobility d/t decreased strength, decreased endurance, impaired balance, decreased mobility, decreased cognition, impaired judgement and decreased safety awareness  Pertinent PMHx and current co-morbidities affecting pt's physical performance at time of assessment include: AFib, HTN, GERD  Personal factors affecting pt at time of eval include: decreased initiation and engagement, limited insight into impairments and advanced age  The following objective measures performed on IE also reveal limitations: AM-PAC 6-Clicks: 27/31  Pt's clinical presentation is currently unstable/unpredictable seen in pt's presentation of elevated BP readings, decreased insight and increased time for processing  Overall, pt's rehab potential and prognosis to return to PLOF is fair as impacted by objective findings, warranting pt to receive further skilled PT interventions to address identified impairments, activity limitation(s), and participation restriction(s)  Pt to benefit from continued PT tx to address deficits as defined above and maximize level of functional independent mobility and consistency in order for pt to improve safety with OOB activity  From PT/mobility standpoint, recommendation at time of d/c would be post acute rehabilitation services pending progress in order to facilitate return to PLOF

## 2022-03-18 NOTE — CASE MANAGEMENT
Case Management Assessment    Patient name [id number]  [facility]   Location ICU 06/ICU  MRN [id number]  : 3/29/1921 Date 3/18/2022       Current Admission Date: 3/17/2022  Current Admission Diagnosis:ICAO (internal carotid artery occlusion), left   Patient Active Problem List    Diagnosis Date Noted    ICAO (internal carotid artery occlusion), left 2022    GERD (gastroesophageal reflux disease) 2022    A-fib ([address] ) 2022    Hypokalemia 2021    Neuropathy of ankle, left 2021    Persistent proteinuria 2021    Primary osteoarthritis of both knees 2020    Primary osteoarthritis of left knee 2020    Eustachian tube dysfunction, right 2020    Hypertension 2020    Arthritis 2020    Pure hypercholesterolemia 2019    Sensorineural hearing loss (SNHL) of both ears 2019      LOS (days): 1  Geometric Mean LOS (GMLOS) (days): 2 90  Days to GMLOS:2 1     OBJECTIVE:    Risk of Unplanned Readmission Score: 11     Current admission status: Inpatient  Preferred Pharmacy:   [facility] [place] [id number]  [address] [place] [address],[address] [place] [address] Road  Phone: [phone] Fax: [phone]    Primary Care Provider: Vladimir Martines MD    Primary Insurance: MEDICARE  Secondary Insurance: [address] [facility] Proxies    There are no active Health Care Proxies on file         Advance Directives  Does patient have a Health Care POA?: Yes  Does patient have Advance Directives?: Yes  Advance Directives: Living will,Power of  for health care  Primary Contact: Rafat Kaufman Dtr  Readmission Root Cause  30 Day Readmission: No    Patient Information  Admitted from[de-identified] Home  Mental Status: Alert  During Assessment patient was accompanied by: Not accompanied during assessment  Assessment information provided by[de-identified] Patient  Primary Caregiver: Self  Support Systems: Daughter  [place] of Residence: [address] do you live in?: [address] entry access options   Select all that apply : No steps to enter home  Type of Current Residence: [employer]  In the last 12 months, was there a time when you were not able to pay the mortgage or rent on time?: No  In the last 12 months, how many places have you lived?: 1  In the last 12 months, was there a time when you did not have a steady place to sleep or slept in a shelter (including now)?: No  Homeless/housing insecurity resource given?: N/A  Living Arrangements: Lives w/ Daughter  Is patient a ?: No    Activities of Daily Living Prior to Admission  Functional Status: Independent  Completes ADLs independently?: Yes  Ambulates independently?: Yes  Does patient use assisted devices?: Yes  Assisted Devices (DME) used: Straight Cane  Does patient currently own DME?: Yes  What DME does the patient currently own?: Deny Jhaveri  Does patient have a history of Outpatient Therapy (PT/OT)?: No  Does the patient have a history of Short-Term Rehab?: No  Does patient have a history of HHC?: No  Does patient currently have [address]?: No    Patient Information Continued  Income Source: Pension/senior living  Does patient have prescription coverage?: Yes  Within the past 12 months, you worried that your food would run out before you got the money to buy more : Never true  Within the past 12 months, the food you bought just didnt last and you didnt have money to get more : Never true  Food insecurity resource given?: N/A  Does patient receive dialysis treatments?: No  Does patient have a history of substance abuse?: No  Does patient have a history of Mental Health Diagnosis?: No    PHQ 2/9 Screening   Reviewed PHQ 2/9 Depression Screening Score?: No    Means of Transportation  Means of Transport to Appts[de-identified] Family transport  In the past 12 months, has lack of transportation kept you from medical appointments or from getting medications?: No  In the past 12 months, has lack of transportation kept you from meetings, work, or from getting things needed for daily living?: No  Was application for public transport provided?: N/A   TC to pt daughter Danish Fitch to verify information  Left a VM

## 2022-03-18 NOTE — ASSESSMENT & PLAN NOTE
· Patient developed speech difficulties morning of 3/17 with multiple subsequent falls prompting medical evaluation  · Stroke alert in ED  · 3/17 CTH: No acute intracranial hemorrhage  Right frontal centrum semiovale age-indeterminate ischemia/infarct  Microangiopathic changes  · 3/17 CTA H/N: Occluded left cervical ICA and intracranial ICA  Reconstitution in the left supraclinoid /ICA terminus   Diminutive/decreased flow left MCA and decreased flow distal branches  · Neurology evaluated - not a candidate for tPA given chronicity of symptoms; not an candidate for endovascular intervention given re-constitution of blood vessels intra-cranially   · Admit to ICU  · Initiate stroke pathway  · q1h neuro checks   · PT/OT/Speech/PMR  · Repeat CTH in AM  · MRI brain  · Echocardiogram  · Lipid panel, HbA1c  · Goal -220

## 2022-03-19 NOTE — ASSESSMENT & PLAN NOTE
· CTA shows occluded left cervical ICA and intracranial ICA  · Neuro discussed with endovascular team and patient is not a candidate for any intervention  · Will continue aspirin  · Hospice evaluation

## 2022-03-19 NOTE — SPEECH THERAPY NOTE
Speech Language/Pathology    Speech/Language Pathology Progress Note    Patient Name: Auutmn Olivia  UACGZ'T Date: 3/19/2022     Problem List  Principal Problem:    Acute embolic stroke [facility])  Active Problems:    Pure hypercholesterolemia    Hypertension    GERD (gastroesophageal reflux disease)    A-fib ([facility] [address] )    ICAO (internal carotid artery occlusion), left       Past Medical History  Past Medical History:   Diagnosis Date    Benign essential hypertension     [facility]     rectal and sigmoid colon     Carcinoma in situ     Carotid artery occlusion     Disorder of rectum and anus     Disorder of skin     Diverticular disease of colon     Edema     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)     Essential hypertension     Gastroesophageal reflux disease     Herpes zoster without complication     Hyperlipidemia     Hypokalemia     Idiopathic peripheral neuropathy     Iron deficiency anemia     Localized, primary osteoarthritis     Lower leg    Malaise and fatigue     Mixed hyperlipidemia     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     Post-herpetic polyneuropathy     Unspecified osteoarthritis, unspecified site     Vitamin D deficiency         Past Surgical History  Past Surgical History:   Procedure Laterality Date    COLON SURGERY      Colon resection with open surgery     COLONOSCOPY  2013    Positive for tumor     HYSTERECTOMY      OTHER SURGICAL HISTORY      fibrous tumor    RECTAL SURGERY  09/2013    tumor         Subjective:  Pt was received reclined in bed, CNA in room as lunch had just arrived  CNA and SLP adjusted pt's positioning in bed to ensure upright positioning for oral intake  CNA reports she was not present for pt's breakfast and it appears pt did not consume much  Pt currently on mechanical soft solids and nectar thick liquids      Objective:  Pt was assessed with lunch tray of mechanical soft solids and thin liquids consisting of the following: turkey and gravy, mashed potatoes, broccoli, and nectar thick juice  Pt attempted to feed self however exhibited difficulty 2/2 R sided weakness  SLP fed pt at slow rate, alternating between solids and liquid to increase clearance  Mildly prolonged oral processing of solids however pt demonstrated adequate oral clearance given additional time as well as presentations of liquid wash  Swallow initiation was occasionally prolonged (greater than 20 seconds) and pt presented with inconsistent coughing following nectar via straw or cup  SLP brought honey thick to trial via cup, to which pt was able to self-administer small cup sips  No overt coughing after the swallow with honey thick liquids  Assessment:  Pt presenting w inconsistent weak coughing after the swallow with nectar thick liquids via cup/straw during meal  No overt coughing given honey thick liquids via cup  Pt demonstrating mildly prolonged oral processing of solids and benefits from liquid wash to assist with clearance  Plan/Recommendations:  Downgrade to honey thick liquids, continue with mechanical soft solids  ST to follow

## 2022-03-19 NOTE — ASSESSMENT & PLAN NOTE
· Blood pressure has been elevated, patient was off medications to allow for permissive hypertension  · Will initiate verapamil today and monitor

## 2022-03-19 NOTE — ASSESSMENT & PLAN NOTE
Will continue aspirin for now  Patient has refused full-dose anticoagulation in the past   After speaking to daughter at bedside will continue with aspirin    Hospice evaluation pending  · Home verapamil dose resumed for rate control

## 2022-03-19 NOTE — PLAN OF CARE
VSS, afebrile  Bps high, this is known and allowed  A/O to person only  Mumbles words, answers appropriately with nods of head most of time  Remains on tele  SPO2 above 90% on RA  Incontinent of urine this shift  Purewick in place  Repositioning provided during night, no new skin issues  No complaints of pain  Neuros unchanged, right side deficit remains     Problem: PAIN - ADULT  Goal: Verbalizes/displays adequate comfort level or baseline comfort level  Description: Interventions:  - Encourage patient to monitor pain and request assistance  - Assess pain using appropriate pain scale  - Administer analgesics based on type and severity of pain and evaluate response  - Implement non-pharmacological measures as appropriate and evaluate response  - Consider cultural and social influences on pain and pain management  - Notify physician/advanced practitioner if interventions unsuccessful or patient reports new pain  Outcome: Progressing     Problem: INFECTION - ADULT  Goal: Absence or prevention of progression during hospitalization  Description: INTERVENTIONS:  - Assess and monitor for signs and symptoms of infection  - Monitor lab/diagnostic results  - Monitor all insertion sites, i e  indwelling lines, tubes, and drains  - Monitor endotracheal if appropriate and nasal secretions for changes in amount and color  - [place] appropriate cooling/warming therapies per order  - Administer medications as ordered  - Instruct and encourage patient and family to use good hand hygiene technique  - Identify and instruct in appropriate isolation precautions for identified infection/condition  Outcome: Progressing     Problem: SAFETY ADULT  Goal: Patient will remain free of falls  Description: INTERVENTIONS:  - Educate patient/family on patient safety including physical limitations  - Instruct patient to call for assistance with activity   - Consult OT/PT to assist with strengthening/mobility   - Keep Call bell within reach  - Keep bed low and locked with side rails adjusted as appropriate  - Keep care items and personal belongings within reach  - Initiate and maintain comfort rounds  - Make Fall Risk Sign visible to staff  - Offer Toileting every 2 Hours, in advance of need  - Initiate/Maintain fall alarm  - Obtain necessary fall risk management equipment: fall alarm  - Apply yellow socks and bracelet for high fall risk patients  - Consider moving patient to room near nurses station  Outcome: Progressing  Goal: Maintain or return to baseline ADL function  Description: INTERVENTIONS:  -  Assess patient's ability to carry out ADLs; assess patient's baseline for ADL function and identify physical deficits which impact ability to perform ADLs (bathing, care of mouth/teeth, toileting, grooming, dressing, etc )  - Assess/evaluate cause of self-care deficits   - Assess range of motion  - Assess patient's mobility; develop plan if impaired  - Assess patient's need for assistive devices and provide as appropriate  - Encourage maximum independence but intervene and supervise when necessary  - Involve family in performance of ADLs  - Assess for home care needs following discharge   - Consider OT consult to assist with ADL evaluation and planning for discharge  - Provide patient education as appropriate  Outcome: Progressing  Goal: Maintains/Returns to pre admission functional level  Description: INTERVENTIONS:  - Perform BMAT or MOVE assessment daily    - Set and communicate daily mobility goal to care team and patient/family/caregiver  - Collaborate with rehabilitation services on mobility goals if consulted  - Perform Range of Motion 3 times a day  - Reposition patient every 2 hours    - Dangle patient 3 times a day  - Stand patient 3 times a day  - Ambulate patient 3 times a day  - Out of bed to chair 3 times a day   - Out of bed for meals 3 times a day  - Out of bed for toileting  - Record patient progress and toleration of activity level Outcome: Progressing     Problem: DISCHARGE PLANNING  Goal: Discharge to home or other facility with appropriate resources  Description: INTERVENTIONS:  - Identify barriers to discharge w/patient and caregiver  - Arrange for needed discharge resources and transportation as appropriate  - Identify discharge learning needs (meds, wound care, etc )  - Arrange for interpretive services to assist at discharge as needed  - Refer to Case Management Department for coordinating discharge planning if the patient needs post-hospital services based on physician/advanced practitioner order or complex needs related to functional status, cognitive ability, or social support system  Outcome: Progressing     Problem: Knowledge Deficit  Goal: Patient/family/caregiver demonstrates understanding of disease process, treatment plan, medications, and discharge instructions  Description: Complete learning assessment and assess knowledge base    Interventions:  - Provide teaching at level of understanding  - Provide teaching via preferred learning methods  Outcome: Progressing     Problem: MOBILITY - ADULT  Goal: Maintain or return to baseline ADL function  Description: INTERVENTIONS:  -  Assess patient's ability to carry out ADLs; assess patient's baseline for ADL function and identify physical deficits which impact ability to perform ADLs (bathing, care of mouth/teeth, toileting, grooming, dressing, etc )  - Assess/evaluate cause of self-care deficits   - Assess range of motion  - Assess patient's mobility; develop plan if impaired  - Assess patient's need for assistive devices and provide as appropriate  - Encourage maximum independence but intervene and supervise when necessary  - Involve family in performance of ADLs  - Assess for home care needs following discharge   - Consider OT consult to assist with ADL evaluation and planning for discharge  - Provide patient education as appropriate  Outcome: Progressing  Goal: Maintains/Returns to pre admission functional level  Description: INTERVENTIONS:  - Perform BMAT or MOVE assessment daily    - Set and communicate daily mobility goal to care team and patient/family/caregiver  - Collaborate with rehabilitation services on mobility goals if consulted  - Perform Range of Motion 3 times a day  - Reposition patient every 2 hours    - Dangle patient 3 times a day  - Stand patient 3 times a day  - Ambulate patient 3 times a day  - Out of bed to chair 3 times a day   - Out of bed for meals 3 times a day  - Out of bed for toileting  - Record patient progress and toleration of activity level   Outcome: Progressing     Problem: Prexisting or High Potential for Compromised Skin Integrity  Goal: Skin integrity is maintained or improved  Description: INTERVENTIONS:  - Identify patients at risk for skin breakdown  - Assess and monitor skin integrity  - Assess and monitor nutrition and hydration status  - Monitor labs   - Assess for incontinence   - Turn and reposition patient  - Assist with mobility/ambulation  - Relieve pressure over bony prominences  - Avoid friction and shearing  - Provide appropriate hygiene as needed including keeping skin clean and dry  - Evaluate need for skin moisturizer/barrier cream  - Collaborate with interdisciplinary team   - Patient/family teaching  - Consider wound care consult   Outcome: Progressing

## 2022-03-19 NOTE — PROGRESS NOTES
[facility]  Progress Note - Hilary Pert 3/29/1921, 80 y o  female MRN: [id number]  Unit/Bed#: -01 Encounter: [id number]  Primary Care Provider: Daniel Rodriguez MD   Date and time admitted to hospital: 3/17/2022  7:19 PM    * Acute embolic stroke [facility])  Assessment & Plan  · MRI shows multiple small acute infarcts less than 1 cm in size  · Patient with significant right-sided weakness upper and lower extremity  · Patient with history of AFib, has refused anticoagulation in the past   Patient is on aspirin at home  · Spoke with patient's daughter at bedside at this time given patient's current situation and frailty will hold off on full-dose anticoagulation  · Daughter is opting for hospice care, will notify case management  · Continue statin and aspirin  · Patient with difficulty swallowing, cleared for mechanical solid foods with honey thick liquids    ICAO (internal carotid artery occlusion), left  Assessment & Plan  · CTA shows occluded left cervical ICA and intracranial ICA  · Neuro discussed with endovascular team and patient is not a candidate for any intervention  · Will continue aspirin  · Hospice evaluation    A-fib [facility])  Assessment & Plan  Will continue aspirin for now  Patient has refused full-dose anticoagulation in the past   After speaking to daughter at bedside will continue with aspirin  Hospice evaluation pending  · Home verapamil dose resumed for rate control    GERD (gastroesophageal reflux disease)  Assessment & Plan  PPI    Hypertension  Assessment & Plan  · Blood pressure has been elevated, patient was off medications to allow for permissive hypertension  · Will initiate verapamil today and monitor    Pure hypercholesterolemia  Assessment & Plan  · Continue statin    VTE Prophylaxis:  Heparin    Patient Centered Rounds: I have performed bedside rounds with nursing staff today      Discussions with Specialists or Other Care Team Provider: yes  Education and Discussions with Family / Patient:  Extensive discussion with daughter at bedside regarding plan of care and goals of care    Current Length of Stay: 2 day(s)    Current Patient Status: Inpatient   Certification Statement: The patient will continue to require additional inpatient hospital stay due to Stroke    Discharge Plan:  Pending hospital course  Hospice evaluation pending    Code Status: Level 3 - DNAR and DNI    Subjective:   No overnight events noted  Patient with significant right-sided weakness  Physical therapy recommended rehab    Objective:     Vitals:   Temp (24hrs), Av °F (36 7 °C), Min:97 4 °F (36 3 °C), Max:98 5 °F (36 9 °C)    Temp:  [97 4 °F (36 3 °C)-98 5 °F (36 9 °C)] 98 5 °F (36 9 °C)  HR:  [] 114  Resp:  [17-22] 20  BP: (132-181)/() 181/114  SpO2:  [96 %-97 %] 96 %  Body mass index is 30 kg/m²  Input and Output Summary (last 24 hours): Intake/Output Summary (Last 24 hours) at 3/19/2022 1644  Last data filed at 3/19/2022 0841  Gross per 24 hour   Intake --   Output 300 ml   Net -300 ml       Physical Exam:   Physical Exam  Constitutional:       Comments: Frail elderly female  Drowsy but arousable   HENT:      Head: Normocephalic and atraumatic  Nose: Nose normal       Mouth/Throat:      Mouth: Mucous membranes are dry  Eyes:      Conjunctiva/sclera: Conjunctivae normal    Cardiovascular:      Rate and Rhythm: Normal rate and regular rhythm  Pulmonary:      Effort: Pulmonary effort is normal  No respiratory distress  Abdominal:      Palpations: Abdomen is soft  Tenderness: There is no abdominal tenderness  Musculoskeletal:      Cervical back: Neck supple  Comments: Generalized weakness noted   Skin:     General: Skin is warm and dry  Neurological:      Comments: Patient is drowsy but arousable  Significant right upper and lower extremity weakness noted    2/5 strength in upper and lower extremity   Psychiatric:         Behavior: Behavior normal       Comments: Unable to assess mood         Additional Data:     Labs:    Results from last 7 days   Lab Units 03/19/22  0453   WBC Thousand/uL 9 93   HEMOGLOBIN g/dL 12 9   HEMATOCRIT % 37 0   PLATELETS Thousands/uL 225     Results from last 7 days   Lab Units 03/19/22  0453   SODIUM mmol/L 134*   POTASSIUM mmol/L 3 5   CHLORIDE mmol/L 99   CO2 mmol/L 29   BUN mg/dL 15   CREATININE mg/dL 0 64   CALCIUM mg/dL 8 4     Results from last 7 days   Lab Units 03/17/22  1935   INR  1 09     Results from last 7 days   Lab Units 03/17/22  1932   POC GLUCOSE mg/dl 141*     Results from last 7 days   Lab Units 03/18/22  0612   HEMOGLOBIN A1C % 5 5       * I Have Reviewed All Lab Data Listed Above  * Additional Pertinent Lab Tests Reviewed: [facility] Admission  Reviewed    Imaging:  Imaging Reports Reviewed Today Include:  No new imaging    Recent Cultures (last 7 days):           Last 24 Hours Medication List:   Current Facility-Administered Medications   Medication Dose Route Frequency Provider Last Rate    acetaminophen  650 mg Oral Q6H PRN Leslie Ivonne, CRNP      aspirin  81 mg Oral Daily Leslie Ivonne, CRNP      atorvastatin  40 mg Oral QPM LeslieLOKESH NealNP      heparin (porcine)  5,000 Units Subcutaneous [facility] Leslie Ivonne, CRNP      ondansetron  4 mg Intravenous Q6H PRN Leslie Ivonne, CRNP      pantoprazole  40 mg Oral Early Morning Leslie Ivonne, CRNP      polyethylene glycol  17 g Oral Daily Leslie Ivonne, CRNP      verapamil  180 mg Oral Daily Ricky Mcdonadl MD          Today, Patient Was Seen By: Ricky Mcdonald MD    ** Please Note: Dictation voice to text software may have been used in the creation of this document   **

## 2022-03-19 NOTE — ASSESSMENT & PLAN NOTE
One [de-identified] year old female with a history of hypertension, atrial fibrillation on aspirin only, who was admitted after stroke alerted for acute onset of right-sided weakness and aphasia  Patient initial NIH stroke score upon admission was 12  She is not a candidate for tPA administration due to out time window  Her CTA head and neck demonstrated left ICA occlusion  Per Neurosurgery, not indication for thrombectomy  MRI demonstrated: There are multiple small acute infarcts scattered within the brain parenchyma involving multiple different vascular territories without mass effect or hemorrhagic transformation, all less than 1 cm in size "    Impression:  Multi foci acute infarct, embolic, likely cardioembolic due to atrial fibrillation  -will continue to keep systolic blood pressure > 180, may gradually decreased started to above 160  -discussed with Neurosurgery, Dr Zev Norman today  No indication for endovascular intervention  -continue aspirin and atorvastatin  Per chart review, patient has a history of atrial fibrillation but declined anticoagulation in the past due to frequent fall  However, patient most likely had acute stroke due to atrial fibrillation  Therefore, anticoagulation is still indicated  We will defer the decision to the primary team and the patient/family  -will need to follow up with Neurology as outpatient 6 weeks after discharge

## 2022-03-19 NOTE — TELEMEDICINE
TeleConsultation - Stroke   Agatha Asencio 80 y o  female MRN: [id number]  Unit/Bed#: -01 Encounter: [id number]        REQUIRED DOCUMENTATION:     1  This service was provided via Telemedicine  2  Provider located at [facility]  3  TeleMed provider: Xavi Nicole MD   4  Identify all parties in room with patient during tele consult: 5  Patient was then informed that this was a Telemedicine visit and that the exam was being conducted confidentially over secure lines  My office door was closed  No one else was in the room  Patient acknowledged consent and understanding of privacy and security of the Telemedicine visit, and gave us permission to have the assistant stay in the room in order to assist with the history and to conduct the exam   I informed the patient that I have reviewed their record in Epic and presented the opportunity for them to ask any questions regarding the visit today  The patient agreed to participate  Assessment/Plan   Assessment:     Acute embolic stroke   One [de-identified] year old female with a history of hypertension, atrial fibrillation on aspirin only, who was admitted after stroke alerted for acute onset of right-sided weakness and aphasia  Patient initial NIH stroke score upon admission was 12  She is not a candidate for tPA administration due to out time window  Her CTA head and neck demonstrated left ICA occlusion  Per Neurosurgery, not indication for thrombectomy  MRI demonstrated: There are multiple small acute infarcts scattered within the brain parenchyma involving multiple different vascular territories without mass effect or hemorrhagic transformation, all less than 1 cm in size "    Impression:  Multi foci acute infarct, embolic, likely cardioembolic due to atrial fibrillation  -will continue to keep systolic blood pressure > 180, may gradually decreased started to above 160  -discussed with Neurosurgery, Dr Oskar Burris today    No indication for endovascular intervention  -continue aspirin and atorvastatin  Per chart review, patient has a history of atrial fibrillation but declined anticoagulation in the past due to frequent fall  However, patient most likely had acute stroke due to atrial fibrillation  Therefore, anticoagulation is still indicated  We will defer the decision to the primary team and the patient/family  -will need to follow up with Neurology as outpatient 6 weeks after discharge  Acute left ICA occlusion,  - endovascular surgeon decided no indication for intervention at this time   -possible etiology is due to cardioembolic  -patient is on aspirin she can continue  However, if patient agree with anticoagulation she is also candidate  Atrial fibrillation  -patient has no history of atrial fibrillation    -she is on aspirin only due to frequent fall history  -since patient just had acute cardioembolic stroke, will defer the decision to patient and her family regarding start anticoagulation  Cristina Apodaca will need follow up in in 6 weeks with neurovascular attending or advance practitioner  She will not require outpatient neurological testing  History of Present Illness     Reason for Consult / Principal Problem:   Hx and PE limited by: telemedicine      HPI: Cristina Apodaca is a 80 y o  right handed female with hx of HTN, Afib on ASA only, who was stroke alerted on 3/17 for  aphasia and R side weakness  Neuro was consulted on 3/18 for this  History obtained from patient and records  Per records, [address] 0930 on 3/17  Reported acute onset aphasia and R side weakness  NIHSS=12  Patient was determined not a candidate for tPA  Per record, neurosurgery determined that patient was not a candidate for intervention  Patient was admitted for close monitoring  Her BP was kept SBP>180 to keep cerebral perfusion  Patient's neuro exam is stable overnight  Review of Systems   Constitutional: Negative  HENT: Negative  Eyes: Negative  Respiratory: Negative  Cardiovascular: Negative  Neurological: Positive for speech difficulty and weakness  Psychiatric/Behavioral: Negative  Historical Information   Past Medical History:   Diagnosis Date    Benign essential hypertension     Cancer ([facility] [address] )     rectal and sigmoid colon     Carcinoma in situ     Carotid artery occlusion     Disorder of rectum and anus     Disorder of skin     Diverticular disease of colon     Edema     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)     Essential hypertension     Gastroesophageal reflux disease     Herpes zoster without complication     Hyperlipidemia     Hypokalemia     Idiopathic peripheral neuropathy     Iron deficiency anemia     Localized, primary osteoarthritis     Lower leg    Malaise and fatigue     Mixed hyperlipidemia     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     Post-herpetic polyneuropathy     Unspecified osteoarthritis, unspecified site     Vitamin D deficiency      Past Surgical History:   Procedure Laterality Date    COLON SURGERY      Colon resection with open surgery     COLONOSCOPY  2013    Positive for tumor     HYSTERECTOMY      OTHER SURGICAL HISTORY      fibrous tumor    RECTAL SURGERY  09/2013    tumor     Social History   Social History     Substance and Sexual Activity   Alcohol Use Not Currently    Comment: Denies alcohol use - As per [employer]      Social History     Substance and Sexual Activity   Drug Use Never    Comment: Denies drug use - As per [address]      E-Cigarette/Vaping    E-Cigarette Use Never User      E-Cigarette/Vaping Substances     Social History     Tobacco Use   Smoking Status Never Smoker   Smokeless Tobacco Never Used     Family History: non-contributory    Review of previous medical records was completed       Meds/Allergies   all current active meds have been reviewed    Allergies   Allergen Reactions    Cephalosporins Other (See Comments)     Per pharmacy       Objective   Vitals:Blood pressure (!) 170/103, pulse (!) 112, temperature 98 3 °F (36 8 °C), temperature source Oral, resp  rate 20, height 4' 8" (1 422 m), weight 59 9 kg (132 lb), SpO2 97 %  ,Body mass index is 29 59 kg/m²  Intake/Output Summary (Last 24 hours) at 3/18/2022 2223  Last data filed at 3/18/2022 1300  Gross per 24 hour   Intake 773 83 ml   Output 1450 ml   Net -676 17 ml       Invasive Devices: Invasive Devices  Report    Peripheral Intravenous Line            Peripheral IV 22 Left Antecubital 1 day    Peripheral IV 22 Right Antecubital 1 day          Drain            External Urinary Catheter <1 day              GEN: in no acute distress, well-developed, well nourished  HEENT: normocephalic,  Nose and ears grossly normal in appearance  CV:  no pedal edema  Normotensive  PULM: airways patent, non-labored breathing   ABD:  Nondistended  EXT: no   edema or erythema  No joint swelling  SKIN: no rashes or lesions  NEURO:        Mental Status: Alert and awake, not able to answer question but can follow commands  Speech: severe expressive aphasia         CN 2-12: grossly intact       Motor: mild weakness at R side                       Sensory:  Reported intact light touch and pinprick throughout        Reflexes:  Not able to assess during tele visit       Coordination: no ataxia with finger-to-nose and heel-to-shin testing             Gait/Station: Deferred        Cortical: No Extinction    NIHSS:  1a Level of Consciousness: 0 = Alert   1b  LOC Questions: 2 = Answers neither correctly   1c  LOC Commands: 0 = Obeys both correctly   2  Best Gaze: 0 = Normal   3  Visual: 0 = No visual field loss   4  Facial Palsy: 0   5a  Motor Right Arm: 0   5b  Motor Left Arm: 0   6a  Motor Right Le   6b  Motor Left Le   7  Limb Ataxia:  0   8  Sensory: 0   9  Best Language:  2   10  Dysarthria: 2   11   Extinction and Inattention (formerly Neglect): 0   Total Score: 9       Modified Norton Score:  2 (Unable to carry out all previous activities, but able to look after own affairs without assistance)    Lab Results:   CBC:   Results from last 7 days   Lab Units 03/18/22 0612 03/17/22 1935   WBC Thousand/uL 11 98* 12 40*   RBC Million/uL 4 36 4 37   HEMOGLOBIN g/dL 13 4 13 7   HEMATOCRIT % 41 4 39 7   MCV fL 95 91   PLATELETS Thousands/uL 246 262   , BMP/CMP:   Results from last 7 days   Lab Units 03/18/22 0612 03/17/22 1935   SODIUM mmol/L 134* 134*   POTASSIUM mmol/L 3 3* 3 6   CHLORIDE mmol/L 97 97   CO2 mmol/L 27 27   BUN mg/dL 13 20   CREATININE mg/dL 0 47* 0 68   CALCIUM mg/dL 8 6 9 1   EGFR ml/min/1 73sq m 81 72   , Vitamin B12:   , HgBA1C:   Results from last 7 days   Lab Units 03/18/22 0612   HEMOGLOBIN A1C % 5 5   , TSH:   , Coagulation:   Results from last 7 days   Lab Units 03/17/22 1935   INR  1 09   , Lipid Profile:   Results from last 7 days   Lab Units 03/18/22 0612   HDL mg/dL 82   LDL CALC mg/dL 95   TRIGLYCERIDES mg/dL 59   , Ammonia:   , Urinalysis:       Invalid input(s): URIBILINOGEN, Drug Screen:     Imaging Studies: I have personally reviewed pertinent films in PACS  EKG, Pathology, and Other Studies: I have personally reviewed pertinent reports  VTE Prophylaxis: Heparin    Code Status: Level 3 - DNAR and DNI    Counseling / Coordination of Care  Total time spent today 55 minutes  Greater than 50% of total time was spent with the patient and / or family counseling and / or coordination of care   A description of the counseling / coordination of care: Review images, discuss possible diagnosis, future plan and follow-up

## 2022-03-19 NOTE — ASSESSMENT & PLAN NOTE
· MRI shows multiple small acute infarcts less than 1 cm in size  · Patient with significant right-sided weakness upper and lower extremity  · Patient with history of AFib, has refused anticoagulation in the past   Patient is on aspirin at home  · Spoke with patient's daughter at bedside at this time given patient's current situation and frailty will hold off on full-dose anticoagulation  · Daughter is opting for hospice care, will notify case management  · Continue statin and aspirin  · Patient with difficulty swallowing, cleared for mechanical solid foods with honey thick liquids

## 2022-03-19 NOTE — CASE MANAGEMENT
Case Management Discharge Planning Note    Patient name Autumn Olivia  Location /-76 MRN [id number]  : 3/29/1921 Date 3/19/2022       Current Admission Date: 3/17/2022  Current Admission Diagnosis:Acute embolic stroke [facility])   Patient Active Problem List    Diagnosis Date Noted    ICAO (internal carotid artery occlusion), left     Acute embolic stroke ([facility] [address] )     GERD (gastroesophageal reflux disease) 2022    A-fib ([facility] [address] ) 2022    Hypokalemia 2021    Neuropathy of ankle, left 2021    Persistent proteinuria 2021    Primary osteoarthritis of both knees 2020    Primary osteoarthritis of left knee 2020    Eustachian tube dysfunction, right 2020    Hypertension 2020    Arthritis 2020    Pure hypercholesterolemia 2019    Sensorineural hearing loss (SNHL) of both ears 2019      LOS (days): 2  Geometric Mean LOS (GMLOS) (days): 2 90  Days to GMLOS:1 1     OBJECTIVE:  Risk of Unplanned Readmission Score: 11         Current admission status: Inpatient   Preferred Pharmacy:   [facility] [address]  [address]  [address] [address]  Phone: [phone] Fax: [phone]    Primary Care Provider: Bridgette Boone MD    Primary Insurance: MEDICARE  Secondary Insurance: [facility]    DISCHARGE DETAILS:    Discharge planning discussed with[de-identified] patient and daughter  Freedom of Choice: Yes  Comments - Freedom of Choice: rehab was recommended, pt and daughter declined, daughter would like c  she did given cm two choices of hhc  CM contacted family/caregiver?: Yes             Contacts  Patient Contacts: Norman Rosas  Relationship to Patient[de-identified] Family (daughter)  Contact Method:  In Person  Reason/Outcome: Discharge [address]         Is the patient interested in [facility] at discharge?: Yes         Other Referral/Resources/Interventions Provided:  Interventions: HHC  Referral Comments: referrals sent for hhc as requested  [facility]

## 2022-03-20 NOTE — ACP (ADVANCE CARE PLANNING)
Serious Illness Conversation    1  What is your understanding now of where you are with your illness? Prognostic Understanding: no understanding of prognosis  Advanced care planning discussed with patient's daughter at bedside     2  How much information about what is likely to be ahead with your illness would you like to have? Patient would like to be fully informed if she is able to comprehend     3  What did you (clinician) communicate to the patient? Explained to daughter that given her significant stroke with significant debility (right-sided weakness, dysarthria, and dysphagia) and old age/frailty prognosis is poor at this time  Daughter would like a hospice evaluation     4  If your health situation worsens, what are your most important goals? Goals: not be a burden     5  What are the biggest fears and worries about the future and your health? Fears/Worries: pain, burdening others     6  What abilities are so critical to your life that you cannot imagine living without them? Unacceptable Function: not being able to care for myself, including toileting and feeding     7  What gives you strength as you think about the future with your illness? 8  If you become sicker, how much are you willing to go through for the possibility of gaining more time? 9  How much does your proxy and family know about your priorities and wishes? Discussion Discussion: extensive discussion with family about goals and wishes        How does this plan sound to you? I will do everything I can to help you through this       I have spent 30 minutes speaking with my patient on advanced care planning today or during this visit     Advanced directives  Five Wishes: Patient does not have Five Wishes- would not like information

## 2022-03-20 NOTE — PROGRESS NOTES
[address]  Progress Note - Rafael Albert 3/29/1921, 80 y o  female MRN: [id number]  Unit/Bed#: -01 Encounter: [id number]  Primary Care Provider: Laverna Kehr, MD   Date and time admitted to hospital: 3/17/2022  7:19 PM    * Acute embolic stroke [facility])  Assessment & Plan  · MRI shows multiple small acute infarcts less than 1 cm in size  · Patient with significant right-sided weakness upper and lower extremity  · Patient with history of AFib, has refused anticoagulation in the past   Patient is on aspirin at home  · Spoke with patient's daughter at bedside at this time given patient's current situation and frailty will hold off on full-dose anticoagulation  · Daughter is opting for hospice care, will notify case management  · Continue statin and aspirin  · Patient with difficulty swallowing, cleared for mechanical solid foods with honey thick liquids    ICAO (internal carotid artery occlusion), left  Assessment & Plan  · CTA shows occluded left cervical ICA and intracranial ICA  · Neuro discussed with endovascular team and patient is not a candidate for any intervention  · Will continue aspirin  · Hospice evaluation    A-fib [facility])  Assessment & Plan  Will continue aspirin for now  Patient has refused full-dose anticoagulation in the past   After speaking to daughter at bedside will continue with aspirin  Hospice evaluation pending  · Home verapamil dose resumed for rate control    GERD (gastroesophageal reflux disease)  Assessment & Plan  PPI    Hypertension  Assessment & Plan  · Blood pressure has been elevated, patient was off medications to allow for permissive hypertension  · Verapamil resumed  · BP improving    Pure hypercholesterolemia  Assessment & Plan  · Continue statin      VTE Prophylaxis:  Heparin    Patient Centered Rounds: I have performed bedside rounds with nursing staff today      Discussions with Specialists or Other Care Team Provider: yes  Education and Discussions with Family / Patient:  Spoke with patient's daughter at bedside regarding plan of care    Current Length of Stay: 3 day(s)    Current Patient Status: Inpatient   Certification Statement: The patient will continue to require additional inpatient hospital stay due to Stroke    Discharge Plan:  Hospice evaluation and then discharge planning    Code Status: Level 3 - DNAR and DNI    Subjective:   No overnight events noted  Patient still with right-sided weakness  Unable to get review of systems from patient due to debility from stroke    Objective:     Vitals:   Temp (24hrs), Av °F (36 7 °C), Min:96 7 °F (35 9 °C), Max:98 9 °F (37 2 °C)    Temp:  [96 7 °F (35 9 °C)-98 9 °F (37 2 °C)] 97 2 °F (36 2 °C)  HR:  [] 80  Resp:  [16-22] 20  BP: (125-174)/() 157/93  SpO2:  [93 %-97 %] 97 %  Body mass index is 28 42 kg/m²  Input and Output Summary (last 24 hours): Intake/Output Summary (Last 24 hours) at 3/20/2022 1527  Last data filed at 3/20/2022 0700  Gross per 24 hour   Intake 70 ml   Output 250 ml   Net -180 ml       Physical Exam:   Physical Exam  Constitutional:       Comments: Frail elderly female  Drowsy but arousable   HENT:      Head: Normocephalic and atraumatic  Nose: Nose normal       Mouth/Throat:      Mouth: Mucous membranes are dry  Eyes:      Conjunctiva/sclera: Conjunctivae normal    Cardiovascular:      Rate and Rhythm: Normal rate and regular rhythm  Pulmonary:      Effort: Pulmonary effort is normal  No respiratory distress  Abdominal:      Palpations: Abdomen is soft  Tenderness: There is no abdominal tenderness  Musculoskeletal:      Cervical back: Neck supple  Comments: Right-sided weakness   Skin:     General: Skin is warm and dry  Neurological:      Comments: Patient with significant right-sided weakness    Unable to follow simple commands   Psychiatric:         Behavior: Behavior normal          Additional Data:     Labs:    Results from last 7 days   Lab Units 03/19/22  0453   WBC Thousand/uL 9 93   HEMOGLOBIN g/dL 12 9   HEMATOCRIT % 37 0   PLATELETS Thousands/uL 225     Results from last 7 days   Lab Units 03/19/22  0453   SODIUM mmol/L 134*   POTASSIUM mmol/L 3 5   CHLORIDE mmol/L 99   CO2 mmol/L 29   BUN mg/dL 15   CREATININE mg/dL 0 64   CALCIUM mg/dL 8 4     Results from last 7 days   Lab Units 03/17/22  1935   INR  1 09     Results from last 7 days   Lab Units 03/17/22  1932   POC GLUCOSE mg/dl 141*     Results from last 7 days   Lab Units 03/18/22  0612   HEMOGLOBIN A1C % 5 5       * I Have Reviewed All Lab Data Listed Above  * Additional Pertinent Lab Tests Reviewed: [address] Admission  Reviewed    Imaging:  Imaging Reports Reviewed Today Include:  No new imaging    Recent Cultures (last 7 days):           Last 24 Hours Medication List:   Current Facility-Administered Medications   Medication Dose Route Frequency Provider Last Rate    acetaminophen  650 mg Oral Q6H PRN Leanord Sitter, CRNP      aspirin  81 mg Oral Daily Leanord Sitter, CRNP      atorvastatin  40 mg Oral QPM Leanord Sitter, CRNP      heparin (porcine)  5,000 Units Subcutaneous [facility] Leanord Sitter, CRNP      ondansetron  4 mg Intravenous Q6H PRN Leanord Sitter, CRNP      pantoprazole  40 mg Oral Early Morning Leanord Sitter, CRNP      polyethylene glycol  17 g Oral Daily Leanord Sitter, CRNP      verapamil  180 mg Oral Daily Jonah Stone MD          Today, Patient Was Seen By: Jonah Stone MD    ** Please Note: Dictation voice to text software may have been used in the creation of this document   **

## 2022-03-20 NOTE — PLAN OF CARE
Problem: PAIN - ADULT  Goal: Verbalizes/displays adequate comfort level or baseline comfort level  Description: Interventions:  - Encourage patient to monitor pain and request assistance  - Assess pain using appropriate pain scale  - Administer analgesics based on type and severity of pain and evaluate response  - Implement non-pharmacological measures as appropriate and evaluate response  - Consider cultural and social influences on pain and pain management  - Notify physician/advanced practitioner if interventions unsuccessful or patient reports new pain  Outcome: Progressing     Problem: INFECTION - ADULT  Goal: Absence or prevention of progression during hospitalization  Description: INTERVENTIONS:  - Assess and monitor for signs and symptoms of infection  - Monitor lab/diagnostic results  - Monitor all insertion sites, i e  indwelling lines, tubes, and drains  - Monitor endotracheal if appropriate and nasal secretions for changes in amount and color  - [place] appropriate cooling/warming therapies per order  - Administer medications as ordered  - Instruct and encourage patient and family to use good hand hygiene technique  - Identify and instruct in appropriate isolation precautions for identified infection/condition  Outcome: Progressing     Problem: SAFETY ADULT  Goal: Patient will remain free of falls  Description: INTERVENTIONS:  - Educate patient/family on patient safety including physical limitations  - Instruct patient to call for assistance with activity   - Consult OT/PT to assist with strengthening/mobility   - Keep Call bell within reach  - Keep bed low and locked with side rails adjusted as appropriate  - Keep care items and personal belongings within reach  - Initiate and maintain comfort rounds  - Make Fall Risk Sign visible to staff  - Offer Toileting every 2 Hours, in advance of need  - Initiate/Maintain fall alarm  - Obtain necessary fall risk management equipment: fall alarm  - Apply yellow socks and bracelet for high fall risk patients  - Consider moving patient to room near nurses station  Outcome: Progressing  Goal: Maintain or return to baseline ADL function  Description: INTERVENTIONS:  -  Assess patient's ability to carry out ADLs; assess patient's baseline for ADL function and identify physical deficits which impact ability to perform ADLs (bathing, care of mouth/teeth, toileting, grooming, dressing, etc )  - Assess/evaluate cause of self-care deficits   - Assess range of motion  - Assess patient's mobility; develop plan if impaired  - Assess patient's need for assistive devices and provide as appropriate  - Encourage maximum independence but intervene and supervise when necessary  - Involve family in performance of ADLs  - Assess for home care needs following discharge   - Consider OT consult to assist with ADL evaluation and planning for discharge  - Provide patient education as appropriate  Outcome: Progressing  Goal: Maintains/Returns to pre admission functional level  Description: INTERVENTIONS:  - Perform BMAT or MOVE assessment daily    - Set and communicate daily mobility goal to care team and patient/family/caregiver  - Collaborate with rehabilitation services on mobility goals if consulted  - Perform Range of Motion 3 times a day  - Reposition patient every 2 hours    - Dangle patient 3 times a day  - Stand patient 3 times a day  - Ambulate patient 3 times a day  - Out of bed to chair 3 times a day   - Out of bed for meals 3 times a day  - Out of bed for toileting  - Record patient progress and toleration of activity level   Outcome: Progressing     Problem: DISCHARGE PLANNING  Goal: Discharge to home or other facility with appropriate resources  Description: INTERVENTIONS:  - Identify barriers to discharge w/patient and caregiver  - Arrange for needed discharge resources and transportation as appropriate  - Identify discharge learning needs (meds, wound care, etc )  - Arrange for interpretive services to assist at discharge as needed  - Refer to Case Management Department for coordinating discharge planning if the patient needs post-hospital services based on physician/advanced practitioner order or complex needs related to functional status, cognitive ability, or social support system  Outcome: Progressing     Problem: Knowledge Deficit  Goal: Patient/family/caregiver demonstrates understanding of disease process, treatment plan, medications, and discharge instructions  Description: Complete learning assessment and assess knowledge base  Interventions:  - Provide teaching at level of understanding  - Provide teaching via preferred learning methods  Outcome: Progressing     Problem: MOBILITY - ADULT  Goal: Maintain or return to baseline ADL function  Description: INTERVENTIONS:  -  Assess patient's ability to carry out ADLs; assess patient's baseline for ADL function and identify physical deficits which impact ability to perform ADLs (bathing, care of mouth/teeth, toileting, grooming, dressing, etc )  - Assess/evaluate cause of self-care deficits   - Assess range of motion  - Assess patient's mobility; develop plan if impaired  - Assess patient's need for assistive devices and provide as appropriate  - Encourage maximum independence but intervene and supervise when necessary  - Involve family in performance of ADLs  - Assess for home care needs following discharge   - Consider OT consult to assist with ADL evaluation and planning for discharge  - Provide patient education as appropriate  Outcome: Progressing  Goal: Maintains/Returns to pre admission functional level  Description: INTERVENTIONS:  - Perform BMAT or MOVE assessment daily    - Set and communicate daily mobility goal to care team and patient/family/caregiver  - Collaborate with rehabilitation services on mobility goals if consulted  - Perform Range of Motion 3 times a day  - Reposition patient every 2 hours    - Dangle patient 3 times a day  - Stand patient 3 times a day  - Ambulate patient 3 times a day  - Out of bed to chair 3 times a day   - Out of bed for meals 3 times a day  - Out of bed for toileting  - Record patient progress and toleration of activity level   Outcome: Progressing     Problem: Prexisting or High Potential for Compromised Skin Integrity  Goal: Skin integrity is maintained or improved  Description: INTERVENTIONS:  - Identify patients at risk for skin breakdown  - Assess and monitor skin integrity  - Assess and monitor nutrition and hydration status  - Monitor labs   - Assess for incontinence   - Turn and reposition patient  - Assist with mobility/ambulation  - Relieve pressure over bony prominences  - Avoid friction and shearing  - Provide appropriate hygiene as needed including keeping skin clean and dry  - Evaluate need for skin moisturizer/barrier cream  - Collaborate with interdisciplinary team   - Patient/family teaching  - Consider wound care consult   Outcome: Progressing

## 2022-03-20 NOTE — PLAN OF CARE
Problem: INFECTION - ADULT  Goal: Absence or prevention of progression during hospitalization  Description: INTERVENTIONS:  - Assess and monitor for signs and symptoms of infection  - Monitor lab/diagnostic results  - Monitor all insertion sites, i e  indwelling lines, tubes, and drains  - Monitor endotracheal if appropriate and nasal secretions for changes in amount and color  - [place] appropriate cooling/warming therapies per order  - Administer medications as ordered  - Instruct and encourage patient and family to use good hand hygiene technique  - Identify and instruct in appropriate isolation precautions for identified infection/condition  Outcome: Progressing     Problem: PAIN - ADULT  Goal: Verbalizes/displays adequate comfort level or baseline comfort level  Description: Interventions:  - Encourage patient to monitor pain and request assistance  - Assess pain using appropriate pain scale  - Administer analgesics based on type and severity of pain and evaluate response  - Implement non-pharmacological measures as appropriate and evaluate response  - Consider cultural and social influences on pain and pain management  - Notify physician/advanced practitioner if interventions unsuccessful or patient reports new pain  Outcome: Progressing     Problem: Prexisting or High Potential for Compromised Skin Integrity  Goal: Skin integrity is maintained or improved  Description: INTERVENTIONS:  - Identify patients at risk for skin breakdown  - Assess and monitor skin integrity  - Assess and monitor nutrition and hydration status  - Monitor labs   - Assess for incontinence   - Turn and reposition patient  - Assist with mobility/ambulation  - Relieve pressure over bony prominences  - Avoid friction and shearing  - Provide appropriate hygiene as needed including keeping skin clean and dry  - Evaluate need for skin moisturizer/barrier cream  - Collaborate with interdisciplinary team   - Patient/family teaching  - Consider wound care consult   Outcome: Progressing

## 2022-03-20 NOTE — ASSESSMENT & PLAN NOTE
· Blood pressure has been elevated, patient was off medications to allow for permissive hypertension  · Verapamil resumed  · BP improving

## 2022-03-21 NOTE — SPEECH THERAPY NOTE
Pt received reclined in bed with former  present visiting patient  Patient reporting that she's doing well "I'm a good eater "  ST opportunities to upgrade her diet as pt transitions toward discharge we will determine pleasure feed needs at that time

## 2022-03-21 NOTE — ASSESSMENT & PLAN NOTE
· Blood pressure has been elevated, patient was off medications to allow for permissive hypertension  · Verapamil resumed  · BP initially improved; however, now with intermittent episodes of hypertension    · prn hydralazine for SBP greater than 160

## 2022-03-21 NOTE — HOSPICE NOTE
Received referral  Patient was approved for Hospice at home or nursing facility  I called Geo Caldera to review  She requested I meet her tomorrow morning at 11am to discuss  TOMÁS Pettit updated

## 2022-03-21 NOTE — PROGRESS NOTES
[address]  Progress Note - Autumn Olivia 3/29/1921, 80 y o  female MRN: [id number]  Unit/Bed#: -01 Encounter: [id number]  Primary Care Provider: Bridgette Boone MD   Date and time admitted to hospital: 3/17/2022  7:19 PM    * Acute embolic stroke [facility])  Assessment & Plan  · MRI shows multiple small acute infarcts less than 1 cm in size  · Patient with significant right-sided weakness upper and lower extremity  · Patient with history of AFib, has refused anticoagulation in the past   Patient is on aspirin at home  · Spoke with patient's daughter at bedside at this time given patient's current situation and frailty will hold off on full-dose anticoagulation  · Daughter is opting for hospice care  Cm notified  · Qualifies for home hospice  Meeting with hospice nurse tomorrow morning at 11:00 a m  To discuss further  · Continue statin and aspirin  · Patient with difficulty swallowing, cleared for mechanical solid foods with honey thick liquids    ICAO (internal carotid artery occlusion), left  Assessment & Plan  · CTA shows occluded left cervical ICA and intracranial ICA  · Neuro discussed with endovascular team and patient is not a candidate for any intervention  · Will continue aspirin  · Hospice evaluation    A-fib [facility])  Assessment & Plan  · Will continue aspirin for now  Patient has refused full-dose anticoagulation in the past   Previous provider had spoken to daughter at the bedside, and agree to continue aspirin  · Patient's daughter requesting hospice evaluation  Patient was approved for home hospice     · Home verapamil dose resumed for rate control  · Prn IV labetalol for heart rate greater than 120    GERD (gastroesophageal reflux disease)  Assessment & Plan  · PPI    Hypertension  Assessment & Plan  · Blood pressure has been elevated, patient was off medications to allow for permissive hypertension  · Verapamil resumed  · BP initially improved; however, now with intermittent episodes of hypertension  · prn hydralazine for SBP greater than 160    Pure hypercholesterolemia  Assessment & Plan  · Continue statin        VTE Pharmacologic Prophylaxis: VTE Score: 9 Aspirin    Patient Centered Rounds: I performed bedside rounds with nursing staff today  Discussions with Specialists or Other Care Team Provider:  Case Management, nursing    Education and Discussions with Family / Patient: Updated  (daughter) at bedside  Time Spent for Care: 30 minutes  More than 50% of total time spent on counseling and coordination of care as described above  Current Length of Stay: 4 day(s)  Current Patient Status: Inpatient   Certification Statement: The patient will continue to require additional inpatient hospital stay due to Hospice evaluation and disposition planning  Discharge Plan: Anticipate discharge tomorrow to home  Code Status: Level 3 - DNAR and DNI    Subjective:   Patient seen and examined resting in bed, daughter and patient's  present at the time of the encounter  Patient is unfortunately nonverbal and makes only incomprehensible sounds when directed  Patient's daughter states that she spoke with hospice nurse today, and would like to speak to her again tomorrow in person to discuss home hospice further  Objective:     Vitals:   Temp (24hrs), Av 4 °F (36 3 °C), Min:97 °F (36 1 °C), Max:97 7 °F (36 5 °C)    Temp:  [97 °F (36 1 °C)-97 7 °F (36 5 °C)] 97 °F (36 1 °C)  HR:  [107-118] 109  Resp:  [18-22] 22  BP: (156-197)/() 156/96  SpO2:  [91 %-95 %] 91 %  Body mass index is 28 67 kg/m²  Input and Output Summary (last 24 hours): Intake/Output Summary (Last 24 hours) at 3/21/2022 1553  Last data filed at 3/21/2022 9141  Gross per 24 hour   Intake 120 ml   Output 500 ml   Net -380 ml       Physical Exam:   Physical Exam  Vitals and nursing note reviewed  Constitutional:       General: She is not in acute distress       Appearance: She is normal weight  She is ill-appearing  She is not toxic-appearing  HENT:      Head: Normocephalic and atraumatic  Mouth/Throat:      Mouth: Mucous membranes are moist    Eyes:      Conjunctiva/sclera: Conjunctivae normal    Cardiovascular:      Rate and Rhythm: Tachycardia present  Rhythm irregular  Pulses: Normal pulses  Heart sounds: Normal heart sounds  Pulmonary:      Effort: Pulmonary effort is normal  No respiratory distress  Breath sounds: Normal breath sounds  No wheezing, rhonchi or rales  Abdominal:      General: Bowel sounds are normal  There is no distension  Palpations: Abdomen is soft  Tenderness: There is no abdominal tenderness  Musculoskeletal:      Cervical back: Neck supple  Right lower leg: No edema  Left lower leg: No edema  Skin:     General: Skin is warm and dry  Neurological:      Mental Status: She is easily aroused  She is disoriented  Cranial Nerves: Dysarthria present  Motor: Weakness present  Psychiatric:         Behavior: Behavior is cooperative  Additional Data:     Labs:  Results from last 7 days   Lab Units 03/19/22  0453   WBC Thousand/uL 9 93   HEMOGLOBIN g/dL 12 9   HEMATOCRIT % 37 0   PLATELETS Thousands/uL 225     Results from last 7 days   Lab Units 03/19/22  0453   SODIUM mmol/L 134*   POTASSIUM mmol/L 3 5   CHLORIDE mmol/L 99   CO2 mmol/L 29   BUN mg/dL 15   CREATININE mg/dL 0 64   ANION GAP mmol/L 6   CALCIUM mg/dL 8 4   GLUCOSE RANDOM mg/dL 102     Results from last 7 days   Lab Units 03/17/22  1935   INR  1 09     Results from last 7 days   Lab Units 03/17/22 1932   POC GLUCOSE mg/dl 141*     Results from last 7 days   Lab Units 03/18/22  0612   HEMOGLOBIN A1C % 5 5           Lines/Drains:  Invasive Devices  Report    Peripheral Intravenous Line            Peripheral IV 03/20/22 Dorsal (posterior); Left Wrist <1 day          Drain            External Urinary Catheter 3 days Imaging: Reviewed radiology reports from this admission including: CT head, CTA head and neck, CT chest/abdomen/pelvis, MRI brain, CT head    Recent Cultures (last 7 days):         Last 24 Hours Medication List:   Current Facility-Administered Medications   Medication Dose Route Frequency Provider Last Rate    acetaminophen  650 mg Oral Q6H PRN BING Bowman      aspirin  81 mg Oral Daily BING Bowman      atorvastatin  40 mg Oral QPM BING Bowman      carvedilol  3 125 mg Oral BID With Meals BING Chakraborty      heparin (porcine)  5,000 Units Subcutaneous [facility] BING Bowman      hydrALAZINE  10 mg Intravenous Q6H PRN Kayy Nava PA-C      Labetalol HCl  10 mg Intravenous 4x Daily PRN Juventino Marques PA-C      ondansetron  4 mg Intravenous Q6H PRN BING Bowman      pantoprazole  40 mg Oral Early Morning BING Bowman      polyethylene glycol  17 g Oral Daily BING Bowman      verapamil  180 mg Oral Daily Mk Still MD          Today, Patient Was Seen By: Juventino Marques PA-C    **Please Note: This note may have been constructed using a voice recognition system  **

## 2022-03-21 NOTE — PLAN OF CARE
Problem: PAIN - ADULT  Goal: Verbalizes/displays adequate comfort level or baseline comfort level  Description: Interventions:  - Encourage patient to monitor pain and request assistance  - Assess pain using appropriate pain scale  - Administer analgesics based on type and severity of pain and evaluate response  - Implement non-pharmacological measures as appropriate and evaluate response  - Consider cultural and social influences on pain and pain management  - Notify physician/advanced practitioner if interventions unsuccessful or patient reports new pain  Outcome: Progressing     Problem: INFECTION - ADULT  Goal: Absence or prevention of progression during hospitalization  Description: INTERVENTIONS:  - Assess and monitor for signs and symptoms of infection  - Monitor lab/diagnostic results  - Monitor all insertion sites, i e  indwelling lines, tubes, and drains  - Monitor endotracheal if appropriate and nasal secretions for changes in amount and color  - [place] appropriate cooling/warming therapies per order  - Administer medications as ordered  - Instruct and encourage patient and family to use good hand hygiene technique  - Identify and instruct in appropriate isolation precautions for identified infection/condition  Outcome: Progressing     Problem: SAFETY ADULT  Goal: Patient will remain free of falls  Description: INTERVENTIONS:  - Educate patient/family on patient safety including physical limitations  - Instruct patient to call for assistance with activity   - Consult OT/PT to assist with strengthening/mobility   - Keep Call bell within reach  - Keep bed low and locked with side rails adjusted as appropriate  - Keep care items and personal belongings within reach  - Initiate and maintain comfort rounds  - Make Fall Risk Sign visible to staff  - Offer Toileting every 2 Hours, in advance of need  - Initiate/Maintain fall alarm  - Obtain necessary fall risk management equipment: fall alarm  - Apply yellow socks and bracelet for high fall risk patients  - Consider moving patient to room near nurses station  Outcome: Progressing  Goal: Maintain or return to baseline ADL function  Description: INTERVENTIONS:  -  Assess patient's ability to carry out ADLs; assess patient's baseline for ADL function and identify physical deficits which impact ability to perform ADLs (bathing, care of mouth/teeth, toileting, grooming, dressing, etc )  - Assess/evaluate cause of self-care deficits   - Assess range of motion  - Assess patient's mobility; develop plan if impaired  - Assess patient's need for assistive devices and provide as appropriate  - Encourage maximum independence but intervene and supervise when necessary  - Involve family in performance of ADLs  - Assess for home care needs following discharge   - Consider OT consult to assist with ADL evaluation and planning for discharge  - Provide patient education as appropriate  Outcome: Progressing  Goal: Maintains/Returns to pre admission functional level  Description: INTERVENTIONS:  - Perform BMAT or MOVE assessment daily    - Set and communicate daily mobility goal to care team and patient/family/caregiver  - Collaborate with rehabilitation services on mobility goals if consulted  - Perform Range of Motion 3 times a day  - Reposition patient every 2 hours    - Dangle patient 3 times a day  - Stand patient 3 times a day  - Ambulate patient 3 times a day  - Out of bed to chair 3 times a day   - Out of bed for meals 3 times a day  - Out of bed for toileting  - Record patient progress and toleration of activity level   Outcome: Progressing     Problem: DISCHARGE PLANNING  Goal: Discharge to home or other facility with appropriate resources  Description: INTERVENTIONS:  - Identify barriers to discharge w/patient and caregiver  - Arrange for needed discharge resources and transportation as appropriate  - Identify discharge learning needs (meds, wound care, etc )  - Arrange for interpretive services to assist at discharge as needed  - Refer to Case Management Department for coordinating discharge planning if the patient needs post-hospital services based on physician/advanced practitioner order or complex needs related to functional status, cognitive ability, or social support system  Outcome: Progressing     Problem: Knowledge Deficit  Goal: Patient/family/caregiver demonstrates understanding of disease process, treatment plan, medications, and discharge instructions  Description: Complete learning assessment and assess knowledge base  Interventions:  - Provide teaching at level of understanding  - Provide teaching via preferred learning methods  Outcome: Progressing     Problem: MOBILITY - ADULT  Goal: Maintain or return to baseline ADL function  Description: INTERVENTIONS:  -  Assess patient's ability to carry out ADLs; assess patient's baseline for ADL function and identify physical deficits which impact ability to perform ADLs (bathing, care of mouth/teeth, toileting, grooming, dressing, etc )  - Assess/evaluate cause of self-care deficits   - Assess range of motion  - Assess patient's mobility; develop plan if impaired  - Assess patient's need for assistive devices and provide as appropriate  - Encourage maximum independence but intervene and supervise when necessary  - Involve family in performance of ADLs  - Assess for home care needs following discharge   - Consider OT consult to assist with ADL evaluation and planning for discharge  - Provide patient education as appropriate  Outcome: Progressing  Goal: Maintains/Returns to pre admission functional level  Description: INTERVENTIONS:  - Perform BMAT or MOVE assessment daily    - Set and communicate daily mobility goal to care team and patient/family/caregiver  - Collaborate with rehabilitation services on mobility goals if consulted  - Perform Range of Motion 3 times a day  - Reposition patient every 2 hours    - Dangle patient 3 times a day  - Stand patient 3 times a day  - Ambulate patient 3 times a day  - Out of bed to chair 3 times a day   - Out of bed for meals 3 times a day  - Out of bed for toileting  - Record patient progress and toleration of activity level   Outcome: Progressing     Problem: Prexisting or High Potential for Compromised Skin Integrity  Goal: Skin integrity is maintained or improved  Description: INTERVENTIONS:  - Identify patients at risk for skin breakdown  - Assess and monitor skin integrity  - Assess and monitor nutrition and hydration status  - Monitor labs   - Assess for incontinence   - Turn and reposition patient  - Assist with mobility/ambulation  - Relieve pressure over bony prominences  - Avoid friction and shearing  - Provide appropriate hygiene as needed including keeping skin clean and dry  - Evaluate need for skin moisturizer/barrier cream  - Collaborate with interdisciplinary team   - Patient/family teaching  - Consider wound care consult   Outcome: Progressing

## 2022-03-21 NOTE — ASSESSMENT & PLAN NOTE
· Will continue aspirin for now  Patient has refused full-dose anticoagulation in the past   Previous provider had spoken to daughter at the bedside, and agree to continue aspirin  · Patient's daughter requesting hospice evaluation  Patient was approved for home hospice     · Home verapamil dose resumed for rate control  · Prn IV labetalol for heart rate greater than 120

## 2022-03-21 NOTE — ASSESSMENT & PLAN NOTE
· MRI shows multiple small acute infarcts less than 1 cm in size  · Patient with significant right-sided weakness upper and lower extremity  · Patient with history of AFib, has refused anticoagulation in the past   Patient is on aspirin at home  · Spoke with patient's daughter at bedside at this time given patient's current situation and frailty will hold off on full-dose anticoagulation  · Daughter is opting for hospice care  Cm notified  · Qualifies for home hospice  Meeting with hospice nurse tomorrow morning at 11:00 a m  To discuss further    · Continue statin and aspirin  · Patient with difficulty swallowing, cleared for mechanical solid foods with honey thick liquids

## 2022-03-21 NOTE — CASE MANAGEMENT
Case Management Discharge Planning Note    Patient name Dhruv Gallegos  Location /-64 MRN [id number]  : 3/29/1921 Date 3/21/2022       Current Admission Date: 3/17/2022  Current Admission Diagnosis:Acute embolic stroke [facility])   Patient Active Problem List    Diagnosis Date Noted    ICAO (internal carotid artery occlusion), left     Acute embolic stroke ([facility] [address] )     GERD (gastroesophageal reflux disease) 2022    A-fib ([facility] [address] ) 2022    Hypokalemia 2021    Neuropathy of ankle, left 2021    Persistent proteinuria 2021    Primary osteoarthritis of both knees 2020    Primary osteoarthritis of left knee 2020    Eustachian tube dysfunction, right 2020    Hypertension 2020    Arthritis 2020    Pure hypercholesterolemia 2019    Sensorineural hearing loss (SNHL) of both ears 2019      LOS (days): 4  Geometric Mean LOS (GMLOS) (days): 2 90  Days to GMLOS:-0 7     OBJECTIVE:  Risk of Unplanned Readmission Score: 11     Current admission status: Inpatient   Preferred Pharmacy:   [facility] [address]  Phone: [phone] Fax: [phone]    Primary Care Provider: Raquel Dodd MD    Primary Insurance: MEDICARE  Secondary Insurance: [address]  DETAILS:  Received a hospice order  Spoke to pt daughter Danish Fitch who is in agreement with same  Reviewed the list os hospice agencies over the phone with the daughter  Daughter chose [facility]  Sent a referral for same

## 2022-03-22 PROBLEM — Z71.89 GOALS OF CARE, COUNSELING/DISCUSSION: Status: ACTIVE | Noted: 2022-01-01

## 2022-03-22 NOTE — SPEECH THERAPY NOTE
Speaking with family today regarding transitional care from a swallowing standpoint and pleasure feeds  Answered all of her daughter Shruthi's questions  Encouraged follow up questions should they arise  Patient is appropriate for pleasure feed diet with no swallowing restrictions within reason  Please adhere to all other safe swallow practices  No further ST is warranted at this time

## 2022-03-22 NOTE — ASSESSMENT & PLAN NOTE
· Will continue aspirin for now  Patient has refused full-dose anticoagulation in the past   Previous provider had spoken to daughter at the bedside, and agree to continue aspirin  · Hospice evaluation requested by daughter  Patient was approved for hospice  · Goals of care discussion had with patient's daughter 03/22/2022, and agreeable to CMO

## 2022-03-22 NOTE — PLAN OF CARE
Patient is AxO to self only; disoriented to place, time, & situation  Patient is lethargic/sleepy but is easily arousable  Patient has expressive asphasia & has difficulty communicating  Patient is on RA & VSS  Patient denies pain/discomfort via FLACC scale  Bed is in lowest position  Patient is an assist x2 for care  Q 2 turning & repositioning with wedges provided  Felicitas Acosta is patent  Bed is in lowest position  Alarms audible  All needs are within reach    Problem: INFECTION - ADULT  Goal: Absence or prevention of progression during hospitalization  Description: INTERVENTIONS:  - Assess and monitor for signs and symptoms of infection  - Monitor lab/diagnostic results  - Monitor all insertion sites, i e  indwelling lines, tubes, and drains  - Monitor endotracheal if appropriate and nasal secretions for changes in amount and color  - [place] appropriate cooling/warming therapies per order  - Administer medications as ordered  - Instruct and encourage patient and family to use good hand hygiene technique  - Identify and instruct in appropriate isolation precautions for identified infection/condition  Outcome: Progressing     Problem: SAFETY ADULT  Goal: Patient will remain free of falls  Description: INTERVENTIONS:  - Educate patient/family on patient safety including physical limitations  - Instruct patient to call for assistance with activity   - Consult OT/PT to assist with strengthening/mobility   - Keep Call bell within reach  - Keep bed low and locked with side rails adjusted as appropriate  - Keep care items and personal belongings within reach  - Initiate and maintain comfort rounds  - Make Fall Risk Sign visible to staff  - Offer Toileting every 2 Hours, in advance of need  - Initiate/Maintain fall alarm  - Obtain necessary fall risk management equipment: fall alarm  - Apply yellow socks and bracelet for high fall risk patients  - Consider moving patient to room near nurses station  Outcome: Progressing  Goal: Maintain or return to baseline ADL function  Description: INTERVENTIONS:  -  Assess patient's ability to carry out ADLs; assess patient's baseline for ADL function and identify physical deficits which impact ability to perform ADLs (bathing, care of mouth/teeth, toileting, grooming, dressing, etc )  - Assess/evaluate cause of self-care deficits   - Assess range of motion  - Assess patient's mobility; develop plan if impaired  - Assess patient's need for assistive devices and provide as appropriate  - Encourage maximum independence but intervene and supervise when necessary  - Involve family in performance of ADLs  - Assess for home care needs following discharge   - Consider OT consult to assist with ADL evaluation and planning for discharge  - Provide patient education as appropriate  Outcome: Progressing     Problem: MOBILITY - ADULT  Goal: Maintain or return to baseline ADL function  Description: INTERVENTIONS:  -  Assess patient's ability to carry out ADLs; assess patient's baseline for ADL function and identify physical deficits which impact ability to perform ADLs (bathing, care of mouth/teeth, toileting, grooming, dressing, etc )  - Assess/evaluate cause of self-care deficits   - Assess range of motion  - Assess patient's mobility; develop plan if impaired  - Assess patient's need for assistive devices and provide as appropriate  - Encourage maximum independence but intervene and supervise when necessary  - Involve family in performance of ADLs  - Assess for home care needs following discharge   - Consider OT consult to assist with ADL evaluation and planning for discharge  - Provide patient education as appropriate  Outcome: Progressing     Problem: Prexisting or High Potential for Compromised Skin Integrity  Goal: Skin integrity is maintained or improved  Description: INTERVENTIONS:  - Identify patients at risk for skin breakdown  - Assess and monitor skin integrity  - Assess and monitor nutrition and hydration status  - Monitor labs   - Assess for incontinence   - Turn and reposition patient  - Assist with mobility/ambulation  - Relieve pressure over bony prominences  - Avoid friction and shearing  - Provide appropriate hygiene as needed including keeping skin clean and dry  - Evaluate need for skin moisturizer/barrier cream  - Collaborate with interdisciplinary team   - Patient/family teaching  - Consider wound care consult   Outcome: Progressing

## 2022-03-22 NOTE — HOSPICE NOTE
MET WITH DGT AT [facility]  SHE WAS AGREEABLE  SHE WILL NEED PLACEMENT  YASMEEN, CM SENT REFERRALS OUT WILL CONTINUE TO FOLLOW

## 2022-03-22 NOTE — ASSESSMENT & PLAN NOTE
· Goals of care discussion had with patient's daughter on 03/22/2022  · Considering she is opting for hospice, and patient has been approved for hospice services, patient's daughter is agreeable to patient being made a comfort measures only  · CM for bed placement  · Patient is now level 4 - CMO

## 2022-03-22 NOTE — PLAN OF CARE
Problem: SAFETY ADULT  Goal: Patient will remain free of falls  Description: INTERVENTIONS:  - Educate patient/family on patient safety including physical limitations  - Instruct patient to call for assistance with activity   - Consult OT/PT to assist with strengthening/mobility   - Keep Call bell within reach  - Keep bed low and locked with side rails adjusted as appropriate  - Keep care items and personal belongings within reach  - Initiate and maintain comfort rounds  - Make Fall Risk Sign visible to staff  - Offer Toileting every 2 Hours, in advance of need  - Initiate/Maintain fall alarm  - Obtain necessary fall risk management equipment: fall alarm  - Apply yellow socks and bracelet for high fall risk patients  - Consider moving patient to room near nurses station  Outcome: Progressing

## 2022-03-22 NOTE — ASSESSMENT & PLAN NOTE
· MRI shows multiple small acute infarcts less than 1 cm in size  Patient with significant right-sided weakness upper and lower extremity  · Patient with history of AFib, has refused anticoagulation in the past   Patient is on aspirin at home  · Patient's daughter pursuing hospice care  Met with hospice nurse 03/22/2022, and patient was approved for home hospice  Patient's daughter cannot care for her at home  Cm notified, and working on placement  · Patient's daughter agreeable to comfort measures

## 2022-03-22 NOTE — ASSESSMENT & PLAN NOTE
· CTA shows occluded left cervical ICA and intracranial ICA  · Neuro discussed with endovascular team and patient is not a candidate for any intervention  · Hospice evaluation- approved for hospice  Cm for coordinating bed placement  · Patient is CMO; therefore, no further intervention

## 2022-03-22 NOTE — CASE MANAGEMENT
Case Management Discharge Planning Note    Patient name Autumn Olivia  Location /-36 MRN [id number]  : 3/29/1921 Date 3/22/2022       Current Admission Date: 3/17/2022  Current Admission Diagnosis:Acute embolic stroke [facility])   Patient Active Problem List    Diagnosis Date Noted    ICAO (internal carotid artery occlusion), left     Acute [facility] ([facility] [address] )     GERD (gastroesophageal reflux disease) 2022    A-fib ([facility] [address] ) 2022    Hypokalemia 2021    Neuropathy of ankle, left 2021    Persistent proteinuria 2021    Primary osteoarthritis of both knees 2020    Primary osteoarthritis of left knee 2020    Eustachian tube dysfunction, right 2020    Hypertension 2020    Arthritis 2020    Pure hypercholesterolemia 2019    Sensorineural hearing loss (SNHL) of both ears 2019      LOS (days): 5  Geometric Mean LOS (GMLOS) (days): 2 90  Days to GMLOS:-1 8     OBJECTIVE:  Risk of Unplanned Readmission Score: 11     Current admission status: Inpatient   [facility]   [facility] [address]  Phone: [phone] Fax: [phone]    Primary Care Provider: Bridgette Boone MD    Primary Insurance: MEDICARE  Secondary Insurance: [address]  DETAILS:  Pt evaluated by [facility] and was accepted  Provided a list of SNF to the daughter Jenny Hennessy  Made a blanket referral to all the facilities on the list, dtr is agreement with same

## 2022-03-22 NOTE — PROGRESS NOTES
[facility] [address]  Progress Note - Demetra Montano 3/29/1921, 80 y o  female MRN: [id number]  Unit/Bed#: -01 Encounter: [id number]  Primary Care Provider: Tristan Ramirez MD   Date and time admitted to hospital: 3/17/2022  7:19 PM    Goals of care, counseling/discussion  Assessment & Plan  · Goals of care discussion had with patient's daughter on 03/22/2022  · Considering she is opting for hospice, and patient has been approved for hospice services, patient's daughter is agreeable to patient being made a comfort measures only  · CM for bed placement  · Patient is now level 4 - CMO  * Acute embolic stroke [facility])  Assessment & Plan  · MRI shows multiple small acute infarcts less than 1 cm in size  Patient with significant right-sided weakness upper and lower extremity  · Patient with history of AFib, has refused anticoagulation in the past   Patient is on aspirin at home  · Patient's daughter pursuing hospice care  Met with hospice nurse 03/22/2022, and patient was approved for home hospice  Patient's daughter cannot care for her at home  Cm notified, and working on placement  · Patient's daughter agreeable to comfort measures  ICAO (internal carotid artery occlusion), left  Assessment & Plan  · CTA shows occluded left cervical ICA and intracranial ICA  · Neuro discussed with endovascular team and patient is not a candidate for any intervention  · Hospice evaluation- approved for hospice  Cm for coordinating bed placement  · Patient is CMO; therefore, no further intervention  A-fib [facility])  Assessment & Plan  · Will continue aspirin for now  Patient has refused full-dose anticoagulation in the past   Previous provider had spoken to daughter at the bedside, and agree to continue aspirin  · Hospice evaluation requested by daughter  Patient was approved for hospice  · Goals of care discussion had with patient's daughter 03/22/2022, and agreeable to CMO      GERD (gastroesophageal reflux disease)  Assessment & Plan  · CMO    Hypertension  Assessment & Plan  · Blood pressure has been elevated, patient was off medications to allow for permissive hypertension  · Blood pressure medications will be discontinued as patient is now CMO    Pure hypercholesterolemia  Assessment & Plan  · CMO        VTE Pharmacologic Prophylaxis: VTE Score: 9 CMO    Patient Centered Rounds: I performed bedside rounds with nursing staff today  Discussions with Specialists or Other Care Team Provider:  Case Management, nursing, hospice    Education and Discussions with Family / Patient: Updated  (daughter) at bedside  Time Spent for Care: 30 minutes  More than 50% of total time spent on counseling and coordination of care as described above  Current Length of Stay: 5 day(s)  Current Patient Status: Inpatient   Certification Statement: The patient will continue to require additional inpatient hospital stay due to Bed placement with hospice services  Discharge Plan: Pending bed placement    Code Status: Level 4 - Comfort Care    Subjective:   Patient seen and examined resting in bed, in and out of sleep, although appears to be resting comfortably  Daughter present at the time of the encounter goals of care discussion had with the patient's daughter on, and she expressed agreement with the plan moving forward to make the patient comfort measures only  Objective:     Vitals:   Temp (24hrs), Av 9 °F (36 6 °C), Min:97 2 °F (36 2 °C), Max:99 °F (37 2 °C)    Temp:  [97 2 °F (36 2 °C)-99 °F (37 2 °C)] 99 °F (37 2 °C)  HR:  [] 124  Resp:  [18-20] 19  BP: (143-167)/(83-96) 155/83  SpO2:  [90 %-97 %] 95 %  Body mass index is 29 16 kg/m²  Input and Output Summary (last 24 hours):      Intake/Output Summary (Last 24 hours) at 3/22/2022 1533  Last data filed at 3/22/2022 0720  Gross per 24 hour   Intake 10 ml   Output 150 ml   Net -140 ml       Physical Exam:   Physical Exam  Vitals and nursing note reviewed  Constitutional:       General: She is sleeping  She is not in acute distress  Appearance: She is normal weight  She is not toxic-appearing  HENT:      Head: Normocephalic and atraumatic  Mouth/Throat:      Mouth: Mucous membranes are moist    Eyes:      Conjunctiva/sclera: Conjunctivae normal    Cardiovascular:      Rate and Rhythm: Regular rhythm  Tachycardia present  Pulses: Normal pulses  Heart sounds: Normal heart sounds  Pulmonary:      Effort: Pulmonary effort is normal       Breath sounds: Normal breath sounds  No wheezing  Abdominal:      General: There is no distension  Musculoskeletal:      Cervical back: Neck supple  Right lower leg: No edema  Left lower leg: No edema  Skin:     General: Skin is warm and dry  Additional Data:     Labs:  Results from last 7 days   Lab Units 03/19/22  0453   WBC Thousand/uL 9 93   HEMOGLOBIN g/dL 12 9   HEMATOCRIT % 37 0   PLATELETS Thousands/uL 225     Results from last 7 days   Lab Units 03/19/22  0453   SODIUM mmol/L 134*   POTASSIUM mmol/L 3 5   CHLORIDE mmol/L 99   CO2 mmol/L 29   BUN mg/dL 15   CREATININE mg/dL 0 64   ANION GAP mmol/L 6   CALCIUM mg/dL 8 4   GLUCOSE RANDOM mg/dL 102     Results from last 7 days   Lab Units 03/17/22  1935   INR  1 09     Results from last 7 days   Lab Units 03/17/22  1932   POC GLUCOSE mg/dl 141*     Results from last 7 days   Lab Units 03/18/22  0612   HEMOGLOBIN A1C % 5 5           Lines/Drains:  Invasive Devices  Report    Peripheral Intravenous Line            Peripheral IV 03/20/22 Dorsal (posterior); Left Wrist 1 day          Drain            External Urinary Catheter 4 days                      Imaging: No pertinent imaging reviewed      Recent Cultures (last 7 days):         Last 24 Hours Medication List:   Current Facility-Administered Medications   Medication Dose Route Frequency Provider Last Rate    acetaminophen  650 mg Oral Q6H PRN BING Warner      aspirin  81 mg Oral Daily Johanna Jerzy, [address]      atorvastatin  40 mg Oral QPM BING Warner      carvedilol  3 125 mg Oral BID With Meals ZaireBING Brandon      heparin (porcine)  5,000 Units Subcutaneous [facility] Johanna Jerzy, [address]      hydrALAZINE  10 mg Intravenous Q6H PRN Gabi Henry PA-C      Labetalol HCl  10 mg Intravenous 4x Daily PRN Cheyanne Dean PA-C      ondansetron  4 mg Intravenous Q6H PRN BING Warner      oxyCODONE  2 5 mg Oral Q2H PRN Cheyanne Dean PA-C      pantoprazole  40 mg Oral Early Morning BING Warner      polyethylene glycol  17 g Oral Daily BING Warner      verapamil  180 mg Oral Daily Kristel Cordova MD          Today, Patient Was Seen By: Cheyanne Dean PA-C    **Please Note: This note may have been constructed using a voice recognition system  **

## 2022-03-22 NOTE — ASSESSMENT & PLAN NOTE
· Blood pressure has been elevated, patient was off medications to allow for permissive hypertension  · Blood pressure medications will be discontinued as patient is now CMO

## 2022-03-23 NOTE — ASSESSMENT & PLAN NOTE
· Goals of care discussion had with patient's daughter on 03/22/2022  · Considering she is opting for hospice, and patient has been approved for hospice services, patient's daughter is agreeable to patient being made a comfort measures only  · CM for bed placement  · Patient is now level 4 - CMO  · Pending placement

## 2022-03-23 NOTE — QUICK NOTE
Patient is vaccinated x2 with [facility] requiring patient to receive booster prior to transfer  Patient will be receiving Moderna booster prior to being discharged

## 2022-03-23 NOTE — PLAN OF CARE
Problem: DEATH & DYING  Goal: Pt/Family communicate acceptance of impending death and expresses psychological comfort and peace  Description: INTERVENTIONS:  - Assess patient/family anxiety and grief process related to end of life issues  - Provide emotional, spiritual and psychosocial support  - Provide information about the patients health status with consideration of family and cultural values  - Communicate willingness to discuss death and facilitate grief process  with patient/family as appropriate  - Emphasize sustaining relationships within family system and community, or loco/spiritual traditions  - Initiate Spiritual Care, Pastoral care or other ancillary consults as needed  - Refer to community support groups as appropriate  Outcome: Progressing     Problem: Prexisting or High Potential for Compromised Skin Integrity  Goal: Skin integrity is maintained or improved  Description: INTERVENTIONS:  - Identify patients at risk for skin breakdown  - Assess and monitor skin integrity  - Assess and monitor nutrition and hydration status  - Monitor labs   - Assess for incontinence   - Turn and reposition patient  - Assist with mobility/ambulation  - Relieve pressure over bony prominences  - Avoid friction and shearing  - Provide appropriate hygiene as needed including keeping skin clean and dry  - Evaluate need for skin moisturizer/barrier cream  - Collaborate with interdisciplinary team   - Patient/family teaching  - Consider wound care consult   Outcome: Progressing     Problem: Knowledge Deficit  Goal: Patient/family/caregiver demonstrates understanding of disease process, treatment plan, medications, and discharge instructions  Description: Complete learning assessment and assess knowledge base    Interventions:  - Provide teaching at level of understanding  - Provide teaching via preferred learning methods  Outcome: Progressing     Problem: DISCHARGE PLANNING  Goal: Discharge to home or other facility with appropriate resources  Description: INTERVENTIONS:  - Identify barriers to discharge w/patient and caregiver  - Arrange for needed discharge resources and transportation as appropriate  - Identify discharge learning needs (meds, wound care, etc )  - Arrange for interpretive services to assist at discharge as needed  - Refer to Case Management Department for coordinating discharge planning if the patient needs post-hospital services based on physician/advanced practitioner order or complex needs related to functional status, cognitive ability, or social support system  Outcome: Progressing

## 2022-03-23 NOTE — ASSESSMENT & PLAN NOTE
· MRI shows multiple small acute infarcts less than 1 cm in size  Patient with significant right-sided weakness upper and lower extremity  · Patient with history of AFib, has refused anticoagulation in the past   Patient is on aspirin at home  · Patient's daughter pursuing hospice care  Met with hospice nurse 03/22/2022, and patient was approved for home hospice  Patient's daughter cannot care for her at home  Cm notified, and working on placement  · Patient's daughter agreeable to comfort measures     · Stable for discharge to SNF with hospice

## 2022-03-23 NOTE — CASE MANAGEMENT
Case Management Discharge Planning Note    Patient name Filippo Pressley  Location /-25 MRN [id number]  : 3/29/1921 Date 3/23/2022       Current Admission Date: 3/17/2022  Current Admission Diagnosis:Acute embolic stroke [facility])   Patient Active Problem List    Diagnosis Date Noted    Goals of care, counseling/discussion 2022    ICAO (internal carotid artery occlusion), left     Acute embolic stroke ([facility] [address] )     GERD (gastroesophageal reflux disease) 2022    A-fib ([facility] [address] ) 2022    Hypokalemia 2021    Neuropathy of ankle, left 2021    Persistent proteinuria 2021    Primary osteoarthritis of both knees 2020    Primary osteoarthritis of left knee 2020    Eustachian tube dysfunction, right 2020    Hypertension 2020    Arthritis 2020    Pure hypercholesterolemia 2019    Sensorineural hearing loss (SNHL) of both ears 2019      LOS (days): 6  Geometric Mean LOS (GMLOS) (days): 2 90  Days to GMLOS:-2 8     OBJECTIVE:  Risk of Unplanned Readmission Score: 10     Current admission status: Inpatient   Preferred [facility]acy:   [facility] [address]  [address]  [address]  Phone: [phone] Fax: [phone]    Primary Care Provider: Bailee Gaona MD    Primary Insurance: MEDICARE  Secondary Insurance: [facility]    DISCHARGE DETAILS:  TC to pt daughter Suellen Oneill to review SNF that had accepted for hospice with their rates  Daughter states she missed a TC from [address] [place] [address] and would like to speak to them about their rate  Daughter states she would prefer [facility] and really would not be able to care for her at home  Sent a message to Jean of [employer]  Notified Tonia Oleary from [facility]

## 2022-03-23 NOTE — CASE MANAGEMENT
Case Management Discharge Planning Note    Patient name Sandra Chavez  Location /-31 MRN [id number]  : 3/29/1921 Date 3/23/2022       Current Admission Date: 3/17/2022  Current Admission Diagnosis:Acute embolic stroke [facility])   Patient Active Problem List    Diagnosis Date Noted    Goals of care, counseling/discussion 2022    ICAO (internal carotid artery occlusion), left     Acute embolic stroke ([facility] [address] )     GERD (gastroesophageal reflux disease) 2022    A-fib ([facility] [address] ) 2022    Hypokalemia 2021    Neuropathy of ankle, left 2021    Persistent proteinuria 2021    Primary osteoarthritis of both knees 2020    Primary osteoarthritis of left knee 2020    Eustachian tube dysfunction, right 2020    Hypertension 2020    Arthritis 2020    Pure hypercholesterolemia 2019    Sensorineural hearing loss (SNHL) of both ears 2019      LOS (days): 6  Geometric Mean LOS (GMLOS) (days): 2 90  Days to GMLOS:-2 9     OBJECTIVE:  Risk of Unplanned Readmission Score: 10     Current admission status: Inpatient   Preferred Pharmacy:   [facility] [address]  [address]  [facility] [place] [address]  Phone: [phone] Fax: [phone]    Primary Care Provider: Jimmy Dhillon MD    Primary Insurance: MEDICARE  Secondary Insurance: [address] [place] [address] DETAILS:  Received a TC from [facility] [address] [facility] [address] who states they are offering the bed to the pt  Pt will need the COVID booster vaccine so she would not have to be in isolation  Pt will need a COVID swab  Pt may be admitted to the facility once she has had the swab and booster  TC to pt daughter cell and left a VM of same

## 2022-03-23 NOTE — PROGRESS NOTES
Family provided vaccination records and pt already had third dose for covid 19  Copy made and put in chart

## 2022-03-23 NOTE — PLAN OF CARE
Patient is AxO to self only; disoriented to place, time, & situation  Patient is lethargic/sleepy but is easily arousable  Patient has expressive asphasia & has difficulty communicating  Patient is on RA & VSS  Patient denies pain/discomfort via FLACC scale  Patient sleeps between care  Patient is on [address]  Patient is an assist x2 for care  Q 2 hr turning & repositioning with wedges provided  Oral care & incontinence care was provided  Pleasure feeding was provided  Shafer is clean, dry, & intact  Bed is in lowest position  Alarms audible  All needs are within reach    Problem: INFECTION - ADULT  Goal: Absence or prevention of progression during hospitalization  Description: INTERVENTIONS:  - Assess and monitor for signs and symptoms of infection  - Monitor lab/diagnostic results  - Monitor all insertion sites, i e  indwelling lines, tubes, and drains  - Monitor endotracheal if appropriate and nasal secretions for changes in amount and color  - [place] appropriate cooling/warming therapies per order  - Administer medications as ordered  - Instruct and encourage patient and family to use good hand hygiene technique  - Identify and instruct in appropriate isolation precautions for identified infection/condition  Outcome: Progressing     Problem: SAFETY ADULT  Goal: Patient will remain free of falls  Description: INTERVENTIONS:  - Educate patient/family on patient safety including physical limitations  - Instruct patient to call for assistance with activity   - Consult OT/PT to assist with strengthening/mobility   - Keep Call bell within reach  - Keep bed low and locked with side rails adjusted as appropriate  - Keep care items and personal belongings within reach  - Initiate and maintain comfort rounds  - Make Fall Risk Sign visible to staff  - Offer Toileting every 2 Hours, in advance of need  - Initiate/Maintain fall alarm  - Obtain necessary fall risk management equipment: fall alarm  - Apply yellow socks and bracelet for high fall risk patients  - Consider moving patient to room near nurses station  Outcome: Progressing     Problem: Prexisting or High Potential for Compromised Skin Integrity  Goal: Skin integrity is maintained or improved  Description: INTERVENTIONS:  - Identify patients at risk for skin breakdown  - Assess and monitor skin integrity  - Assess and monitor nutrition and hydration status  - Monitor labs   - Assess for incontinence   - Turn and reposition patient  - Assist with mobility/ambulation  - Relieve pressure over bony prominences  - Avoid friction and shearing  - Provide appropriate hygiene as needed including keeping skin clean and dry  - Evaluate need for skin moisturizer/barrier cream  - Collaborate with interdisciplinary team   - Patient/family teaching  - Consider wound care consult   Outcome: Progressing

## 2022-03-23 NOTE — PROGRESS NOTES
Berta U  66   Progress Note - Cristina Apodaca 3/29/1921, 80 y o  female MRN: [id number]  Unit/Bed#: -01 Encounter: [id number]  Primary Care Provider: Eliseo Menard MD   Date and time admitted to hospital: 3/17/2022  7:19 PM    Goals of care, counseling/discussion  Assessment & Plan  · Goals of care discussion had with patient's daughter on 03/22/2022  · Considering she is opting for hospice, and patient has been approved for hospice services, patient's daughter is agreeable to patient being made a comfort measures only  · CM for bed placement  · Patient is now level 4 - CMO  · Pending placement  * Acute embolic stroke [facility])  Assessment & Plan  · MRI shows multiple small acute infarcts less than 1 cm in size  Patient with significant right-sided weakness upper and lower extremity  · Patient with history of AFib, has refused anticoagulation in the past   Patient is on aspirin at home  · Patient's daughter pursuing hospice care  Met with hospice nurse 03/22/2022, and patient was approved for home hospice  Patient's daughter cannot care for her at home  Cm notified, and working on placement  · Patient's daughter agreeable to comfort measures  · Stable for discharge to SNF with hospice    ICAO (internal carotid artery occlusion), left  Assessment & Plan  · CTA shows occluded left cervical ICA and intracranial ICA  · Neuro discussed with endovascular team and patient is not a candidate for any intervention  · Hospice evaluation- approved for hospice  Cm for coordinating bed placement  · Patient is CMO; therefore, no further intervention  A-fib [facility])  Assessment & Plan  · Will continue aspirin for now  Patient has refused full-dose anticoagulation in the past   Previous provider had spoken to daughter at the bedside, and agree to continue aspirin  · Hospice evaluation requested by daughter  Patient was approved for hospice    · Goals of care discussion had with patient's daughter 2022, and agreeable to [facility]  GERD (gastroesophageal reflux disease)  Assessment & Plan  · CMO    Hypertension  Assessment & Plan  · Blood pressure has been elevated, patient was off medications to allow for permissive hypertension  · Blood pressure medications will be discontinued as patient is now CMO    Pure hypercholesterolemia  Assessment & Plan  · CMO        VTE Pharmacologic Prophylaxis: VTE Score: 9 High Risk (Score >/= 5) - Pharmacological DVT Prophylaxis Ordered: CMO  Sequential Compression Devices Ordered  Patient Centered Rounds: I performed bedside rounds with nursing staff today  Discussions with Specialists or Other Care Team Provider:  Case Management, nursing    Education and Discussions with Family / Patient: Patient's daughter will be in to visit the patient later today        Time Spent for Care: 30 minutes  More than 50% of total time spent on counseling and coordination of care as described above  Current Length of Stay: 6 day(s)  Current Patient Status: Inpatient   Certification Statement: The patient will continue to require additional inpatient hospital stay due to Pending placement with hospice  Discharge Plan: Pending bed availability    Code Status: Level 4 - Comfort Care    Subjective:   Patient seen and examined resting in bed, in no apparent distress  No acute events overnight  Patient is sleeping and very lethargic, unable to stay awake for encounter, and unable to effectively communicate, as she is making only incomprehensible sounds  Objective:     Vitals:   Temp (24hrs), Av 5 °F (36 9 °C), Min:97 8 °F (36 6 °C), Max:99 1 °F (37 3 °C)    Temp:  [97 8 °F (36 6 °C)-99 1 °F (37 3 °C)] 97 9 °F (36 6 °C)  HR:  [105-125] 114  Resp:  [14-20] 18  BP: (133-155)/(76-95) 133/76  SpO2:  [91 %-95 %] 91 %  Body mass index is 28 57 kg/m²  Input and Output Summary (last 24 hours):      Intake/Output Summary (Last 24 hours) at 3/23/2022 1445  Last data filed at 3/23/2022 0900  Gross per 24 hour   Intake 0 ml   Output 800 ml   Net -800 ml       Physical Exam:   Physical Exam  Vitals and nursing note reviewed  Constitutional:       General: She is sleeping  She is not in acute distress  Appearance: She is normal weight  She is ill-appearing  She is not toxic-appearing  HENT:      Head: Normocephalic and atraumatic  Mouth/Throat:      Mouth: Mucous membranes are moist    Eyes:      Conjunctiva/sclera: Conjunctivae normal    Cardiovascular:      Rate and Rhythm: Tachycardia present  Rhythm irregular  Pulses: Normal pulses  Pulmonary:      Effort: Pulmonary effort is normal       Breath sounds: Normal breath sounds  No rhonchi  Musculoskeletal:      Cervical back: Neck supple  Right lower leg: No edema  Left lower leg: No edema  Skin:     General: Skin is warm and dry  Neurological:      Mental Status: She is lethargic and disoriented  Cranial Nerves: Dysarthria present  Psychiatric:         Speech: She is noncommunicative  Additional Data:     Labs:  Results from last 7 days   Lab Units 03/19/22  0453   WBC Thousand/uL 9 93   HEMOGLOBIN g/dL 12 9   HEMATOCRIT % 37 0   PLATELETS Thousands/uL 225     Results from last 7 days   Lab Units 03/19/22  0453   SODIUM mmol/L 134*   POTASSIUM mmol/L 3 5   CHLORIDE mmol/L 99   CO2 mmol/L 29   BUN mg/dL 15   CREATININE mg/dL 0 64   ANION GAP mmol/L 6   CALCIUM mg/dL 8 4   GLUCOSE RANDOM mg/dL 102     Results from last 7 days   Lab Units 03/17/22  1935   INR  1 09     Results from last 7 days   Lab Units 03/17/22  1932   POC GLUCOSE mg/dl 141*     Results from last 7 days   Lab Units 03/18/22  0612   HEMOGLOBIN A1C % 5 5           Lines/Drains:  Invasive Devices  Report    Peripheral Intravenous Line            Peripheral IV 03/20/22 Dorsal (posterior); Left Wrist 2 days          Drain            Urethral Catheter Coude 16 Fr  <1 day              Urinary Catheter:  Goal for removal: Shafer for comfort               Imaging: No pertinent imaging reviewed  Recent Cultures (last 7 days):         Last 24 Hours Medication List:   Current Facility-Administered Medications   Medication Dose Route Frequency Provider Last Rate    acetaminophen  650 mg Oral Q6H PRN LOKESH CastellanosNP      ondansetron  4 mg Intravenous Q6H PRN Carmen Wilson, LOKESHNP      oxyCODONE  2 5 mg Oral Q2H PRN Nay Saravia PA-C          Today, Patient Was Seen By: Nay Saravia PA-C    **Please Note: This note may have been constructed using a voice recognition system  **

## 2022-03-24 NOTE — ASSESSMENT & PLAN NOTE
· Goals of care discussion had with patient's daughter on 03/22/2022  · Considering she is opting for hospice, and patient has been approved for hospice services, patient's daughter is agreeable to patient being made a comfort measures only  · CM for bed placement  · Patient is now level 4 - CMO  · Discharge to skilled nursing facility with hospice services on 03/24/2022

## 2022-03-24 NOTE — ASSESSMENT & PLAN NOTE
· Blood pressure has been elevated, patient was off medications to allow for permissive hypertension  · Blood pressure medications discontinued as patient is now CMO

## 2022-03-24 NOTE — ASSESSMENT & PLAN NOTE
· CTA shows occluded left cervical ICA and intracranial ICA  · Neuro discussed with endovascular team and patient is not a candidate for any intervention  · Hospice evaluation- approved for hospice  · Patient is CMO; therefore, no further intervention  · Discharge to skilled nursing facility with hospice services

## 2022-03-24 NOTE — PHYSICAL THERAPY NOTE
PHYSICAL THERAPY NOTE    Pt's chart reviewed, pt currently level 4- comfort care, and family is agreeable to hospice services upon discharge  PT will discontinue order, please reconsult if necessary      Lexi Gonzales, PT

## 2022-03-24 NOTE — ASSESSMENT & PLAN NOTE
· MRI shows multiple small acute infarcts less than 1 cm in size  Patient with significant right-sided weakness upper and lower extremity  · Patient with history of AFib, has refused anticoagulation in the past   Patient is on aspirin at home  · Patient's daughter pursuing hospice care  Met with hospice nurse 03/22/2022, and patient was approved for home hospice  Patient's daughter cannot care for her at home  Cm notified, and working on placement  · Patient's daughter agreeable to comfort measures  · Stable for discharge to SNF with hospice - discharge to skilled nursing facility with hospice services on 03/24/2022

## 2022-03-24 NOTE — CASE MANAGEMENT
Case Management Discharge Planning Note    Patient name Olivier Dears  Location /-36 MRN [id number]  : 3/29/1921 Date 3/24/2022       Current Admission Date: 3/17/2022  Current Admission Diagnosis:Acute embolic stroke [facility])   Patient Active Problem List    Diagnosis Date Noted    Goals of care, counseling/discussion 2022    ICAO (internal carotid artery occlusion), left     Acute embolic stroke ([facility] [address] )     GERD (gastroesophageal reflux disease) 2022    A-fib ([facility] [address] ) 2022    Hypokalemia 2021    Neuropathy of ankle, left 2021    Persistent proteinuria 2021    Primary osteoarthritis of both knees 2020    Primary osteoarthritis of left knee 2020    Eustachian tube dysfunction, right 2020    Hypertension 2020    Arthritis 2020    Pure hypercholesterolemia 2019    Sensorineural hearing loss (SNHL) of both ears 2019      LOS (days): 7  Geometric Mean LOS (GMLOS) (days): 2 90  Days to GMLOS:-3 6     OBJECTIVE:  Risk of Unplanned Readmission Score: 10     Current admission status: Inpatient   Preferred Pharmacy:   [facility] [address]  [address] [place] [address]  Phone: [phone] Fax: [phone]    Primary Care Provider: Gela Thompson MD    Primary Insurance: MEDICARE  Secondary Insurance: [address] Name, [facility] & State : [facility] and [address], [address] [facility]  Receiving Facility/Agency Phone Number: [phone]  Facility/Agency Fax Number: [phone]   Pt evaluated by [facility] and is stable to be discharged to [address]  Copy of COVID card in discharge paperwork  Spoke to pt daughter Radha Shaikh who was made aware she does not have to go to [address] today for paperwork  Can go tomorrow  Daughter is agreeable to same  Sent for transport via [place]    Pt will be transported at 1200 via "[employer]"  Spoke to [employer] at [place] of same

## 2022-03-24 NOTE — DISCHARGE SUMMARY
Berta U  66   Discharge- [address]  3/29/1921, 80 y o  female MRN: [id number]  Unit/Bed#: -01 Encounter: [id number]  Primary Care Provider: Alan Sheehan MD   Date and time admitted to hospital: 3/17/2022  7:19 PM    Goals of care, counseling/discussion  Assessment & Plan  · Goals of care discussion had with patient's daughter on 03/22/2022  · Considering she is opting for hospice, and patient has been approved for hospice services, patient's daughter is agreeable to patient being made a comfort measures only  · CM for bed placement  · Patient is now level 4 - CMO  · Discharge to skilled nursing facility with hospice services on 03/24/2022  * Acute embolic stroke [facility])  Assessment & Plan  · MRI shows multiple small acute infarcts less than 1 cm in size  Patient with significant right-sided weakness upper and lower extremity  · Patient with history of AFib, has refused anticoagulation in the past   Patient is on aspirin at home  · Patient's daughter pursuing hospice care  Met with hospice nurse 03/22/2022, and patient was approved for home hospice  Patient's daughter cannot care for her at home  Cm notified, and working on placement  · Patient's daughter agreeable to comfort measures  · Stable for discharge to SNF with hospice - discharge to skilled nursing facility with hospice services on 03/24/2022  ICAO (internal carotid artery occlusion), left  Assessment & Plan  · CTA shows occluded left cervical ICA and intracranial ICA  · Neuro discussed with endovascular team and patient is not a candidate for any intervention  · Hospice evaluation- approved for hospice  · Patient is CMO; therefore, no further intervention  · Discharge to skilled nursing facility with hospice services  A-fib [facility])  Assessment & Plan  · Will continue aspirin for now    Patient has refused full-dose anticoagulation in the past   Previous provider had spoken to daughter at the bedside, and agree to continue aspirin  · Hospice evaluation requested by daughter  Patient was approved for hospice  · Goals of care discussion had with patient's daughter 03/22/2022, and agreeable to CMO  GERD (gastroesophageal reflux disease)  Assessment & Plan  · CMO    Hypertension  Assessment & Plan  · Blood pressure has been elevated, patient was off medications to allow for permissive hypertension  · Blood pressure medications discontinued as patient is now CMO    Pure hypercholesterolemia  Assessment & Plan  · CMO      Medical Problems             Resolved Problems  Date Reviewed: 3/24/2022          Resolved    Stage 3a chronic kidney disease ([facility] [address] ) 3/17/2022     Resolved by  Dre Mehta PA-C              Discharging Physician / Practitioner: Mary Herzog PA-C  PCP: Nick Moreno MD  Admission Date:   Admission Orders (From admission, onward)     Ordered        03/17/22 2100  Inpatient Admission  Once                      Discharge Date: 03/24/22    Consultations During Hospital Stay:  · Neurology  · PT/OT  · Hospice  · Case management    Procedures Performed:   · None    Significant Findings / Test Results:   · CT head:  No acute intracranial hemorrhage  Right frontal centrum semiovale age indeterminate ischemia/infarct  Microangiopathic changes  Right maxillary sinus polyp  · CTA head and neck:  Occluded left cervical ICA and intracranial ICA  Reconstitution in the left supraclinoid/ICA terminus  Diminutive/decreased flow left MCA and decreased flow distal branches  · CT chest/abdomen/pelvis:  Cardiomegaly  Cholelithiasis    · MRI brain:  -There are multiple small acute infarcts scattered within the brain parenchyma involving multiple different vascular territories without mass effect or hemorrhagic transformation, all less than 1 cm in size    -Incomplete flow void of the left distal cervical and intracranial internal carotid artery consistent with the occlusion seen on CT angiography    -Moderate chronic microangiopathic change within the brain parenchyma  · CT head:  Small scattered infarcts are better appreciated on yesterday's MRI  No new territorial distribution infarct  No acute hemorrhage or mass effect  Moderate chronic microangiopathy  Incidental Findings:   · See above     Test Results Pending at Discharge (will require follow up): · None     Outpatient Tests Requested:  · None    Complications:  None    Reason for Admission:  Acute embolic stroke    Hospital Course:   Rafael Albert is a 80 y o  female patient with a past medical history significant for hypertension, hyperlipidemia, GERD who originally presented to the hospital on 3/17/2022 due to altered mental status  Please refer to the initial history and physical as outlined by Malachi Figueroa PA-C on 03/17/2022 for additional presenting features  In brief, patient was found to have an acute embolic stroke, as evidenced on imaging of the brain  Patient was initially admitted to the critical care service under the stroke pathway; and was subsequently downgraded to the medical service for further management  Patient was seen by Neurology and deemed not a candidate for tPA given chronicity of the symptoms, and not a candidate for endovascular intervention given the reconstitution of blood vessels intracranially  Patient's daughter ultimately opted for a hospice evaluation and agreed to patient being made comfort measures only status  Patient was approved for hospice services, and was discharged to a skilled nursing facility on 03/24/2022 under hospice care  This is a brief discharge summary; please refer to the above assessment/plan as well as the remainder of the patient's medical history for further details  Please see above list of diagnoses and related plan for additional information       Condition at Discharge: terminal    Discharge Day Visit / Exam:   Subjective:  Patient seen and examined resting in bed, in no apparent distress  Patient does not appear to be in any pain or discomfort at this time  However, she is lethargic and unable to communicate effectively  Vitals: Blood Pressure: (!) 164/102 (03/23/22 2216)  Pulse: (!) 168 (03/23/22 2216)  Temperature: 100 3 °F (37 9 °C) (03/23/22 2216)  Temp Source: Axillary (03/22/22 1533)  Respirations: (!) 30 (03/23/22 2216)  Height: 4' 8" (142 2 cm) (03/18/22 0820)  Weight - Scale: 57 8 kg (127 lb 6 8 oz) (w/out SCD pump) (03/23/22 0600)  SpO2: 94 % (03/23/22 2216)  Exam:   Physical Exam  Constitutional:       General: She is sleeping  Appearance: She is ill-appearing  HENT:      Head: Normocephalic and atraumatic  Mouth/Throat:      Mouth: Mucous membranes are moist    Cardiovascular:      Rate and Rhythm: Tachycardia present  Rhythm irregular  Pulmonary:      [place]: Pulmonary effort is normal  No respiratory distress  Abdominal:      General: Abdomen is flat  Palpations: Abdomen is soft  Musculoskeletal:      Right lower leg: No edema  Left lower leg: No edema  Skin:     General: Skin is warm and dry  Neurological:      Mental Status: She is lethargic  Cranial Nerves: Dysarthria present  Discussion with Family: Attempted to update  (daughter) via phone  Left voicemail  Discharge instructions/Information to patient and family:   See after visit summary for information provided to patient and family  Provisions for Follow-Up Care:  See after visit summary for information related to follow-up care and any pertinent home health orders  Disposition:   [facility] Readmission:  None     Discharge Statement:  I spent 60 minutes discharging the patient  This time was spent on the day of discharge  I had direct contact with the patient on the day of discharge   Greater than 50% of the total time was spent examining patient, answering all patient questions, arranging and discussing plan of care with patient as well as directly providing post-discharge instructions  Additional time then spent on discharge activities  Discharge Medications:  See after visit summary for reconciled discharge medications provided to patient and/or family        **Please Note: This note may have been constructed using a voice recognition system**

## 2022-03-24 NOTE — PLAN OF CARE
Patient is disoriented x4  Patient is lethargic but arouses to tactile stimulation  Patient has expressive asphasia & has difficulty communicating  Patient is on RA & VSS  Patient denies pain/discomfort via FLACC scale  Patient sleeps between care  Patient is on [address]  Patient is an assist x2 for care  Q 2 hr turning & repositioning with wedges provided  Oral care & incontinence care were provided  Shafer is clean, dry, & intact  Bed is in lowest position  Alarms audible  All needs are within reach    Problem: INFECTION - ADULT  Goal: Absence or prevention of progression during hospitalization  Description: INTERVENTIONS:  - Assess and monitor for signs and symptoms of infection  - Monitor lab/diagnostic results  - Monitor all insertion sites, i e  indwelling lines, tubes, and drains  - Monitor endotracheal if appropriate and nasal secretions for changes in amount and color  - [place] appropriate cooling/warming therapies per order  - Administer medications as ordered  - Instruct and encourage patient and family to use good hand hygiene technique  - Identify and instruct in appropriate isolation precautions for identified infection/condition  Outcome: Progressing     Problem: SAFETY ADULT  Goal: Patient will remain free of falls  Description: INTERVENTIONS:  - Educate patient/family on patient safety including physical limitations  - Instruct patient to call for assistance with activity   - Consult OT/PT to assist with strengthening/mobility   - Keep Call bell within reach  - Keep bed low and locked with side rails adjusted as appropriate  - Keep care items and personal belongings within reach  - Initiate and maintain comfort rounds  - Make Fall Risk Sign visible to staff  - Offer Toileting every 2 Hours, in advance of need  - Initiate/Maintain fall alarm  - Obtain necessary fall risk management equipment: fall alarm  - Apply yellow socks and bracelet for high fall risk patients  - Consider moving patient to room near nurses station  Outcome: Progressing     Problem: Knowledge Deficit  Goal: Patient/family/caregiver demonstrates understanding of disease process, treatment plan, medications, and discharge instructions  Description: Complete learning assessment and assess knowledge base    Interventions:  - Provide teaching at level of understanding  - Provide teaching via preferred learning methods  Outcome: Progressing     Problem: Prexisting or High Potential for Compromised Skin Integrity  Goal: Skin integrity is maintained or improved  Description: INTERVENTIONS:  - Identify patients at risk for skin breakdown  - Assess and monitor skin integrity  - Assess and monitor nutrition and hydration status  - Monitor labs   - Assess for incontinence   - Turn and reposition patient  - Assist with mobility/ambulation  - Relieve pressure over bony prominences  - Avoid friction and shearing  - Provide appropriate hygiene as needed including keeping skin clean and dry  - Evaluate need for skin moisturizer/barrier cream  - Collaborate with interdisciplinary team   - Patient/family teaching  - Consider wound care consult   Outcome: Progressing     Problem: COPING  Goal: Pt/Family able to verbalize concerns and demonstrate effective coping strategies  Description: INTERVENTIONS:  - Assist patient/family to identify coping skills, available support systems and cultural and spiritual values  - Provide emotional support, including active listening and acknowledgement of concerns of patient and caregivers  - Reduce environmental stimuli, as able  - Provide patient education  - Assess for spiritual pain/suffering and initiate spiritual care, including notification of Pastoral Care or loco based community as needed  - Assess effectiveness of coping strategies  Outcome: Progressing     Problem: DEATH & DYING  Goal: Pt/Family communicate acceptance of impending death and expresses psychological comfort and peace  Description: INTERVENTIONS:  - Assess patient/family anxiety and grief process related to end of life issues  - Provide emotional, spiritual and psychosocial support  - Provide information about the patients health status with consideration of family and cultural values  - Communicate willingness to discuss death and facilitate grief process  with patient/family as appropriate  - Emphasize sustaining relationships within family system and community, or loco/spiritual traditions  - Initiate Spiritual Care, Pastoral care or other ancillary consults as needed  - Refer to community support groups as appropriate  Outcome: Progressing

## 2023-09-15 NOTE — H&P
----- Message from Ebnoi Medina sent at 9/15/2023  8:32 AM CDT -----  Regarding: Poison ivy.. again!  Contact: [phone]  Oh man.  I got Into poison ivy again.  I have been treating it with calamine lotion and otc creams but it isn't clearing up.  can you refill my steroid cream you prescribed me last time this happened?  I am miserable and have a wedding to go to this weekend!  a dress with knee socks covering my blisters would look like a hot mess!     [facility] in [place] for my rx.    thank you!    Eboni    SILVANA Khalil 67 3/29/1921, 80 y o  female MRN: [id number]  Unit/Bed#: ICU 06-01 Encounter: [id number]  Primary Care Provider: Eliseo Menard MD   Date and time admitted to hospital: 3/17/2022  7:19 PM    * ICAO (internal carotid artery occlusion), left  Assessment & Plan  · Patient developed speech difficulties morning of 3/17 with multiple subsequent falls prompting medical evaluation  · Stroke alert in ED  · 3/17 CTH: No acute intracranial hemorrhage  Right frontal centrum semiovale age-indeterminate ischemia/infarct  Microangiopathic changes  · 3/17 CTA H/N: Occluded left cervical ICA and intracranial ICA  Reconstitution in the left supraclinoid /ICA terminus  Diminutive/decreased flow left MCA and decreased flow distal branches  · Neurology evaluated - not a candidate for tPA given chronicity of symptoms; not an candidate for endovascular intervention given re-constitution of blood vessels intra-cranially   · Admit to ICU  · Initiate stroke pathway  · q1h neuro checks   · PT/OT/Speech/PMR  · Repeat CTH in AM  · MRI brain  · Echocardiogram  · Lipid panel, HbA1c  · Goal -220    Hypertension  Assessment & Plan  · Outpatient regimen of verapamil 180 CR tablet   · Hold for permissive hypertension    A-fib (HCC)  Assessment & Plan  · No known history   · Chads2-vasc score of 4  · Continue just aspirin for now  · Hold full AC in the setting of multiple falls and age    GERD (gastroesophageal reflux disease)  Assessment & Plan  · Takes omeprazole 20 mg daily as an outpatient  · Protonix 40 mg daily    Pure hypercholesterolemia  Assessment & Plan  · Takes pravastatin as an outpatient  · Start atorvastatin 40 mg PO daily    Discussed with patient and daughter at bedside - does not want intubation or chest compressions  Is agreeable to central line placement if necessary to maintain goal SBP  -------------------------------------------------------------------------------------------------------------  Chief Complaint: Acute CVA    History of Present Illness   HX and PE limited by: dysarthria  Raafel Albert is a 80 y o  female with PMH significant for HTN, HLD, and GERD who presents to the ED with altered mental status  Patient had right sided tristan-paresis and dysarthria prompting a stroke alert  She was also trauma scanned given history of multiple falls today  Imaging revealed acute left ICA occlusion  Per the patient's daughter, symptoms began in the late morning with increased somnolence, confusion and multiple subsequent falls  She was evaluated by Neurology who deemed her not to be a tPA candidate givne time since symptoms onset and was also felt to not be a candidate for endovascular therapy  Critical care contacted for admission for close neurological and hemodynamic monitoring  History obtained from child, chart review and the patient   -------------------------------------------------------------------------------------------------------------  Dispo: Admit to Critical Care     Code Status: Level 3 - DNAR and DNI  --------------------------------------------------------------------------------------------------------------  Review of Systems   Eyes: Positive for visual disturbance  Respiratory: Negative for shortness of breath  Cardiovascular: Negative for chest pain  Gastrointestinal: Positive for abdominal pain, constipation and nausea  Genitourinary: Negative for difficulty urinating  Musculoskeletal: Positive for gait problem  Neurological: Negative for headaches  A 12-point, complete review of systems was reviewed and negative except as stated above     Physical Exam  Constitutional:       General: She is not in acute distress  HENT:      Head: Atraumatic        Mouth/Throat:      Mouth: Mucous membranes are moist    Cardiovascular:      Rate and Rhythm: Tachycardia present  Rhythm irregularly irregular  Pulses:           Radial pulses are 1+ on the right side and 1+ on the left side  Dorsalis pedis pulses are 1+ on the right side and 1+ on the left side  Pulmonary:      Effort: No respiratory distress  Breath sounds: No wheezing, rhonchi or rales  Abdominal:      General: Bowel sounds are decreased  There is distension  Palpations: Abdomen is soft  Tenderness: There is no abdominal tenderness  Musculoskeletal:      Cervical back: Neck supple  Right lower le+ Pitting Edema present  Left lower le+ Pitting Edema present  Skin:     General: Skin is warm and dry  Capillary Refill: Capillary refill takes less than 2 seconds  Neurological:      Cranial Nerves: Dysarthria present  No facial asymmetry  Sensory: Sensation is intact  Motor: Pronator drift (right) present  Comments: 4/5 strength RUE/RLE  5/5 strength LUE/LLE  Does not track finger  Left pupil 4mm, unreactive   Right pupil 2-3mm, sluggish   With prompting able to state she is in the hospital and it is  but mostly yes/no answering to questions        --------------------------------------------------------------------------------------------------------------  Vitals:   Vitals:    228 22 21422 2200   BP:    (!) 196/110   BP Location:       Pulse:   97 87   Resp:   20 21   Temp:   97 7 °F (36 5 °C)    TempSrc:   Axillary    SpO2: 96%  92% 90%   Weight:  59 kg (130 lb 1 1 oz)     Height:  4' 6" (1 372 m)       Temp  Min: 97 6 °F (36 4 °C)  Max: 97 7 °F (36 5 °C)     Height: 4' 6" (137 2 cm)  Body mass index is 31 36 kg/m²      Laboratory and Diagnostics:  Results from last 7 days   Lab Units 22  1935   WBC Thousand/uL 12 40*   HEMOGLOBIN g/dL 13 7   HEMATOCRIT % 39 7   PLATELETS Thousands/uL 262     Results from last 7 days   Lab Units 22  1935   SODIUM mmol/L 134*   POTASSIUM mmol/L 3 6   CHLORIDE mmol/L 97   CO2 mmol/L 27   ANION GAP mmol/L 10   BUN mg/dL 20   CREATININE mg/dL 0 68   CALCIUM mg/dL 9 1   GLUCOSE RANDOM mg/dL 141*          Results from last 7 days   Lab Units 03/17/22  1935   INR  1 09   PTT seconds 29              ABG:    VBG:          Micro:        EKG: Atrial fibrillation  Imaging: I have personally reviewed pertinent films in PACS      Historical Information   Past Medical History:   Diagnosis Date    Benign essential hypertension     Cancer ([facility] [address] )     rectal and sigmoid colon     Carcinoma in situ     Carotid artery occlusion     Disorder of rectum and anus     Disorder of skin     Diverticular disease of colon     Edema     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)     Essential hypertension     Gastroesophageal reflux disease     Herpes zoster without complication     Hyperlipidemia     Hypokalemia     Idiopathic peripheral neuropathy     Iron deficiency anemia     Localized, primary osteoarthritis     Lower leg    Malaise and fatigue     Mixed hyperlipidemia     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     Post-herpetic polyneuropathy     Unspecified osteoarthritis, unspecified site     Vitamin D deficiency      Past Surgical History:   Procedure Laterality Date    COLON SURGERY      Colon resection with open surgery     COLONOSCOPY  2013    Positive for tumor     HYSTERECTOMY      OTHER SURGICAL HISTORY      fibrous tumor    RECTAL SURGERY  09/2013    tumor     Social History   Social History     Substance and Sexual Activity   Alcohol Use Not Currently    Comment: Denies alcohol use - As per [facility]      Social History     Substance and Sexual Activity   Drug Use Never    Comment: Denies drug use - As per [facility]      Social History     Tobacco Use   Smoking Status Never Smoker   Smokeless Tobacco Never Used     Exercise History: Ambulatory  Family History:   Family History   Problem Relation Age of Onset    Hypertension Father     Heart failure Father      I have reviewed this patient's family history and commented on sigificant items within the HPI      Medications:  Current Facility-Administered Medications   Medication Dose Route Frequency    acetaminophen (TYLENOL) tablet 650 mg  650 mg Oral Q6H PRN    [START ON 3/18/2022] aspirin chewable tablet 81 mg  81 mg Oral Daily    atorvastatin (LIPITOR) tablet 40 mg  40 mg Oral QPM    [START ON 3/18/2022] heparin (porcine) subcutaneous injection 5,000 Units  5,000 Units Subcutaneous Q8H [facility]    multi-electrolyte (PLASMALYTE-A/ISOLYTE-S PH 7 4) IV solution  50 mL/hr Intravenous Continuous    ondansetron (ZOFRAN) injection 4 mg  4 mg Intravenous Q6H PRN    [START ON 3/18/2022] pantoprazole (PROTONIX) EC tablet 40 mg  40 mg Oral Early Morning    [START ON 3/18/2022] polyethylene glycol (MIRALAX) packet 17 g  17 g Oral Daily     Home medications:  Prior to Admission Medications   Prescriptions Last Dose Informant Patient Reported? Taking? ALPRAZolam (XANAX) 0 25 mg tablet 3/16/2022 at Unknown time  No Yes   Sig: Take 1 tablet (0 25 mg total) by mouth daily at bedtime as needed for anxiety   Diclofenac Sodium (VOLTAREN) 1 % Unknown at Unknown time  No No   Sig: Apply 2 g topically 4 (four) times a day   Patient not taking: Reported on 2022    Polyethylene Glycol 3350 (MIRALAX PO) 3/17/2022 at Unknown time Care Giver Yes Yes   Sig: Take by mouth   aspirin 81 mg chewable tablet 3/17/2022 at Unknown time Care Giver Yes Yes   Sig: Chew 81 mg daily   fexofenadine (Allegra Allergy) 180 MG tablet 3/16/2022 at Unknown time Self Yes Yes   Sig: Take 180 mg by mouth daily   fluticasone (FLONASE) 50 mcg/act nasal spray   No No   Si sprays into each nostril daily   naloxone (NARCAN) 4 mg/0 1 mL nasal spray Unknown at Unknown time Self No No   Sig: Administer 1 spray into a nostril   If breathing does not return to normal or if breathing difficulty resumes after 2-3 minutes, give another dose in the other nostril using a new spray  omeprazole (PriLOSEC) 20 mg delayed release capsule Past Week at Unknown time Self No Yes   Sig: Take 1 capsule (20 mg total) by mouth daily   oxyCODONE-acetaminophen (PERCOCET) 5-325 mg per tablet 3/16/2022 at Unknown time  No Yes   Sig: Take 1 tablet by mouth 2 (two) times a day as needed for moderate pain Max Daily Amount: 2 tablets   potassium chloride (K-DUR,KLOR-CON) 10 mEq tablet 3/17/2022 at Unknown time  No Yes   Sig: Take 1 tablet (10 mEq total) by mouth daily   pravastatin (PRAVACHOL) 20 mg tablet 3/17/2022 at Unknown time  No Yes   Sig: Take 1 tablet (20 mg total) by mouth daily   torsemide (DEMADEX) 20 mg tablet Unknown at Unknown time  No No   Sig: Take 1 tablet (20 mg total) by mouth daily   verapamil (CALAN-SR) 180 mg CR tablet 3/17/2022 at Unknown time  No Yes   Sig: Take 1 tablet (180 mg total) by mouth daily at bedtime      Facility-Administered Medications Last Administration Doses Remaining   bupivacaine (MARCAINE) 0 25 % injection 4 mL 10/15/2021 11:39 AM    methylPREDNISolone acetate (DEPO-MEDROL) injection 2 mL 10/15/2021 11:39 AM         Allergies: Allergies   Allergen Reactions    Cephalosporins Other (See Comments)     Per pharmacy       ------------------------------------------------------------------------------------------------------------  Advance Directive and Living Will:      Power of :    POLST:    ------------------------------------------------------------------------------------------------------------  Anticipated Length of Stay is > 2 midnights    Care Time Delivered:   Upon my evaluation, this patient had a high probability of imminent or life-threatening deterioration due to acute left ICA occlusion, which required my direct attention, intervention, and personal management    I have personally provided 36 minutes (2052 to 2212) of critical care time, exclusive of procedures, teaching, family meetings, and any prior time recorded by providers other than myself  Kendall Ronquillo PA-C        Portions of the record may have been created with voice recognition software  Occasional wrong word or "sound a like" substitutions may have occurred due to the inherent limitations of voice recognition software    Read the chart carefully and recognize, using context, where substitutions have occurred

## 2024-06-19 NOTE — PATIENT INSTRUCTIONS
There is a possible fracture in the right wrist   Will place in a brace  Peep brace on  Rest   Ice every 3-4 hours for 20 minutes  Tylenol as needed for pain  Ace wrap also applied to left knee  Follow-up with Orthopedics  You state you will call and schedule follow-up as you already seeing Dr Dwayne Don  Go to ER with any worsening symptoms, increased pain, increased swelling, numbness or tingling or pain into the lower leg    The the
show